# Patient Record
Sex: FEMALE | Race: WHITE | NOT HISPANIC OR LATINO | Employment: OTHER | URBAN - METROPOLITAN AREA
[De-identification: names, ages, dates, MRNs, and addresses within clinical notes are randomized per-mention and may not be internally consistent; named-entity substitution may affect disease eponyms.]

---

## 2017-01-12 ENCOUNTER — OFFICE VISIT - RIVER FALLS (OUTPATIENT)
Dept: FAMILY MEDICINE | Facility: CLINIC | Age: 67
End: 2017-01-12

## 2017-10-13 ENCOUNTER — AMBULATORY - RIVER FALLS (OUTPATIENT)
Dept: FAMILY MEDICINE | Facility: CLINIC | Age: 67
End: 2017-10-13

## 2017-10-14 LAB
CHOLEST SERPL-MCNC: 216 MG/DL
CHOLEST/HDLC SERPL: 3.3 {RATIO}
CREAT SERPL-MCNC: 0.71 MG/DL (ref 0.5–0.99)
GLUCOSE BLD-MCNC: 86 MG/DL (ref 65–99)
HDLC SERPL-MCNC: 66 MG/DL
LDLC SERPL CALC-MCNC: 134 MG/DL
NONHDLC SERPL-MCNC: 150 MG/DL
TRIGL SERPL-MCNC: 68 MG/DL

## 2017-11-09 ENCOUNTER — OFFICE VISIT - RIVER FALLS (OUTPATIENT)
Dept: FAMILY MEDICINE | Facility: CLINIC | Age: 67
End: 2017-11-09

## 2017-11-09 ASSESSMENT — MIFFLIN-ST. JEOR: SCORE: 1191.26

## 2017-12-08 ENCOUNTER — OFFICE VISIT - RIVER FALLS (OUTPATIENT)
Dept: FAMILY MEDICINE | Facility: CLINIC | Age: 67
End: 2017-12-08

## 2017-12-08 ASSESSMENT — MIFFLIN-ST. JEOR: SCORE: 1188.54

## 2017-12-18 ENCOUNTER — OFFICE VISIT - RIVER FALLS (OUTPATIENT)
Dept: FAMILY MEDICINE | Facility: CLINIC | Age: 67
End: 2017-12-18

## 2018-11-28 ENCOUNTER — OFFICE VISIT - RIVER FALLS (OUTPATIENT)
Dept: FAMILY MEDICINE | Facility: CLINIC | Age: 68
End: 2018-11-28

## 2018-12-03 ENCOUNTER — AMBULATORY - RIVER FALLS (OUTPATIENT)
Dept: FAMILY MEDICINE | Facility: CLINIC | Age: 68
End: 2018-12-03

## 2018-12-04 LAB
CHOLEST SERPL-MCNC: 240 MG/DL
CHOLEST/HDLC SERPL: 3.5 {RATIO}
CREAT SERPL-MCNC: 0.74 MG/DL (ref 0.6–0.93)
GLUCOSE BLD-MCNC: 90 MG/DL (ref 65–99)
HDLC SERPL-MCNC: 69 MG/DL
LDLC SERPL CALC-MCNC: 153 MG/DL
NONHDLC SERPL-MCNC: 171 MG/DL
TRIGL SERPL-MCNC: 78 MG/DL

## 2019-01-09 ENCOUNTER — OFFICE VISIT - RIVER FALLS (OUTPATIENT)
Dept: FAMILY MEDICINE | Facility: CLINIC | Age: 69
End: 2019-01-09

## 2019-01-09 ASSESSMENT — MIFFLIN-ST. JEOR: SCORE: 1179.47

## 2019-01-28 ENCOUNTER — OFFICE VISIT - RIVER FALLS (OUTPATIENT)
Dept: FAMILY MEDICINE | Facility: CLINIC | Age: 69
End: 2019-01-28

## 2019-09-27 ENCOUNTER — OFFICE VISIT - RIVER FALLS (OUTPATIENT)
Dept: FAMILY MEDICINE | Facility: CLINIC | Age: 69
End: 2019-09-27

## 2019-09-27 ASSESSMENT — MIFFLIN-ST. JEOR: SCORE: 1145.9

## 2019-10-31 ENCOUNTER — AMBULATORY - RIVER FALLS (OUTPATIENT)
Dept: FAMILY MEDICINE | Facility: CLINIC | Age: 69
End: 2019-10-31

## 2019-11-01 ENCOUNTER — COMMUNICATION - RIVER FALLS (OUTPATIENT)
Dept: FAMILY MEDICINE | Facility: CLINIC | Age: 69
End: 2019-11-01

## 2019-11-01 LAB
A/G RATIO - HISTORICAL: 1.9 (ref 1–2.5)
ALBUMIN SERPL-MCNC: 4.3 GM/DL (ref 3.6–5.1)
ALP SERPL-CCNC: 68 UNIT/L (ref 33–130)
ALT SERPL W P-5'-P-CCNC: 16 UNIT/L (ref 6–29)
AST SERPL W P-5'-P-CCNC: 20 UNIT/L (ref 10–35)
BILIRUB SERPL-MCNC: 0.4 MG/DL (ref 0.2–1.2)
BUN SERPL-MCNC: 5 MG/DL (ref 7–25)
BUN/CREAT RATIO - HISTORICAL: 7 (ref 6–22)
CALCIUM SERPL-MCNC: 9.9 MG/DL (ref 8.6–10.4)
CHLORIDE BLD-SCNC: 106 MMOL/L (ref 98–110)
CHOLEST SERPL-MCNC: 180 MG/DL
CHOLEST/HDLC SERPL: 2.3 {RATIO}
CO2 SERPL-SCNC: 31 MMOL/L (ref 20–32)
CREAT SERPL-MCNC: 0.72 MG/DL (ref 0.6–0.93)
EGFRCR SERPLBLD CKD-EPI 2021: 84 ML/MIN/1.73M2
ERYTHROCYTE [DISTWIDTH] IN BLOOD BY AUTOMATED COUNT: 12.2 % (ref 11–15)
GLOBULIN: 2.3 (ref 1.9–3.7)
GLUCOSE BLD-MCNC: 93 MG/DL (ref 65–99)
HCT VFR BLD AUTO: 40 % (ref 35–45)
HDLC SERPL-MCNC: 80 MG/DL
HGB BLD-MCNC: 13.5 GM/DL (ref 11.7–15.5)
LDLC SERPL CALC-MCNC: 84 MG/DL
MCH RBC QN AUTO: 30.8 PG (ref 27–33)
MCHC RBC AUTO-ENTMCNC: 33.8 GM/DL (ref 32–36)
MCV RBC AUTO: 91.3 FL (ref 80–100)
NONHDLC SERPL-MCNC: 100 MG/DL
PLATELET # BLD AUTO: 323 10*3/UL (ref 140–400)
PMV BLD: 10.4 FL (ref 7.5–12.5)
POTASSIUM BLD-SCNC: 4.7 MMOL/L (ref 3.5–5.3)
PROT SERPL-MCNC: 6.6 GM/DL (ref 6.1–8.1)
RBC # BLD AUTO: 4.38 10*6/UL (ref 3.8–5.1)
SODIUM SERPL-SCNC: 141 MMOL/L (ref 135–146)
TRIGL SERPL-MCNC: 70 MG/DL
WBC # BLD AUTO: 5.4 10*3/UL (ref 3.8–10.8)

## 2020-02-26 ENCOUNTER — OFFICE VISIT - RIVER FALLS (OUTPATIENT)
Dept: FAMILY MEDICINE | Facility: CLINIC | Age: 70
End: 2020-02-26

## 2020-02-26 ASSESSMENT — MIFFLIN-ST. JEOR: SCORE: 1174.93

## 2020-12-11 ENCOUNTER — OFFICE VISIT - RIVER FALLS (OUTPATIENT)
Dept: FAMILY MEDICINE | Facility: CLINIC | Age: 70
End: 2020-12-11

## 2021-03-01 ENCOUNTER — TRANSFERRED RECORDS (OUTPATIENT)
Dept: MULTI SPECIALTY CLINIC | Facility: CLINIC | Age: 71
End: 2021-03-01
Payer: MEDICARE

## 2021-03-01 ENCOUNTER — OFFICE VISIT - RIVER FALLS (OUTPATIENT)
Dept: FAMILY MEDICINE | Facility: CLINIC | Age: 71
End: 2021-03-01
Payer: MEDICARE

## 2021-03-01 ASSESSMENT — MIFFLIN-ST. JEOR: SCORE: 1174.93

## 2021-03-02 ENCOUNTER — COMMUNICATION - RIVER FALLS (OUTPATIENT)
Dept: FAMILY MEDICINE | Facility: CLINIC | Age: 71
End: 2021-03-02
Payer: MEDICARE

## 2021-03-02 LAB
A/G RATIO - HISTORICAL: 1.8 (ref 1–2.5)
ALBUMIN SERPL-MCNC: 4.2 GM/DL (ref 3.6–5.1)
ALP SERPL-CCNC: 64 UNIT/L (ref 37–153)
ALT SERPL W P-5'-P-CCNC: 19 UNIT/L (ref 6–29)
AST SERPL W P-5'-P-CCNC: 20 UNIT/L (ref 10–35)
BASOPHILS # BLD MANUAL: 50 10*3/UL (ref 0–200)
BASOPHILS NFR BLD MANUAL: 1.1 %
BILIRUB SERPL-MCNC: 0.4 MG/DL (ref 0.2–1.2)
BUN SERPL-MCNC: 5 MG/DL (ref 7–25)
BUN/CREAT RATIO - HISTORICAL: 7 (ref 6–22)
CALCIUM SERPL-MCNC: 9.5 MG/DL (ref 8.6–10.4)
CHLORIDE BLD-SCNC: 104 MMOL/L (ref 98–110)
CHOLEST SERPL-MCNC: 147 MG/DL
CHOLEST/HDLC SERPL: 2.2 {RATIO}
CO2 SERPL-SCNC: 29 MMOL/L (ref 20–32)
CREAT SERPL-MCNC: 0.72 MG/DL (ref 0.6–0.93)
EGFRCR SERPLBLD CKD-EPI 2021: 84 ML/MIN/1.73M2
EOSINOPHIL # BLD MANUAL: 248 10*3/UL (ref 15–500)
EOSINOPHIL NFR BLD MANUAL: 5.5 %
ERYTHROCYTE [DISTWIDTH] IN BLOOD BY AUTOMATED COUNT: 12.5 % (ref 11–15)
GLOBULIN: 2.3 (ref 1.9–3.7)
GLUCOSE BLD-MCNC: 86 MG/DL (ref 65–99)
HCT VFR BLD AUTO: 38.2 % (ref 35–45)
HDLC SERPL-MCNC: 68 MG/DL
HGB BLD-MCNC: 12.7 GM/DL (ref 11.7–15.5)
LDLC SERPL CALC-MCNC: 58 MG/DL
LYMPHOCYTES # BLD MANUAL: 1517 10*3/UL (ref 850–3900)
LYMPHOCYTES NFR BLD MANUAL: 33.7 %
MCH RBC QN AUTO: 30.5 PG (ref 27–33)
MCHC RBC AUTO-ENTMCNC: 33.2 GM/DL (ref 32–36)
MCV RBC AUTO: 91.8 FL (ref 80–100)
MONOCYTES # BLD MANUAL: 486 10*3/UL (ref 200–950)
MONOCYTES NFR BLD MANUAL: 10.8 %
NEUTROPHILS # BLD MANUAL: 2201 10*3/UL (ref 1500–7800)
NEUTROPHILS NFR BLD MANUAL: 48.9 %
NONHDLC SERPL-MCNC: 79 MG/DL
PLATELET # BLD AUTO: 323 10*3/UL (ref 140–400)
PMV BLD: 10.6 FL (ref 7.5–12.5)
POTASSIUM BLD-SCNC: 4 MMOL/L (ref 3.5–5.3)
PROT SERPL-MCNC: 6.5 GM/DL (ref 6.1–8.1)
RBC # BLD AUTO: 4.16 10*6/UL (ref 3.8–5.1)
SODIUM SERPL-SCNC: 141 MMOL/L (ref 135–146)
TRIGL SERPL-MCNC: 131 MG/DL
WBC # BLD AUTO: 4.5 10*3/UL (ref 3.8–10.8)

## 2021-10-18 ENCOUNTER — OFFICE VISIT - RIVER FALLS (OUTPATIENT)
Dept: FAMILY MEDICINE | Facility: CLINIC | Age: 71
End: 2021-10-18
Payer: MEDICARE

## 2021-10-18 ENCOUNTER — LAB REQUISITION (OUTPATIENT)
Dept: LAB | Facility: CLINIC | Age: 71
End: 2021-10-18
Payer: COMMERCIAL

## 2021-10-18 DIAGNOSIS — U07.1 COVID-19: ICD-10-CM

## 2021-10-18 PROCEDURE — U0003 INFECTIOUS AGENT DETECTION BY NUCLEIC ACID (DNA OR RNA); SEVERE ACUTE RESPIRATORY SYNDROME CORONAVIRUS 2 (SARS-COV-2) (CORONAVIRUS DISEASE [COVID-19]), AMPLIFIED PROBE TECHNIQUE, MAKING USE OF HIGH THROUGHPUT TECHNOLOGIES AS DESCRIBED BY CMS-2020-01-R: HCPCS | Mod: ORL | Performed by: PHYSICIAN ASSISTANT

## 2021-10-18 ASSESSMENT — MIFFLIN-ST. JEOR: SCORE: 1129.57

## 2021-10-20 LAB — SARS-COV-2 RNA RESP QL NAA+PROBE: NOT DETECTED

## 2021-10-21 LAB — SARS-COV-2 RNA RESP QL NAA+PROBE: NEGATIVE

## 2022-02-12 VITALS
HEIGHT: 62 IN | DIASTOLIC BLOOD PRESSURE: 80 MMHG | HEART RATE: 82 BPM | TEMPERATURE: 97.5 F | SYSTOLIC BLOOD PRESSURE: 130 MMHG | BODY MASS INDEX: 29.74 KG/M2 | WEIGHT: 161.6 LBS

## 2022-02-12 VITALS
SYSTOLIC BLOOD PRESSURE: 118 MMHG | DIASTOLIC BLOOD PRESSURE: 66 MMHG | DIASTOLIC BLOOD PRESSURE: 76 MMHG | SYSTOLIC BLOOD PRESSURE: 128 MMHG | WEIGHT: 161 LBS | HEART RATE: 96 BPM | HEART RATE: 68 BPM | OXYGEN SATURATION: 97 % | TEMPERATURE: 98.1 F | HEIGHT: 62 IN | BODY MASS INDEX: 29.63 KG/M2

## 2022-02-12 VITALS
HEART RATE: 66 BPM | DIASTOLIC BLOOD PRESSURE: 82 MMHG | TEMPERATURE: 97 F | SYSTOLIC BLOOD PRESSURE: 132 MMHG | WEIGHT: 148 LBS | BODY MASS INDEX: 27.23 KG/M2 | HEIGHT: 62 IN

## 2022-02-12 VITALS
DIASTOLIC BLOOD PRESSURE: 60 MMHG | SYSTOLIC BLOOD PRESSURE: 146 MMHG | WEIGHT: 158 LBS | BODY MASS INDEX: 29.08 KG/M2 | HEART RATE: 64 BPM | HEIGHT: 62 IN | OXYGEN SATURATION: 97 %

## 2022-02-12 VITALS
DIASTOLIC BLOOD PRESSURE: 84 MMHG | HEART RATE: 73 BPM | OXYGEN SATURATION: 99 % | WEIGHT: 158 LBS | TEMPERATURE: 97.9 F | BODY MASS INDEX: 28.9 KG/M2 | SYSTOLIC BLOOD PRESSURE: 142 MMHG

## 2022-02-12 VITALS
HEART RATE: 77 BPM | TEMPERATURE: 96.8 F | WEIGHT: 158 LBS | DIASTOLIC BLOOD PRESSURE: 72 MMHG | SYSTOLIC BLOOD PRESSURE: 154 MMHG | OXYGEN SATURATION: 97 % | BODY MASS INDEX: 29.08 KG/M2 | HEIGHT: 62 IN

## 2022-02-12 VITALS
HEIGHT: 62 IN | SYSTOLIC BLOOD PRESSURE: 100 MMHG | WEIGHT: 151.6 LBS | BODY MASS INDEX: 27.9 KG/M2 | DIASTOLIC BLOOD PRESSURE: 60 MMHG | TEMPERATURE: 96.1 F | OXYGEN SATURATION: 96 % | HEART RATE: 68 BPM

## 2022-02-12 VITALS
SYSTOLIC BLOOD PRESSURE: 134 MMHG | DIASTOLIC BLOOD PRESSURE: 82 MMHG | OXYGEN SATURATION: 97 % | HEART RATE: 90 BPM | HEIGHT: 62 IN | TEMPERATURE: 97.9 F | BODY MASS INDEX: 28.13 KG/M2

## 2022-02-12 VITALS
HEIGHT: 62 IN | WEIGHT: 159 LBS | SYSTOLIC BLOOD PRESSURE: 132 MMHG | HEART RATE: 66 BPM | TEMPERATURE: 96.9 F | BODY MASS INDEX: 29.26 KG/M2 | DIASTOLIC BLOOD PRESSURE: 80 MMHG

## 2022-02-16 NOTE — TELEPHONE ENCOUNTER
---------------------  From: Gardenia Gonzáles CMA (Phone Messages Pool ([id number]))   To: [facility] Message Pool ([id number]);     Sent: 1/11/2021 9:52:38 AM CST  Subject: phone note     Phone Message    PCP:    CHT      Time of Call:  9:43 am       Person Calling:  Mone  Phone number:  [phone] O.K. to leave detailed message    Returned call at: 9:49 am    Note:  Patient called asking for medication refill on cream for her hands. States that one hand is almost completely cleared. Also she wanted to let [facility] know she was seen in ER Saturday night and was diagnosed with Cellulitis in her Right hand, was prescribed and antibiotic and the swelling has come down some. Called and notified patient that [facility] is out of clinic today and I will forward the message on for tomorrow. Patient O.K. to wait.     triamcinolone topical; last filled: 1/10/2021 #30gm, 1 refill  Please notify patient that rx was sent on 1/10/2021.     [facility]    Last office visit and reason:  12/11/2020 skin rash    Transferred to: [facility] message pool---------------------  From: Gardenia Bergeron LPN ([facility] Message Pool (32224[facility]))   To: Skip Theodore MD;     Sent: 1/12/2021 8:01:51 AM CST  Subject: FW: phone note---------------------  From: Skip Theodore MD   To: [facility] Message Pool ([address]224_[facility]);     Sent: 1/12/2021 9:29:57 AM CST  Subject: RE: phone note     Called and advised patient to use triamcinolone as needed.

## 2022-02-16 NOTE — NURSING NOTE
Quick Intake Entered On:  3/1/2021 1:11 PM CST    Performed On:  3/1/2021 1:11 PM CST by Alexus Conti MD               Summary   Advance Directive :   No   Menstrual Status :   Hysterectomy   Height Measured :   62 in(Converted to: 5 ft 2 in, 157.48 cm)    Height/Length Estimated :   62 in(Converted to: 5 ft 2 in, 157.48 cm)    Systolic Blood Pressure :   154 mmHg (HI)    Diastolic Blood Pressure :   72 mmHg   Mean Arterial Pressure :   99 mmHg   BP Site :   Right arm   BP Method :   Manual   Alexus Conti MD - 3/1/2021 1:11 PM CST

## 2022-02-16 NOTE — TELEPHONE ENCOUNTER
Entered by Macho Jama PA-C on November 05, 2021 12:31:01 PM CDT  ---------------------  From: Macho Jama PA-C   To: [email or web address] #[id number]    Sent: 11/5/2021 12:31:01 PM CDT  Subject: Medication Management     ** Submitted: **  Complete:calcitonin (calcitonin 200 intl units/inh nasal spray)   Signed by Macho Jama PA-C  11/5/2021 5:31:00 PM [facility]    ** Approved with modifications: **  calcitonin (CALCITONIN 200IU/NASAL SPR 30 DOSES)  INSTILL 1 SPRAY(S) NASAL DAILY  Qty:  11.1 mL        Days Supply:  90        Refills:  0          Substitutions Allowed     Route To Pharmacy - [email or web address] #[id number]   Note from Pharmacy:  **Patient requests 90 days supply**  Signed by Macho Jama PA-C            ------------------------------------------  From: [facility] #[id number]  To: Skip Theodore MD  Sent: November 4, 2021 1:21:00 PM CDT  Subject: Medication Management  Due: October 21, 2021 8:18:07 PM CDT     ** On Hold Pending Signature **     Dispensed Drug: calcitonin (calcitonin 200 intl units/inh nasal spray), INSTILL 1 SPRAY(S) NASAL DAILY  Quantity: 11.1 mL  Days Supply: 90  Refills: 0  Substitutions Allowed  Notes from Pharmacy: **Patient requests 90 days supply**  ------------------------------------------

## 2022-02-16 NOTE — LETTER
(Inserted Image. Unable to display)   [address] [facility] [address]  March 11, 2021        IRENE BURGESS   [place], [place] [id number]      Dear IRENE,      Thank you for selecting [facility] for your [facility] needs.      This letter is to inform you that we have received a copy of your mammogram results.  Your results were normal.  You will also receive notice of your results from the radiologist within 7-10 days.  This letter is to let you know I have also received a copy and it is filed in your clinic chart.      Please plan on having your next mammogram done in 12 months.       Please contact me or my assistant at [phone] if you have any questions or concerns.     Sincerely,      Alexus Conti MD

## 2022-02-16 NOTE — LETTER
(Inserted Image. Unable to display)   October 01, 2019      IRENE BURGESS   [place], [place] [id number]        Dear IRENE,      Thank you for selecting [facility] (previously [facility]) for your healthcare needs.     Our records indicate you are due for the following services:     Fasting Lab Tests ~ Please do not eat or drink anything 10 hours prior to your scheduled appointment time.                (Water and any medications that you may need are allowed unless directed otherwise.)    If you had your labs done at another facility or with [facility] at [facility], please bring in a copy of the results to your next visit, mail a copy, or drop off a copy of your results to your Healthcare Provider.    To schedule an appointment or if you have further questions, please contact your primary clinic:   [facility]          [phone]   [facility]    [phone]             [facility]         [phone]      Powered by [employer]    Sincerely,    Alexus Conti M.D.

## 2022-02-16 NOTE — TELEPHONE ENCOUNTER
Entered by Virginia Simpson CMA on December 14, 2020 7:54:53 AM CST  ---------------------  From: Virginia Simpson CMA   To: [place] [address] [facility]    Sent: 12/14/2020 7:54:53 AM CST  Subject: Medication Management     ** Not Approved: Patient has requested refill too soon, #1, 2 sent 12/11/20 **  calcitonin (CALCITONIN-SALMON 200 UNITS SP)  SPRAY 1 SPRAY INTO EACH NOSTRIL EVERY DAY  Qty:  3.7 spray(s)        Days Supply:  30        Refills:  12          Substitutions Allowed     Route To Pharmacy - [place] [address] [facility]   Signed by Virginia Simpson CMA            ------------------------------------------  From: [place] [address] [facility]  To: Alexus Conti MD  Sent: December 13, 2020 8:30:34 AM CST  Subject: Medication Management  Due: December 8, 2020 10:07:06 AM CST     ** On Hold Pending Signature **     Dispensed Drug: calcitonin (calcitonin 200 intl units/inh nasal spray), SPRAY 1 SPRAY INTO EACH NOSTRIL EVERY DAY  Quantity: 3.7 spray(s)  Days Supply: 30  Refills: 12  Substitutions Allowed  Notes from Pharmacy:  ------------------------------------------

## 2022-02-16 NOTE — PROGRESS NOTES
Patient:   IRENE BURGESS            MRN: [id number]            FIN: [id number]               Age:   71 years     Sex:  Female     :  1950   Associated Diagnoses:   Maxillary sinusitis; Contact with or exposure to viral disease   Author:   Macho Jama PA-C      Visit Information      Date of Service: 10/18/2021 02:27 pm  Performing Location: [facility]  Encounter#: [id number]      Primary Care Provider (PCP):  Skip Theodore MD    NPI# [id number]      Referring Provider:  Macho Jama PA-C    NPI# [id number]   Visit type:  New symptom.    Source of history:  Self.    Referral source:  Self.    History limitation:  None.       Chief Complaint   10/18/2021 2:28 PM CDT   c/o chills, body aches,post nasal drip,sinus pressure since Saturday night        History of Present Illness             The patient presents with sinus problem.  The sinus problem is located in the maxillary sinus.  The sinus problem is characterized by nasal congestion, rhinorrhea and facial pain.  The severity of the sinus problem is moderate.  The sinus problem is episodic, fluctuates in intensity and is worsening.  The sinus problem has lasted for 7 day(s).  No known C-19 exposure but does watch grandchildren every week..  Associated symptoms consist of cough.  CC above noted and confirmed with the patient..        Review of Systems   Constitutional:  Negative except as documented in history of present illness.    Eye:  Negative.    Ear/Nose/Mouth/Throat:  Negative except as documented in history of present illness.    Respiratory:  Negative except as documented in history of present illness.       Health Status   Allergies:    Allergic Reactions (All)  Severity Not Documented  Lipitor (Myalgias)  Lisinopril (Cough)   Medications:  (Selected)   Prescriptions  Prescribed  amoxicillin-clavulanate 875 mg-125 mg oral tablet: = 1 tab(s), Oral, q12 hrs, x 10 day(s), # 20 tab(s), 0 Refill(s), Type: Acute, Pharmacy:  [facility] #[id number], 1 tab(s) Oral q12 hrs,x10 day(s), 62, in, 10/18/21 14:28:00 CDT, Height Measured, 148, lb, 10/18/21 14:28:00 CDT, Weight Measured  calcitonin 200 intl units/inh nasal spray: = 1 spray(s), Nasal, daily, # 3 EA, 3 Refill(s), Type: Maintenance, Pharmacy: [facility] #[id number], Patient will start in 2021, 1 spray(s) Nasal daily, 62, in, 03/01/21 12:17:00 CST, Height Measured, 158, lb, 03/01/21 12:17:00 CST, Weight Measured...  clopidogrel 75 mg oral tablet: = 1 tab(s), Oral, daily, # 30 tab(s), 0 Refill(s), Type: Maintenance, Pharmacy: [facility] #[id number], TAKE 1 TABLET BY MOUTH DAILY, 62, in, 03/01/21 13:11:00 CST, Height Measured, 158, lb, 03/01/21 12:17:00 CST, Weight Measured  rosuvastatin 10 mg oral tablet: = 1 tab(s), Oral, qhs, # 30 tab(s), 0 Refill(s), Type: Maintenance, Pharmacy: [facility] #[id number], TAKE 1 TABLET BY MOUTH AT BEDTIME, 62, in, 03/01/21 13:11:00 CST, Height Measured, 158, lb, 03/01/21 12:17:00 CST, Weight Measured  triamcinolone 0.1% topical ointment: 1 jorge a, Topical, tid, # 60 gm, 2 Refill(s), Type: Maintenance, Pharmacy: [facility] #[id number], 1 jorge a Topical tid, 62, in, 03/01/21 12:17:00 CST, Height Measured, 158, lb, 03/01/21 12:17:00 CST, Weight Measured  Documented Medications  Documented  Calcium 500+D: 1 tab(s), chewed, bid, 0 Refill(s), Type: Maintenance  Prednisol 1% ophthalmic solution: 1 drop(s), left eye, daily, PRN: for arthritis, mL, 0 Refill(s), Type: Maintenance  multivitamin with minerals (w/ Iron): po, bid, 0 Refill(s), Type: Maintenance,    Medications          *denotes recorded medication          amoxicillin-clavulanate 875 mg-125 mg oral tablet: 1 tab(s), Oral, q12 hrs, for 10 day(s), 20 tab(s), 0 Refill(s).          calcitonin 200 intl units/inh nasal spray: 1 spray(s), Nasal, daily, 3 EA, 3 Refill(s).          *Calcium 500+D: 1 tab(s), chewed, bid, 0 Refill(s).          clopidogrel 75 mg oral tablet: 1 tab(s), Oral,  daily, 30 tab(s), 0 Refill(s).          *multivitamin with minerals (w/ Iron): po, bid, 0 Refill(s).          *Prednisol 1% ophthalmic solution: 1 drop(s), left eye, daily, mL, PRN: for arthritis.          rosuvastatin 10 mg oral tablet: 1 tab(s), Oral, qhs, 30 tab(s), 0 Refill(s).          triamcinolone 0.1% topical ointment: 1 jorge a, Topical, tid, 60 gm, 2 Refill(s).       Problem list:    All Problems  Unspecified primary angle-closure glaucoma, stage unspecified / SNOMED CT 1681784648 / Confirmed  Osteopenia / SNOMED CT 120732325 / Confirmed  Familial Hypercholesteremia / SNOMED CT 375572845 / Confirmed  Colon cancer screening / SNOMED CT 403188826 / Confirmed  Allergic rhinitis, unspecified / SNOMED CT 900909103 / Confirmed  Abnormal immunological finding in serum, unspecified / SNOMED CT 1168046362 / Confirmed  Inactive: Age-related osteoporosis without current pathological fracture / SNOMED CT 115608198  Resolved: Tobacco use / SNOMED CT 3042068932  Resolved: Pregnancy / SNOMED CT 942148656  Resolved: Pregnancy / SNOMED CT 283771406      Histories   Past Medical History:    Active  Abnormal immunological finding in serum, unspecified (2373096648): Onset in  at 51 years.  Allergic rhinitis, unspecified (775743349)  Unspecified primary angle-closure glaucoma, stage unspecified (4256883106)  Familial Hypercholesteremia (143197110)  Osteopenia (799436720)  Resolved  Pregnancy (101636513): Onset on 1977 at 27 years.  Resolved in  at 27 years.  Pregnancy (328123174): Onset on 1973 at 23 years.  Resolved in  at 23 years.  Tobacco use (3020528588):  Resolved.   Family History:    Peptic ulcer disease  Father  Grandfather (P)  Grandmother (P)  Diabetes mellitus  Aunt  CA - Lung cancer  Mother ()  Heart disease  Grandfather (P)  Cerebrovascular accident  Father  CA - Cancer  Grandfather (P)  Migraine  Daughter (Henny)  Brother  Antiphospholipid syndrome  Sister  Stroke  Father  Kidney  disease  Mother ()  Grandmother (M)  Grandfather (M)  Tobacco abuse  Mother ()  Depression  Brother  Son (Jet)  Endometriosis  Sister  Obesity  Aunt  Daughter (Henny)     Procedure history:    Colonoscopy (972585060) on 2016 at 66 Years.  Comments:  2016 8:32 AM CDT - [place]  Indication:  Heme positive stool.  Sedation:  Midazolam 3 mg IV, Fentanyl 150 mcg IV.  Impression:  Internal hemorrhoids that prolapse with straining, but require manual replacement into the anal canal (Grade III) found on perianal exam.  Significant looping of the colon.  Normal colon.  Hemorrhoids.  Oophorectomy (486380387) on 1/3/2007 at 56 Years.  Resection (657513195) on 1/3/2007 at 56 Years.  Comments:  2010 1:09 PM [facility] Thibodeaux  of vaginal septum  Hysterectomy (159851121) in the month of 2007 at 56 Years.  Comments:  2010 1:05 PM Carmina Thibodeaux  laparoscopic-assisted vaginal  Hysteroscopy (442597933) in  at 56 Years.  Bunionectomy (71143069) on 2000 at 50 Years.  Osteotomy (622682014) on 2000 at 50 Years.  Tonsillectomy and adenoidectomy (800024012).  Appendectomy (551430396).   section (51813366).   section (98214222).  Dilation and curettage (17139583).   Social History:        Electronic Cigarette/Vaping Assessment            Electronic Cigarette Use: Never.      Alcohol Assessment: Current            Beer (12 oz), Wine (5 oz), 1-2 times per year, 2 drinks/episode average.                     Comments:                      2015 - Kimberly White LPN                     Infrequent alcohol use per pt.      Tobacco Assessment            Former smoker, quit more than 30 days ago      Substance Abuse Assessment: Past            Marijuana, In college      Employment and Education Assessment            Part time, Work/School description: Interior design.  Highest education level: 3 yrs college.      Home and Environment Assessment            Marital  status: .  Spouse/Partner name: Brandon.      Nutrition and Health Assessment            Type of diet: Plant based.  Wants to lose weight: Yes.  Sleeping concerns: Yes.  Feels highly stressed: No.      Exercise and Physical Activity Assessment: Regular exercise            Exercise frequency: Daily.  Exercise type: Walking, Dancing.      Sexual Assessment            Sexually active: Yes.  Sexual orientation: Straight or heterosexual.      Other Assessment            First menses age 12.  Menstrual duration 5 days.  Cycle interval 25-30 days.  No abnormal Pap smear.        Physical Examination   Vital Signs   10/18/2021 2:28 PM CDT Temperature Tympanic 97.0 DegF  LOW    Peripheral Pulse Rate 66 bpm    HR Method Electronic    Systolic Blood Pressure 132 mmHg  HI    Diastolic Blood Pressure 82 mmHg  HI    Mean Arterial Pressure 99 mmHg    BP Site Right arm    BP Method Electronic      Measurements from flowsheet : Measurements   10/18/2021 2:28 PM CDT Height Measured - Standard 62 in    Height/Length Estimated 62 in    Weight Measured - Standard 148 lb    BSA 1.71 m2    Body Mass Index 27.07 kg/m2  HI      General:  Alert and oriented, No acute distress.    Eye:  Pupils are equal, round and reactive to light, Extraocular movements are intact, Normal conjunctiva.    HENT:  Normocephalic, Tympanic membranes are clear, Oral mucosa is moist.         Nose: Both nostrils, Discharge ( Small amount, Green ).         Sinus: Bilateral, Ethmoid sinus, Tenderness.         Throat: Pharynx ( Erythematous ).    Neck:  Supple, Non-tender, No lymphadenopathy.    Respiratory:  Lungs are clear to auscultation, Respirations are non-labored.    Cardiovascular:  Normal rate, Regular rhythm, No murmur.       Impression and Plan   Diagnosis     Maxillary sinusitis (UQB80-HU J32.0).     Contact with or exposure to viral disease (HAD28-IU Z20.828).     Patient Instructions:       Counseled: Patient, Regarding diagnosis, Regarding  medications, Activity, Verbalized understanding.    Orders     Orders (Selected)   Outpatient Orders  Ordered  SARS-CoV-2 RNA (COVID-19), Qualitative NAAT (Request): Contact with or exposure to viral disease  Prescriptions  Prescribed  amoxicillin-clavulanate 875 mg-125 mg oral tablet: = 1 tab(s), Oral, q12 hrs, x 10 day(s), # 20 tab(s), 0 Refill(s), Type: Acute, Pharmacy: [facility] #[id number], 1 tab(s) Oral q12 hrs,x10 day(s), 62, in, 10/18/21 14:28:00 CDT, Height Measured, 148, lb, 10/18/21 14:28:00 CDT, Weight Measured.     Take medicine as prescribed, side effects discussed.  Tylenol/ibuprofen for fever and discomfort.  Push fluids.  RTC if not improving in 36-48 hours, prior if concerns as we have discussed.

## 2022-02-16 NOTE — TELEPHONE ENCOUNTER
Entered by Virginia Simpson CMA on January 11, 2021 9:12:20 AM CST  ---------------------  From: Virginia Simpson CMA   To: [facility] [address] [facility]    Sent: 1/11/2021 9:12:20 AM CST  Subject: Medication Management     ** Not Approved: Patient has requested refill too soon, #30, 1 sent 12/15/20 **  rosuvastatin (ROSUVASTATIN CALCIUM 10 MG TAB)  TAKE 1 TABLET BY MOUTH AT BEDTIME  Qty:  30 tab(s)        Days Supply:  30        Refills:  1          Substitutions Allowed     Route To Pharmacy - [facility] [id number] [facility]   Signed by Virginia Simpson CMA            ** Not Approved: Patient has requested refill too soon, #30, 1 sent 12/15/20 **  clopidogrel (CLOPIDOGREL 75 MG TABLET)  TAKE 1 TABLET BY MOUTH EVERY DAY  Qty:  30 tab(s)        Days Supply:  30        Refills:  1          Substitutions Allowed     Route To Pharmacy - [facility] [address] [facility]   Signed by Virginia Simpson CMA            ------------------------------------------  From: [facility] [address] [facility]  To: Alexus Conti MD  Sent: January 9, 2021 10:34:43 AM CST  Subject: Medication Management  Due: January 6, 2021 9:13:59 PM CST     ** On Hold Pending Signature **     Dispensed Drug: rosuvastatin (rosuvastatin 10 mg oral tablet), TAKE 1 TABLET BY MOUTH AT BEDTIME  Quantity: 30 tab(s)  Days Supply: 30  Refills: 1  Substitutions Allowed  Notes from Pharmacy:     ** On Hold Pending Signature **     Dispensed Drug: clopidogrel (clopidogrel 75 mg oral tablet), TAKE 1 TABLET BY MOUTH EVERY DAY  Quantity: 30 tab(s)  Days Supply: 30  Refills: 1  Substitutions Allowed  Notes from Pharmacy:  ------------------------------------------

## 2022-02-16 NOTE — PROGRESS NOTES
Patient:   IRENE BURGESS            MRN: [id number]            FIN: [id number]               Age:   68 years     Sex:  Female     :  1950   Associated Diagnoses:   Sprain of right shoulder; Strain of right trapezius muscle; Medicare annual wellness visit, subsequent; Familial Hypercholesteremia; Anticardiolipin Antibody Positive   Author:   Alexus Conti MD      Visit Information      Primary Care Provider (PCP):  Skip Theodore MD    NPI# [id number]      Chief Complaint   2019 9:53 AM CST     AWV; review labs   Here for annual wellness physical for Medicare      History of Present Illness     Current medical providers reviewed with patient updated on demographics in chart as listed on the patient health history form.  Patient notes that she is generally in good health.   Does have increased right upper back and right shoulder pain, seems mechanical, worsens at work.  Also discussed cholesterol, elevated levels, had previously been intolerant to lipitor  Depression screening completed and history reviewed with patient, no concerns.  CAGE reviewed with patient, no concerns.  Functional ability/Health Risk assessment reviewed:   Hearing impairment - denies concerns   Activities of daily living - independent without concerns   Fall risks - denies falls in past year, no assistance required with walking or transfer   Home safety issues reviewed, no concerns raised.  Cognitive impairment evaluated, patient oriented to year, date, location, self.  No deficits notes.  Cognitive screening and dementia symptoms reviewed.  Advanced care planning discussed.  Patient will need to complete - given paperwork.  Preventive services reviewed and documented on preventive services checklist.         Review of Systems   ROS reviewed as documented in chart      Health Status   Allergies:    Allergic Reactions (Selected)  Severity Not Documented  Lipitor (Myalgias)  Lisinopril (Cough)   Medications:  (Selected)    Prescriptions  Prescribed  calcitonin 200 intl units/inh nasal spray: = 1 spray(s), Nasal, daily, Instructions: alternate nostrils, # 1 EA, 3 Refill(s), Type: Maintenance, Pharmacy: [facility] [id number] [facility]  clopidogrel 75 mg oral tablet: = 1 tab(s) ( 75 mg ), Oral, daily, # 30 tab(s), 0 Refill(s), Type: Maintenance, Pharmacy: [facility] [id number] [facility], Due for Appointment for further refills., 1 tab(s) Oral daily  Documented Medications  Documented  Calcium 500+D: 1 tab(s), chewed, bid, 0 Refill(s), Type: Maintenance  Prednisol 1% ophthalmic solution: 1 drop(s), left eye, daily, PRN: for arthritis, mL, 0 Refill(s), Type: Maintenance  multivitamin with minerals (w/ Iron): po, bid, 0 Refill(s), Type: Maintenance   Problem list:    All Problems  Colon cancer screening / SNOMED CT 949591221 / Confirmed  Familial Hypercholesteremia / SNOMED CT 770630121 / Confirmed  Osteoporosis / ICD-9-.00 / Confirmed  Glaucoma, 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  Osteoporosis (ICD-9-.00)  Allergic Rhinitis (ICD-9-.9)  Glaucoma, Narrow-Angle (ICD-9-.20)  Resolved  Tobacco abuse (ICD-9-.1):  Resolved.   Family History:    Peptic ulcer disease  Father  Grandfather (P)  Grandmother (P)  CA - Lung cancer  Mother ()  Cerebrovascular accident  Father  Antiphospholipid syndrome  Sister  Stroke  Father  Kidney disease  Mother ()  Grandmother (M)  Grandfather (M)  Tobacco abuse  Mother ()  Depression  Brother  Son  Brother  Endometriosis  Sister     Procedure history:    Colonoscopy (106251169) on 2016 at 66 Years.  Comments:  2016 8:32 AM [facility] [place]  Indication:  Heme positive  stool.  Sedation:  Midazolam 3 mg IV, Fentanyl 150 mcg IV.  Impression:  Internal hemorrhoids that prolapse with straining, but require manual replacement into the anal canal (Grade III) found on perianal exam.  Significant looping of the colon.  Normal colon.  Hemorrhoids.  Oophorectomy (248674152) on 1/3/2007 at 56 Years.  Resection ([id number]) on 1/3/2007 at 56 Years.  Comments:  2010 1:09 PM Carmina Thibodeaux  of vaginal septum  Hysterectomy (048364850) in the month of 2007 at 56 Years.  Comments:  2010 1:05 PM Carmina Thibodeaux  laparoscopic-assisted vaginal  Hysteroscopy (443140041) in  at 56 Years.  Bunionectomy (81023281) on 2000 at 50 Years.  Osteotomy (924600575) on 2000 at 50 Years.  Tonsillectomy and adenoidectomy (771849338).  Appendectomy (481792519).   section ().   section ().  Dilation and curettage (85624793).  Childbirth (6817662224).   Social History:        Alcohol Assessment: Current            0 drinks/episode average.  1 drinks/episode maximum.                     Comments:                      2015 - Kimberly White LPN                     Infrequent alcohol use per pt.      Tobacco Assessment: Past            Past                     Comments:                      2010 - Carmina Spears                     Quit in       Employment and Education Assessment            Employed, Work/School description: .      Exercise and Physical Activity Assessment: Regular exercise            Exercise frequency: 5-6 times/week.  Exercise type: Walking.      Other Assessment            Marital Status,       Physical Examination   Vital Signs   2019 9:53 AM CST Temperature Tympanic 96.9 DegF  LOW    Peripheral Pulse Rate 66 bpm    Pulse Site Radial artery    HR Method Manual    Systolic Blood Pressure 132 mmHg  HI    Diastolic Blood Pressure 80 mmHg    Mean Arterial Pressure 97 mmHg    BP Site Right arm     BP Method Manual      Measurements from flowsheet : Measurements   1/9/2019 9:53 AM CST Height Measured - Standard 62 in    Weight Measured - Standard 159 lb    BSA 1.77 m2    Body Mass Index 29.08 kg/m2  HI      General:  Alert and oriented, No acute distress.    Eye:  Pupils are equal, round and reactive to light, Extraocular movements are intact, Normal conjunctiva.    HENT:  Normocephalic, Tympanic membranes are clear, Oral mucosa is moist, No pharyngeal erythema.    Neck:  Supple, Non-tender, No lymphadenopathy, No thyromegaly.    Respiratory:  Lungs are clear to auscultation.    Cardiovascular:  Normal rate, Regular rhythm.    Breast:  No mass, No tenderness, No discharge.    Gastrointestinal:  Soft, Non-tender, Non-distended, No organomegaly.    Musculoskeletal:  Normal range of motion, Normal strength.    Integumentary:  Warm, Dry, Pink, No rash, no concerning lesions.    Neurologic:  Alert, Oriented, Normal sensory.    Psychiatric:  Cooperative, Appropriate mood & affect.       Review / Management   Results review:  Lab results   12/3/2018 8:54 AM CST Sodium Level 140 mmol/L    Potassium Level 4.8 mmol/L    Chloride Level 103 mmol/L    CO2 Level 29 mmol/L    Glucose Level 90 mg/dL    BUN 9 mg/dL    Creatinine 0.74 mg/dL    BUN/Creat Ratio NOT APPLICABLE    eGFR 82 mL/min/1.73m2    eGFR African American 95 mL/min/1.73m2    Calcium Level 9.4 mg/dL    Bili Total 0.5 mg/dL    Alk Phos 61 unit/L    AST/SGOT 17 unit/L    ALT/SGPT 18 unit/L    Protein Total 6.3 gm/dL    Albumin Level 4.0 gm/dL    Globulin 2.3    A/G Ratio 1.7    Cholesterol 240 mg/dL  HI    Non-  HI    HDL 69 mg/dL    Chol/HDL Ratio 3.5      HI    Triglyceride 78 mg/dL    WBC 6.0    RBC 4.26    Hgb 12.8 gm/dL    Hct 37.4 %    MCV 87.8 fL    MCH 30.0 pg    MCHC 34.2 gm/dL    RDW 12.5 %    Platelet 374    MPV 10.3 fL   .       Impression and Plan   Diagnosis     Medicare annual wellness visit, subsequent (LBX88-FN Z00.00).     Course:   Progressing as expected.    Patient Instructions:       Counseled: Patient, Regarding diagnosis, Regarding treatment, Regarding medications, Diet, Activity, Prognosis/ end-of-life issues, BMI, exercise, healthy eating, home safety, depression.    Summary:  Preventive services recommended as noted on preventive services checklist..    Diagnosis     Sprain of right shoulder (KVJ76-OA S43.401A).     Strain of right trapezius muscle (SHI19-MF S46.811A).     Orders     Orders (Selected)   Outpatient Orders  Ordered  Referral (Request): 01/09/19 10:24:00 CST, Referred to: Physical Therapy, Sprain of right shoulder  Strain of right trapezius muscle  Referral (Request): 01/09/19 10:42:00 CST, Referred to: Physical Therapy, Reason for referral: needs new orthotic inserts, Bilateral foot pain.     Diagnosis     Familial Hypercholesteremia (KDP60-LS E78.01).     Anticardiolipin Antibody Positive (MUX25-GE R76.8).     Orders     Orders   Requests (Return to Office):  Return to Clinic (Request) (Order): RFV: fasting lipid, Return in 6 months.     Orders (Selected)   Prescriptions  Prescribed  Crestor 10 mg oral tablet: = 1 tab(s) ( 10 mg ), Oral, hs, # 30 tab(s), 11 Refill(s), Type: Maintenance, [facility], 1 tab(s) Oral hs  clopidogrel 75 mg oral tablet: = 1 tab(s) ( 75 mg ), Oral, daily, # 30 tab(s), 11 Refill(s), Type: Maintenance, Pharmacy:  [facility], 1 tab(s) Oral daily.

## 2022-02-16 NOTE — PROGRESS NOTES
Chief Complaint    cough and cold; x1 week; cough is getting worse; runny nose  History of Present Illness      patient with progressively worsening cough      started more than a week ago, now worse in cough      nose with thick drainage      no fevers         Physical Exam   Vitals & Measurements    T: 96.1   F (Tympanic)  HR: 68(Peripheral)  BP: 100/60  SpO2: 96%     HT: 62 in  WT: 151.6 lb  BMI: 27.72       patient is alert and oriented, cooperative, in no distress      eyes: PERRL, EOM intact, normal conjunctiva      heent: normocephalic, TMs normal, canals patent, no sinus tenderness, oral mucosa moist, no oral lesions, normal hypopharynx      neck: supple, no lymphadenopathy, no thyromegaly      CV: RRR, no murmur, no edema, normal peripheral perfusion      lungs: clear and equal bilaterally, scattered rhonchi and wheezes   Assessment/Plan       Atypical pneumonia (J18.9)         symptomatic treatment of respiratory infection discussed, follow up if any concerns         Ordered:          azithromycin, = 1 packet(s), Oral, once, Instructions: as directed on package labeling, # 6 tab(s), 0 Refill(s), Type: Soft Stop, Pharmacy: [facility], 1 packet(s) Oral once,Instr:as directed on package labeling, (Ordered)          benzonatate, = 1 cap(s) ( 100 mg ), Oral, tid, x 7 day(s), PRN: as needed for cough, # 20 cap(s), 0 Refill(s), Type: Acute, Pharmacy: [facility], 1 cap(s) Oral tid,x7 day(s),PRN:as needed for cough, (Ordered)                Orders:  Patient Information     Name:IRENE BURGESS      Address:      [address] [address]     Sex:Female     YOB: 1950     [phone]     Emergency Contact:YAMILETH BURGESS     MRN:[id number]     FIN:[id number]     Location:[facility]     Date of Service:09/27/2019      Primary Care Physician:       Skip Theodore MD, [phone]      Attending Physician:       Alexus Conti MD, [phone]  Problem List/Past Medical History    Ongoing     Allergic Rhinitis     Anticardiolipin Antibody Positive     Colon cancer screening     Familial Hypercholesteremia     Glaucoma, Narrow-Angle     Osteopenia     Osteoporosis    Historical     Tobacco abuse  Procedure/Surgical History     Colonoscopy (2016)            Comments: Indication:  Heme positive stool.      Sedation:  Midazolam 3 mg IV, Fentanyl 150 mcg IV.      Impression:  Internal hemorrhoids that prolapse with straining, but require manual replacement into the anal canal (Grade III) found on perianal exam.  Significant looping of the colon.  Normal colon.  Hemorrhoids..     Oophorectomy (2007)           Resection (2007)            Comments: of vaginal septum.     Hysterectomy ()            Comments: laparoscopic-assisted vaginal.     Hysteroscopy ()           Bunionectomy (2000)           Osteotomy (2000)           Appendectomy            section            section           Dilation and curettage           Tonsillectomy and adenoidectomy        Medications    calcitonin 200 intl units/inh nasal spray, 1 spray(s), Nasal, daily, 11 refills    Calcium 500+D, 1 tab(s), Chewed, bid    clopidogrel 75 mg oral tablet, 75 mg= 1 tab(s), Oral, daily, 11 refills    Crestor 10 mg oral tablet, 10 mg= 1 tab(s), Oral, hs, 11 refills    multivitamin with minerals (w/ Iron), Oral, bid    Prednisol 1% ophthalmic solution, 1 drop(s), Eye-Left, daily, PRN  Allergies    Lipitor (Myalgias)    lisinopril (Cough)  Social History    Smoking Status - 2019     Former smoker     Alcohol - Current, 2010      0 drinks/episode average. 1 drinks/episode maximum., 2015     Employment/School      Part time, Work/School description: Interior design., 2019     Exercise - Regular exercise, 2010      Exercise frequency: 1-2 times/week. Exercise type: Walking, Dancing., 2019      Home/Environment      Marital status: . Spouse/Partner name: Brandon., 01/16/2019     Other      First menses age 12. Menstrual duration 5 days. Cycle interval 25-30 days. No abnormal Pap smear., 01/16/2019     Sexual      Sexually active: Yes. Sexual orientation: Straight or heterosexual., 01/16/2019     Substance Abuse - Past, 01/16/2019      Marijuana, In college, 01/16/2019     Tobacco - Past, 11/11/2010      3 packs per day x 10 years., 01/16/2019      Past, 11/11/2010  Family History    Antiphospholipid syndrome: Sister.    CA - Lung cancer: Mother.    Cerebrovascular accident: Father.    Depression: Brother, Brother and Son.    Endometriosis: Sister.    Kidney disease: Mother, Grandfather (M) and Grandmother (M).    Peptic ulcer disease: Father, Grandfather (P) and Grandmother (P).    Stroke: Father.    Tobacco abuse: Mother.  Immunizations      Vaccine Date Status Comments      influenza virus vaccine, inactivated 11/09/2017 Given      influenza virus vaccine, inactivated 11/03/2016 Given [11/3/2016] Pt consented      pneumococcal (PCV13) 12/22/2015 Given      influenza virus vaccine, inactivated 12/22/2015 Given      influenza virus vaccine, inactivated 12/15/2014 Given      ZOS, shingles 04/10/2014 Given      pneumococcal (PPSV23) 11/18/2013 Given      influenza virus vaccine, inactivated 11/18/2013 Given      influenza virus vaccine, inactivated 10/18/2012 Given      influenza 08/30/2011 Given      Td 06/24/2005 Recorded      Td 10/16/1998 Recorded      DTP (obsolete) 01/08/1981 Recorded      MMR (measles/mumps/rubella) 08/14/1980 Recorded      OPV 11/15/1979 Recorded      DTP (obsolete) 11/15/1979 Recorded      OPV 09/11/1979 Recorded      DTP (obsolete) 09/11/1979 Recorded      OPV 07/17/1979 Recorded      DTP (obsolete) 07/17/1979 Recorded

## 2022-02-16 NOTE — TELEPHONE ENCOUNTER
Entered by Radha Kraft CMA on December 15, 2020 10:49:28 PM CST  ---------------------  From: Radha Kraft CMA   [id number]    Sent: 12/15/2020 10:49:28 PM CST  Subject: Medication Management     ** Submitted: **  Order:rosuvastatin (rosuvastatin 10 mg oral tablet)  1 tab(s)  Oral  qhs  Qty:  30 tab(s)        Days Supply:  30        Refills:  1          Substitutions Allowed     [id number]    Signed by Radha Kraft CMA  12/16/2020 4:49:00 AM UT    ** Submitted: **  Complete:rosuvastatin (rosuvastatin 10 mg oral tablet)   Signed by Radha Kraft CMA  12/16/2020 4:49:00 AM UT    ** Not Approved:  **  rosuvastatin (ROSUVASTATIN CALCIUM 10 MG TAB)  TAKE 1 TABLET BY MOUTH EVERYDAY AT BEDTIME  Qty:  30 tab(s)        Days Supply:  30        Refills:  11          Substitutions Allowed     [id number]   Signed by Radha Kraft CMA            ** Submitted: **  Order:clopidogrel (clopidogrel 75 mg oral tablet)  1 tab(s)  Oral  daily  Qty:  30 tab(s)        Days Supply:  30        Refills:  1          Substitutions Allowed     [id number]    Signed by Radha Kraft CMA  12/16/2020 4:48:00 AM UT    ** Submitted: **  Complete:clopidogrel (clopidogrel 75 mg oral tablet)   Signed by Radha Kraft CMA  12/16/2020 4:48:00 AM UT    ** Not Approved:  **  clopidogrel (CLOPIDOGREL 75 MG TABLET)  TAKE 1 TABLET BY MOUTH EVERY DAY  Qty:  30 tab(s)        Days Supply:  30        Refills:  11          Substitutions Allowed     [id number]   Signed by Radha Kraft CMA            ------------------------------------------  From: [id number]  To: Alexus Conti MD  Sent: December 15, 2020 8:33:15 AM CST  Subject: Medication Management  Due: December 8, 2020 10:07:06 AM CST     ** On Hold Pending Signature **     Dispensed Drug: rosuvastatin (rosuvastatin 10 mg oral tablet), TAKE 1 TABLET BY MOUTH EVERYDAY  AT BEDTIME  Quantity: 30 tab(s)  Days Supply: 30  Refills: 11  Substitutions Allowed  Notes from Pharmacy:     ** On Hold Pending Signature **     Dispensed Drug: clopidogrel (clopidogrel 75 mg oral tablet), TAKE 1 TABLET BY MOUTH EVERY DAY  Quantity: 30 tab(s)  Days Supply: 30  Refills: 11  Substitutions Allowed  Notes from Pharmacy:  ------------------------------------------Pt is due for Chronic Disease visit and labs in Feb.  Refilled #30 with 1 refill per protocol.  RTC is placed.  Radha Kraft CMA.

## 2022-02-16 NOTE — TELEPHONE ENCOUNTER
Entered by Meredith Stanton CMA on January 21, 2020 11:59:51 AM CST  ---------------------  From: Meredith Stanton CMA   To: [facility] [id number] [facility]    Sent: 1/21/2020 11:59:51 AM CST  Subject: Medication Management     ** Submitted: **  Order:clopidogrel (clopidogrel 75 mg oral tablet)  1 tab(s)  Oral  daily  Qty:  30 tab(s)        Refills:  0          Substitutions Allowed     Route To Pharmacy - [facility] [address] [facility]    Signed by Meredith Stanton CMA  1/21/2020 11:58:00 AM    ** Submitted: **  Complete:clopidogrel (clopidogrel 75 mg oral tablet)   Signed by Meredith Stanton CMA  1/21/2020 11:59:00 AM    ** Not Approved:  **  clopidogrel (CLOPIDOGREL 75 MG TABLET)  TAKE 1 TABLET BY MOUTH EVERY DAY  Qty:  30 tab(s)        Days Supply:  30        Refills:  9          Substitutions Allowed     Route To Pharmacy - [facility] [address] [facility]   Signed by Meredith Stanton CMA            ** Patient matched by Meredith Stanton CMA on 1/21/2020 11:56:39 AM CST **      ------------------------------------------  From: [facility] [id number] [facility]  To: Alexus Conti MD  Sent: January 21, 2020 1:13:11 AM CST  Subject: Medication Management  Due: January 22, 2020 1:13:11 AM CST    ** On Hold Pending Signature **  Drug: clopidogrel (clopidogrel 75 mg oral tablet)  TAKE 1 TABLET BY MOUTH EVERY DAY  Quantity: 30 tab(s)  Days Supply: 30  Refills: 9  Substitutions Allowed  Notes from Pharmacy:     Dispensed Drug: clopidogrel (clopidogrel 75 mg oral tablet)  TAKE 1 TABLET BY MOUTH EVERY DAY  Quantity: 30 tab(s)  Days Supply: 30  Refills: 9  Substitutions Allowed  Notes from Pharmacy:   ------------------------------------------Date of last office visit and reason:  1/9/19 AWV      Date of last Med Check / Px:   1/9/19  Date of last labs pertaining to med:  11/1/19    RTC order in chart:  yes, due now    For Protocol refill, has patient been contacted: Refilled for 30 days 0 refills, unable to reach patient due to voicemail box being full.

## 2022-02-16 NOTE — NURSING NOTE
Med Refill    Date of last office visit and reason:  01/12/17; sinus      Date of last Med Check / Px:   04/2017  Date of last labs pertaining to med:  04/2017    RTC order in chart:  yes    For Protocol refill, has patient been contacted:  Called & left a detailed message stating that pt needs to schedule a med check & fasting labs for more refills. Did give a 30 day supply.

## 2022-02-16 NOTE — TELEPHONE ENCOUNTER
---------------------  From: Leia/Dena SANCHEZ (Phone Messages Pool ([id number]_[facility]))   [facility];     Sent: 2/28/2020 4:20:12 PM CST  Subject: Calcitonin      Phone Message    PCP: KORIN NG    Time of Call: 1600    Phone Number: [phone]    Note: Patient called stating that she saw HERNANDEZ on 2/26 and got all of her medications refilled. Patient stated that the only one that didn't get refilled was the Calcitonin Nasal Spray. Patient asking that this be refilled. Please Advise.     Patient is ok waiting until Monday to get this filled.     [facility]    Last office visit and reason: 2/26/20 AWV    Transferred to: EMILY sent this in for patient. Had received refill request.

## 2022-02-16 NOTE — PROGRESS NOTES
Patient:   IRENE BURGESS            MRN: [id number]            FIN: [id number]               Age:   67 years     Sex:  Female     :  1950   Associated Diagnoses:   Anticardiolipin Antibody Positive; Osteoporosis; Familial Hypercholesteremia   Author:   Skip Theodore MD      Subjective   Chief complaint 2017 10:21 AM CST   HLD med check, review labs   Noted as above. Discussed with Patient..  Additional information Has history of Anticardiolipin Antibody. Also f/u Osteopenia, needs Bone density next year..        Health Status   Allergies:    Allergic Reactions (Selected)  Severity Not Documented  Lipitor (Myalgias)  Lisinopril (Cough)   Medications:  (Selected)   Prescriptions  Prescribed  calcitonin 200 intl units/inh nasal spray: 1 spray(s), nasal, daily, # 3 EA, 3 Refill(s), Type: Maintenance, Pharmacy: [facility], 1 spray(s) nasal daily  clopidogrel 75 mg oral tablet: 1 tab(s) ( 75 mg ), po, daily, # 30 tab(s), 11 Refill(s), Type: Maintenance, Pharmacy: [id number], Needs to be seen for more refills., 1 tab(s) po daily  Documented Medications  Documented  Calcium 500+D: 1 tab(s), chewed, bid, 0 Refill(s), Type: Maintenance  Prednisol 1% ophthalmic solution: 1 drop(s), left eye, daily, PRN: for arthritis, mL, 0 Refill(s), Type: Maintenance  multivitamin with minerals (w/ Iron): po, bid, 0 Refill(s), Type: Maintenance   Problem list:    All Problems (Selected)  Allergic Rhinitis / ICD-9-.9 / Confirmed  Anticardiolipin Antibody Positive / ICD-9-.79 / Confirmed  Glaucoma, Narrow-Angle / ICD-9-.20 / Confirmed  Osteoporosis / ICD-9-.00 / Confirmed  Familial Hypercholesteremia / SNOMED CT 178060846 / Confirmed  Colon cancer screening / SNOMED CT 581980998 / Confirmed      Objective   Vital Signs   2017 10:21 AM CST Temperature Tympanic 97.5 DegF  LOW    Peripheral Pulse Rate 82 bpm    Pulse Site Radial artery    HR Method Manual    Systolic Blood Pressure  158 mmHg  HI    Diastolic Blood Pressure 90 mmHg    Mean Arterial Pressure 113 mmHg    BP Site Left arm    BP Method Manual      Measurements from flowsheet : Measurements   11/9/2017 10:21 AM CST Height Measured - Standard 62 in    Weight Measured - Standard 161.6 lb    BSA 1.79 m2    Body Mass Index 29.55 kg/m2      Documented vital signs:       Blood Pressure: Systolic  130  mmHg, Diastolic  80  mmHg.    General:  Alert and oriented, No acute distress.    Neck:  Supple, Non-tender.    Respiratory:  Lungs are clear to auscultation, Respirations are non-labored.    Cardiovascular:  Normal rate, Regular rhythm, No murmur.    Neurologic:  Alert, Oriented, Normal sensory, Normal motor function, No focal deficits, Cranial Nerves II-XII are grossly intact, Normal deep tendon reflexes.       Results Review   Results review   Lab results   10/13/2017 9:32 AM CDT Sodium Level 140 mmol/L    Potassium Level 4.5 mmol/L    Chloride Level 106 mmol/L    CO2 Level 27 mmol/L    Glucose Level 86 mg/dL    BUN 6 mg/dL  LOW    Creatinine 0.71 mg/dL    BUN/Creat Ratio 8    eGFR 87 mL/min/1.73m2    eGFR African American 101 mL/min/1.73m2    Calcium Level 9.2 mg/dL    Bili Total 0.4 mg/dL    Alk Phos 64 unit/L    AST/SGOT 16 unit/L    ALT/SGPT 13 unit/L    Protein Total 6.5 gm/dL    Albumin Level 4.2 gm/dL    Globulin 2.3    A/G Ratio 1.8    Cholesterol 216 mg/dL  HI    Non-  HI    HDL 66 mg/dL    Chol/HDL Ratio 3.3      HI    Triglyceride 68 mg/dL    WBC 5.2    RBC 4.17    Hgb 13.0 gm/dL    Hct 37.4 %    MCV 89.7 fL    MCH 31.2 pg    MCHC 34.8 gm/dL    RDW 12.5 %    Platelet 345    MPV 10.0 fL         Impression and Plan   Assessment and Plan:          Diagnosis: Anticardiolipin Antibody Positive (XXT99-UM R76.8).    Orders     Orders (Selected)   Prescriptions  Prescribed  clopidogrel 75 mg oral tablet: 1 tab(s) ( 75 mg ), po, daily, # 30 tab(s), 11 Refill(s), Type: Maintenance, [id number], Needs to be  seen for more refills., 1 tab(s) po daily.     .    Assessment and Plan:          Diagnosis: Osteoporosis (ACC95-OD M81.0).    Orders     Orders (Selected)   Prescriptions  Prescribed  calcitonin 200 intl units/inh nasal spray: 1 spray(s), nasal, daily, # 3 EA, 3 Refill(s), Type: Maintenance, Pharmacy: [facility] [id number] [facility], 1 spray(s) nasal daily.     .    Assessment and Plan:          Diagnosis: Familial Hypercholesteremia (VBQ67-VT E78.0).    Orders     Wants to try diet and exercise instead of statins.  Will monitor..     .    Assessment and Plan:  Orders     More than half of a 25 minute visit spent on counseling the above problems..     .

## 2022-02-16 NOTE — NURSING NOTE
Medicare Visit Entered On:  2020 11:37 AM CST    Performed On:  2020 11:28 AM CST by Meredith Stanton CMA               Summary   Chief Complaint :   AWV; arm pain; PT did not help; refills needed   Menstrual Status :   Hysterectomy   Weight Measured :   158.0 lb(Converted to: 158 lb 0 oz, 71.67 kg)    Height Measured :   62 in(Converted to: 5 ft 2 in, 157.48 cm)    Body Mass Index :   28.9 kg/m2 (HI)    Body Surface Area :   1.77 m2   Systolic Blood Pressure :   146 mmHg (HI)    Diastolic Blood Pressure :   60 mmHg   Mean Arterial Pressure :   89 mmHg   Peripheral Pulse Rate :   64 bpm   BP Method :   Manual   HR Method :   Electronic   Oxygen Saturation :   97 %   Meredith Stanton CMA 2020 11:28 AM CST   Health Status   Allergies Verified? :   Yes   Medication History Verified? :   Yes   Immunizations Current :   Yes   Medical History Verified? :   Yes   Pre-Visit Planning Status :   Completed   Tobacco Use? :   Former smoker   Meredith Stanton CMA 2020 11:28 AM CST   Demographics   Last Name :   Juliann   Address :   [address]   First Name :   Mone Wheatley Initial :   A   Responsible Party Date of Birth () :   1950 CST   City :   [place]   State :   [place]   Zip Code :   [address]   Contact Relationship to Patient (other) :   Patient   Meredith Stanton CMA - 2020 11:28 AM CST   Providers Grid   Provider Name :    [facility]   Dr. Ernst-Daquan Hurd RW     Provider Specialty :    Eye care   Dentist   cardiologist   pharmacy     Comments :    Meredith Daniel CMA - 2020 11:28 AM CST  Meredith Stanton CMA 2020 11:28 AM CST  Meredith Stanton CMA 2020 11:28 AM CST  Meredith Stanton CMA 2020 11:28 AM CST      Ancillary Services Grid   Type of Service :    Pharmacy                Meredith Stanton CMA 2020 11:28 AM CST         Consents   Consent for Immunization Exchange :   Consent Granted   Consent for Immunizations to Providers  :   Consent Granted   Meredith Stanton CMA - 2/26/2020 11:28 AM CST   Meds / Allergies   (As Of: 2/26/2020 11:37:33 AM CST)   Allergies (Active)   Lipitor  Estimated Onset Date:   Unspecified ; Reactions:   Myalgias ; Created By:   Lesley Sin; Reaction Status:   Active ; Substance:   Lipitor ; Updated By:   Lesley Sin; Reviewed Date:   2/26/2020 11:32 AM CST      lisinopril  Estimated Onset Date:   Unspecified ; Reactions:   Cough ; Created By:   Lesley Sin; Reaction Status:   Active ; Category:   Drug ; Substance:   lisinopril ; Type:   Allergy ; Updated By:   Lesley Sin; Reviewed Date:   2/26/2020 11:32 AM CST        Medication List   (As Of: 2/26/2020 11:37:33 AM CST)   Prescription/Discharge Order    azithromycin  :   azithromycin ; Status:   Prescribed ; Ordered As Mnemonic:   Zithromax 250 mg oral tablet ; Simple Display Line:   1 packet(s), Oral, once, as directed on package labeling, 6 tab(s), 0 Refill(s) ; Ordering Provider:   Alexus Conti MD; Catalog Code:   azithromycin ; Order Dt/Tm:   9/27/2019 9:57:14 AM CDT          calcitonin  :   calcitonin ; Status:   Prescribed ; Ordered As Mnemonic:   calcitonin 200 intl units/inh nasal spray ; Simple Display Line:   1 spray(s), Nasal, daily, alternate nostrils, 1 EA, 11 Refill(s) ; Ordering Provider:   Alexus Conti MD; Catalog Code:   calcitonin ; Order Dt/Tm:   1/9/2019 10:35:43 AM CST          clopidogrel  :   clopidogrel ; Status:   Prescribed ; Ordered As Mnemonic:   clopidogrel 75 mg oral tablet ; Simple Display Line:   75 mg, 1 tab(s), Oral, daily, 30 tab(s), 0 Refill(s) ; Ordering Provider:   Alexus Conti MD; Catalog Code:   clopidogrel ; Order Dt/Tm:   1/21/2020 11:58:56 AM CST          rosuvastatin  :   rosuvastatin ; Status:   Prescribed ; Ordered As Mnemonic:   rosuvastatin 10 mg oral tablet ; Simple Display Line:   10 mg, 1 tab(s), Oral, hs, 30 tab(s), 0 Refill(s) ; Ordering Provider:   Gallito FITZGERALD, Alexus; Catalog  Code:   rosuvastatin ; Order Dt/Tm:   1/20/2020 11:57:19 AM CST            Home Meds    calcium-vitamin D  :   calcium-vitamin D ; Status:   Documented ; Ordered As Mnemonic:   Calcium 500+D ; Simple Display Line:   1 tab(s), chewed, bid, 0 Refill(s) ; Catalog Code:   calcium-vitamin D ; Order Dt/Tm:   5/5/2016 10:55:40 AM CDT          multivitamin with minerals  :   multivitamin with minerals ; Status:   Documented ; Ordered As Mnemonic:   multivitamin with minerals (w/ Iron) ; Simple Display Line:   po, bid, 0 Refill(s) ; Catalog Code:   multivitamin with minerals ; Order Dt/Tm:   5/5/2016 10:55:57 AM CDT          prednisoLONE ophthalmic  :   prednisoLONE ophthalmic ; Status:   Documented ; Ordered As Mnemonic:   Prednisol 1% ophthalmic solution ; Simple Display Line:   1 drop(s), left eye, daily, mL, PRN: for arthritis ; Catalog Code:   prednisoLONE ophthalmic ; Order Dt/Tm:   8/30/2011 4:15:01 PM CDT            Geriatric Depression Screening   Geriatric Depression Satisfied Life :   Yes   Geriatric Depression Dropped Activities :   No   Geriatric Depression Life Empty :   No   Geriatric Depression Bored :   No   Geriatric Depression Good Spirits :   Yes   Geriatric Depression Afraid Bad Things :   No   Geriatric Depression Feel Happy :   Yes   Geriatric Depression Feel Helpless :   No   Geriatric Depression Prefer to Stay Home :   No   Geriatric Depression Memory Problems :   No   Geriatric Depression Wonderful Be Alive :   Yes   Geriatric Depression Feel Worthless :   No   Geriatric Depression Situation Hopeless :   No   Geriatric Depression Others Better Off :   No   Geriatric Depression Full of Energy :   Yes   Geriatric Depression Total Score :   0    Meredith Stanton CMA 2/26/2020 11:38 AM CST   Hearing and Vision Screening   Audiogram Result Right Ear :   Fail   Audiogram Result Left Ear :   Pass   Hearing Screen Comments :   Missed the 4000Hz on right side   Vision Screen Comments :   [facility]  appt 2/25/2020   Meredith Stanton CMA 2/26/2020 11:28 AM CST   Home Safety Screen   Emergency Numbers Kept/Updated :   No   Aware of Smoking Dangers :   Yes   Smoke Alarms/Fire Extinguisher Available :   Yes   Household Members Fire Safety Knowledge :   Yes   Firearms Unloaded and Secure :   Yes   Floor Rugs Removed or Fastened :   Yes   Mats in Bathtub/Shower :   Yes   Stairway Rails or Banisters :   Yes   Outdoor Clutter Safety :   Yes   Indoor Clutter Safety :   Yes   Electrical Cord Safety :   Yes   Meredith Stanton CMA 2/26/2020 11:38 AM CST   Advance Directives   Advance Directive :   No   Meredith Stanton CMA 2/26/2020 11:28 AM CST   Grewal Fall Risk   History of Fall in Last 3 Months Grewal :   No   Meredith Stanton CMA 2/26/2020 11:28 AM CST   Functional Assessment   Focused Functional Assessment Grid   Bathing :   Independent (2)   Dressing :   Independent (2)   Toileting :   Independent (2)   Transferring Bed or Chair :   Independent (2)   Feeding :   Independent (2)   Meredith Stanton CMA 2/26/2020 11:38 AM CST   Capable of Shopping :   Yes   Capable of Walking :   Yes   Capable of Housekeeping :   Yes   Capable of Managing Medications :   Yes   Capable of Handling Finances :   Yes   Meredith Stanton CMA 2/26/2020 11:38 AM CST

## 2022-02-16 NOTE — TELEPHONE ENCOUNTER
Entered by Skip Theodore MD on January 10, 2021 6:53:05 PM CST  ---------------------  From: Skip Theodore MD   To: [facility] [id number] [facility]    Sent: 1/10/2021 6:53:05 PM CST  Subject: Medication Management     ** Submitted: **  Complete:triamcinolone topical (triamcinolone 0.1% topical ointment)   Signed by Skip Theodore MD  1/11/2021 12:53:00 AM [facility]    ** Approved **  triamcinolone topical (TRIAMCINOLONE 0.1% OINTMENT)  APPLY TO AFFECTED AREA 3 TIMES A DAY  Qty:  30 gm        Days Supply:  20        Refills:  1          Substitutions Allowed     Route To Pharmacy - [facility] [id number] [facility]   Note from Pharmacy:  DX Code Needed  PATIENT REQUESTING MORE REFILLS ON THIS MEDICATION PLEASE..              ------------------------------------------  From: [facility] [id number] [facility]  To: Skip Theodore MD  Sent: January 8, 2021 2:52:47 PM CST  Subject: Medication Management  Due: January 6, 2021 9:13:59 PM CST     ** On Hold Pending Signature **     Dispensed Drug: triamcinolone topical (triamcinolone 0.1% topical ointment), APPLY TO AFFECTED AREA 3 TIMES A DAY  Quantity: 30 gm  Days Supply: 20  Refills: 1  Substitutions Allowed  Notes from Pharmacy: DX Code Needed PATIENT REQUESTING MORE REFILLS ON THIS MEDICATION PLEASE..  ------------------------------------------

## 2022-02-16 NOTE — TELEPHONE ENCOUNTER
---------------------  From: Rochelle Jeff CMA   To: Phone Messages Pool ([id number] - [place]);     Sent: 10/21/2021 10:50:42 AM CDT  Subject: Covid Results     Tried calling pt with negative covid results. Unable to leave a voicemail and call kept getting dropped. Please try contacting pt or inform her of results if she calls back.Tried phone no answer no voice mail.Pt called back and was informed that voicemail was not set up and her results are negative.

## 2022-02-16 NOTE — NURSING NOTE
Comprehensive Intake Entered On:  2020 2:48 PM CST    Performed On:  2020 2:38 PM CST by Ann-Marie Willis               Summary   Chief Complaint :   Rash on hands since August.   Advance Directive :   No   Menstrual Status :   Hysterectomy   Weight Measured :   158 lb(Converted to: 158 lb 0 oz, 71.668 kg)    Height/Length Estimated :   62 in(Converted to: 5 ft 2 in, 157.48 cm)    Systolic Blood Pressure :   142 mmHg (HI)    Diastolic Blood Pressure :   84 mmHg (HI)    Mean Arterial Pressure :   103 mmHg   Peripheral Pulse Rate :   73 bpm   BP Site :   Right arm   BP Method :   Manual   HR Method :   Electronic   Temperature Tympanic :   97.9 DegF(Converted to: 36.6 DegC)    Oxygen Saturation :   99 %   Ann-Marie Willis - 2020 2:38 PM CST   Health Status   Allergies Verified? :   Yes   Medication History Verified? :   Yes   Immunizations Current :   Yes   Medical History Verified? :   Yes   Pre-Visit Planning Status :   Completed   Tobacco Use? :   Former smoker   Ann-Marie Willis - 2020 2:38 PM CST   Demographics   Last Name :   Juliann   Address :   [address]   First Name :   Mone Wheatley Initial :   A   Responsible Party Date of Birth () :   1950 CST   City :   [place]   State :   [place]   Zip Code :   [address]   Contact Relationship to Patient (other) :   Patient   Lazaro Ann-Marie - 2020 2:38 PM CST   Providers Grid   Provider Name :    Namrata Ernst-Daquan Caceres   [facility]     Provider Specialty :    Eye care   Dentist   cardiologist   pharmacy     Comments :                    Ann-Marie Willis - 2020 2:38 PM CST  Ann-Marie Willis - 2020 2:38 PM CST  Ann-Marie Willis - 2020 2:38 PM CST  Ann-Marie Willis - 2020 2:38 PM CST      Ancillary Services Grid   Type of Service :    Pharmacy                Ann-Marie Willis - 2020 2:38 PM CST         Consents   Consent for Immunization Exchange :   Consent Granted   Consent for  Immunizations to Providers :   Consent Granted   Ann-Marie Willis - 12/11/2020 2:38 PM CST   Meds / Allergies   (As Of: 12/11/2020 2:48:36 PM CST)   Allergies (Active)   Lipitor  Estimated Onset Date:   Unspecified ; Reactions:   Myalgias ; Created By:   Lesley Jacobo; Reaction Status:   Active ; Substance:   Lipitor ; Updated By:   Lesley Jacobo; Reviewed Date:   12/11/2020 2:43 PM CST      lisinopril  Estimated Onset Date:   Unspecified ; Reactions:   Cough ; Created By:   Lesley Jacobo; Reaction Status:   Active ; Category:   Drug ; Substance:   lisinopril ; Type:   Allergy ; Updated By:   Lesley Jacobo; Reviewed Date:   12/11/2020 2:43 PM CST        Medication List   (As Of: 12/11/2020 2:48:36 PM CST)   Prescription/Discharge Order    azithromycin  :   azithromycin ; Status:   Completed ; Ordered As Mnemonic:   Zithromax 250 mg oral tablet ; Simple Display Line:   1 packet(s), Oral, once, as directed on package labeling, 6 tab(s), 0 Refill(s) ; Ordering Provider:   Alexus Conti MD; Catalog Code:   azithromycin ; Order Dt/Tm:   9/27/2019 9:57:14 AM CDT          calcitonin  :   calcitonin ; Status:   Prescribed ; Ordered As Mnemonic:   calcitonin 200 intl units/inh nasal spray ; Simple Display Line:   1 spray(s), Nasal, daily, 1 EA, 11 Refill(s) ; Ordering Provider:   Alexus Conti MD; Catalog Code:   calcitonin ; Order Dt/Tm:   2/28/2020 4:26:18 PM CST          clopidogrel  :   clopidogrel ; Status:   Prescribed ; Ordered As Mnemonic:   clopidogrel 75 mg oral tablet ; Simple Display Line:   75 mg, 1 tab(s), Oral, daily, 30 tab(s), 11 Refill(s) ; Ordering Provider:   Alexus Conti MD; Catalog Code:   clopidogrel ; Order Dt/Tm:   2/26/2020 12:06:11 PM CST          rosuvastatin  :   rosuvastatin ; Status:   Prescribed ; Ordered As Mnemonic:   rosuvastatin 10 mg oral tablet ; Simple Display Line:   10 mg, 1 tab(s), Oral, hs, 30 tab(s), 11 Refill(s) ; Ordering Provider:   Gallito FITZGERALD, Alexus;  Catalog Code:   rosuvastatin ; Order Dt/Tm:   2/26/2020 12:06:12 PM CST            Home Meds    calcium-vitamin D  :   calcium-vitamin D ; Status:   Documented ; Ordered As Mnemonic:   Calcium 500+D ; Simple Display Line:   1 tab(s), chewed, bid, 0 Refill(s) ; Catalog Code:   calcium-vitamin D ; Order Dt/Tm:   5/5/2016 10:55:40 AM CDT          multivitamin with minerals  :   multivitamin with minerals ; Status:   Documented ; Ordered As Mnemonic:   multivitamin with minerals (w/ Iron) ; Simple Display Line:   po, bid, 0 Refill(s) ; Catalog Code:   multivitamin with minerals ; Order Dt/Tm:   5/5/2016 10:55:57 AM CDT          prednisoLONE ophthalmic  :   prednisoLONE ophthalmic ; Status:   Documented ; Ordered As Mnemonic:   Prednisol 1% ophthalmic solution ; Simple Display Line:   1 drop(s), left eye, daily, mL, PRN: for arthritis ; Catalog Code:   prednisoLONE ophthalmic ; Order Dt/Tm:   8/30/2011 4:15:01 PM CDT            ID Risk Screen   Recent Travel History :   No recent travel   Family Member Travel History :   No recent travel   Other Exposure to Infectious Disease :   Unknown   Ann-Marie Willis - 12/11/2020 2:38 PM CST   Social History   Social History   (As Of: 12/11/2020 2:48:36 PM CST)   Alcohol:  Current      Beer (12 oz), Wine (5 oz), 1-2 times per year, 2 drinks/episode average.   Comments:  1/29/2015 9:40 AM - Kimberly White LPN: Infrequent alcohol use per pt.   (Last Updated: 3/10/2020 1:50:46 PM CDT by La Crespo)          Tobacco:  Past      Past   Comments:  11/11/2010 1:13 PM - Carmina pSears: Quit in 1977   (Last Updated: 11/11/2010 1:13:19 PM CST by Carmina Spears)   3 packs per day x 10 years.   (Last Updated: 1/16/2019 2:29:52 PM CST by La Crespo)   Former smoker, quit more than 30 days ago   (Last Updated: 12/11/2020 2:40:13 PM CST by Ann-Marie Willis)          Electronic Cigarette/Vaping:        Electronic Cigarette Use: Never.   (Last Updated: 12/11/2020 2:40:18 PM CST by Lazaro  Armando          Substance Abuse:  Past      Marijuana, In college   (Last Updated: 1/16/2019 2:30:23 PM CST by La Crespo)          Employment/School:        Part time, Work/School description: Interior design.  Highest education level: 3 yrs college.   (Last Updated: 3/10/2020 1:49:51 PM CDT by La Crespo)          Home/Environment:        Marital status: .  Spouse/Partner name: Brandon.   (Last Updated: 1/16/2019 2:28:11 PM CST by La Crespo)          Nutrition/Health:        Type of diet: Plant based.  Wants to lose weight: Yes.  Sleeping concerns: Yes.  Feels highly stressed: No.   (Last Updated: 3/10/2020 1:51:15 PM CDT by La Crespo)          Exercise:  Regular exercise      Exercise frequency: Daily.  Exercise type: Walking, Dancing.   (Last Updated: 3/10/2020 1:51:30 PM CDT by La Crespo)          Sexual:        Sexually active: Yes.  Sexual orientation: Straight or heterosexual.   (Last Updated: 1/16/2019 2:30:58 PM CST by La Crespo)          Other:        First menses age 12.  Menstrual duration 5 days.  Cycle interval 25-30 days.  No abnormal Pap smear.   (Last Updated: 1/16/2019 2:28:45 PM CST by La Crespo)

## 2022-02-16 NOTE — LETTER
(Inserted Image. Unable to display)   March 11, 2020      IRENE ADITYA   [place], [place] [id number]        Dear IRENE,      Thank you for selecting [facility] ([facility]viously [facility]) for your healthcare needs.     Our records indicate you are due for the following services:     Clinical Support Staff (CSS)-Only Blood Pressure Check ~ Please stop in anytime to have your blood pressure rechecked. This is a free service and no appointment necessary.    So we can best determine if your medications are effective in lowering your blood pressure, please take your medications 1-2 hours before coming in to have your blood pressure checked.  We encourage you to avoid caffeine or other stimulants prior to having your blood pressure checked and come at a time when you are not feeling rushed.     If you check your blood pressure at home, please bring in your blood pressure monitor and home blood pressure readings.  We will check your machine for accuracy and also share your home readings with your Healthcare Provider.     To schedule an appointment or if you have further questions, please contact your primary clinic:   [facility]          [phone]   [facility]    [phone]             [facility]         [phone]      Powered by [employer]    Sincerely,    Alexus Conti M.D.

## 2022-02-16 NOTE — LETTER
(Inserted Image. Unable to display)     [address] [place] [address] [phone] (phone)  [phone] (fax)  IRENE BURGESS     [place], [place] [id number]        Dear IRENE,    Thank you for selecting our practice as your care provider. Below you will find the results and recent diagnostic testings outcomes we have reviewed.        These are acceptable, please keep scheduled follow up appointments.      Result Name Current Result Previous Result Reference Range   Sodium Level (mmol/L)  141 10/31/2019  140 12/3/2018 135 - 146   Potassium Level (mmol/L)  4.7 10/31/2019  4.8 12/3/2018 3.5 - 5.3   Chloride Level (mmol/L)  106 10/31/2019  103 12/3/2018 98 - 110   CO2 Level (mmol/L)  31 10/31/2019  29 12/3/2018 20 - 32   Glucose Level (mg/dL)  93 10/31/2019  90 12/3/2018 65 - 99   BUN (mg/dL) ((L)) 5 10/31/2019  9 12/3/2018 7 - 25   Creatinine Level (mg/dL)  0.72 10/31/2019  0.74 12/3/2018 0.60 - 0.93   BUN/Creat Ratio  7 10/31/2019  NOT APPLICABLE 12/3/2018 6 - 22   eGFR (mL/min/1.73m2)  84 10/31/2019  82 12/3/2018 > OR = 60 -    Calcium Level (mg/dL)  9.9 10/31/2019  9.4 12/3/2018 8.6 - 10.4   Bilirubin Total (mg/dL)  0.4 10/31/2019  0.5 12/3/2018 0.2 - 1.2   Alkaline Phosphatase (unit/L)  68 10/31/2019  61 12/3/2018 33 - 130   AST/SGOT (unit/L)  20 10/31/2019  17 12/3/2018 10 - 35   ALT/SGPT (unit/L)  16 10/31/2019  18 12/3/2018 6 - 29   Protein Total (gm/dL)  6.6 10/31/2019  6.3 12/3/2018 6.1 - 8.1   Albumin Level (gm/dL)  4.3 10/31/2019  4.0 12/3/2018 3.6 - 5.1   Globulin  2.3 10/31/2019  2.3 12/3/2018 1.9 - 3.7   A/G Ratio  1.9 10/31/2019  1.7 12/3/2018 1.0 - 2.5   Cholesterol (mg/dL)  180 10/31/2019 ((H)) 240 12/3/2018  - <200   Non-HDL Cholesterol  100 10/31/2019 ((H)) 171 12/3/2018  - <130   HDL (mg/dL)  80 10/31/2019  69 12/3/2018 >50 -    Cholesterol/HDL Ratio  2.3 10/31/2019  3.5 12/3/2018  - <5.0   LDL  84 10/31/2019 ((H)) 153 12/3/2018    Triglyceride (mg/dL)  70 10/31/2019  78  12/3/2018  - <150   WBC  5.4 10/31/2019  6.0 12/3/2018 3.8 - 10.8   RBC  4.38 10/31/2019  4.26 12/3/2018 3.80 - 5.10   Hgb (gm/dL)  13.5 10/31/2019  12.8 12/3/2018 11.7 - 15.5   Hct (%)  40.0 10/31/2019  37.4 12/3/2018 35.0 - 45.0   MCV (fL)  91.3 10/31/2019  87.8 12/3/2018 80.0 - 100.0   MCH (pg)  30.8 10/31/2019  30.0 12/3/2018 27.0 - 33.0   MCHC (gm/dL)  33.8 10/31/2019  34.2 12/3/2018 32.0 - 36.0   RDW (%)  12.2 10/31/2019  12.5 12/3/2018 11.0 - 15.0   Platelet  323 10/31/2019  374 12/3/2018 140 - 400   MPV (fL)  10.4 10/31/2019  10.3 12/3/2018 7.5 - 12.5       Please contact my practice at the number listed above if you have any questions or concerns.     Sincerely,        Skip Theodore MD, FAAFP

## 2022-02-16 NOTE — TELEPHONE ENCOUNTER
Entered by Mary Valverde MA on February 28, 2020 4:26:37 PM CST  ---------------------  From: Mary Valverde MA   To: [facility] [id number] [facility]    Sent: 2/28/2020 4:26:37 PM CST  Subject: Medication Management     ** Submitted: **  Order:calcitonin (calcitonin 200 intl units/inh nasal spray)  1 spray(s)  Nasal  daily  Qty:  1 EA        Refills:  11          Substitutions Allowed     Route To Pharmacy - [place] [address] [facility]    Signed by Mary Valverde MA  2/28/2020 4:26:00 PM    ** Submitted: **  Complete:calcitonin (calcitonin 200 intl units/inh nasal spray)   Signed by Mary Valverde MA  2/28/2020 4:26:00 PM    ** Not Approved:  **  calcitonin (CALCITONIN-SALMON 200 UNITS SP)  PLACE 1 SPRAY IN EACH NOSTRIL ALTERNATING NOSTRILS EVERY DAY  Qty:  3.7 spray(s)        Days Supply:  30        Refills:  12          Substitutions Allowed     Route To Pharmacy - [place] [address] [facility]   Signed by Mary Valverde MA            ------------------------------------------  From: [place] [address] [facility]  To: Alexus Conti MD  Sent: February 28, 2020 2:28:07 PM CST  Subject: Medication Management  Due: February 29, 2020 2:28:07 PM CST    ** On Hold Pending Signature **  Drug: calcitonin (calcitonin 200 intl units/inh nasal spray)  PLACE 1 SPRAY IN EACH NOSTRIL ALTERNATING NOSTRILS EVERY DAY  Quantity: 3.7 spray(s)  Days Supply: 30  Refills: 12  Substitutions Allowed  Notes from Pharmacy:     Dispensed Drug: calcitonin (calcitonin 200 intl units/inh nasal spray)  PLACE 1 SPRAY IN EACH NOSTRIL ALTERNATING NOSTRILS EVERY DAY  Quantity: 3.7 spray(s)  Days Supply: 30  Refills: 12  Substitutions Allowed  Notes from Pharmacy:   ------------------------------------------Date of last office visit and reason:  2/26/20      Date of last Med Check / Px:   2/26/20  Date of last labs pertaining to med:  n/a    RTC order in chart:  yes    For Protocol refill, has patient been contacted:  n/a

## 2022-02-16 NOTE — NURSING NOTE
CAGE Assessment Entered On:  3/1/2021 1:17 PM CST    Performed On:  3/1/2021 1:17 PM CST by Erica Levine CMA               Assessment   Have you ever felt you should cut down on your drinking :   No   Have people annoyed you by criticizing your drinking :   No   Have you ever felt bad or guilty about your drinking :   No   Have you ever taken a drink first thing in the morning to steady your nerves or get rid of a hangover (Eye-opener) :   No   CAGE Score :   0    Erica Levine CMA - 3/1/2021 1:17 PM CST

## 2022-02-16 NOTE — LETTER
(Inserted Image. Unable to display)   January 08, 2020      IRENE BURGESS   [place], [place] [id number]        Dear IRENE,      Thank you for selecting [facility] (previously [facility]) for your healthcare needs.     Our records indicate you are due for the following services:     Annual Physical    To schedule an appointment or if you have further questions, please contact your primary clinic:   [facility]          [phone]   [facility]    [phone]             [facility]         [phone]      Powered by [employer]    Sincerely,    Alexus Cotni M.D.

## 2022-02-16 NOTE — LETTER
(Inserted Image. Unable to display)   [address] [place] [address]   March 02, 2021        IRENE BURGESS       [place], [place] [id number]      Dear IRENE,    Thank you for selecting [facility] for your healthcare needs. Below you will find the results of the recent tests done at our clinic.     Your lab results are all stable. We should recheck again next year.    Result Name Current Result Previous Result Reference Range   Sodium Level (mmol/L)  141 3/1/2021  141 10/31/2019 135 - 146   Potassium Level (mmol/L)  4.0 3/1/2021  4.7 10/31/2019 3.5 - 5.3   Chloride Level (mmol/L)  104 3/1/2021  106 10/31/2019 98 - 110   CO2 Level (mmol/L)  29 3/1/2021  31 10/31/2019 20 - 32   Glucose Level (mg/dL)  86 3/1/2021  93 10/31/2019 65 - 99   BUN (mg/dL) ((L)) 5 3/1/2021 ((L)) 5 10/31/2019 7 - 25   Creatinine Level (mg/dL)  0.72 3/1/2021  0.72 10/31/2019 0.60 - 0.93   BUN/Creat Ratio  7 3/1/2021  7 10/31/2019 6 - 22   eGFR (mL/min/1.73m2)  84 3/1/2021  84 10/31/2019 > OR = 60 -    eGFR  (mL/min/1.73m2)  97 3/1/2021  98 10/31/2019 > OR = 60 -    Calcium Level (mg/dL)  9.5 3/1/2021  9.9 10/31/2019 8.6 - 10.4   Bilirubin Total (mg/dL)  0.4 3/1/2021  0.4 10/31/2019 0.2 - 1.2   Alkaline Phosphatase (unit/L)  64 3/1/2021  68 10/31/2019 37 - 153   AST/SGOT (unit/L)  20 3/1/2021  20 10/31/2019 10 - 35   ALT/SGPT (unit/L)  19 3/1/2021  16 10/31/2019 6 - 29   Protein Total (gm/dL)  6.5 3/1/2021  6.6 10/31/2019 6.1 - 8.1   Albumin Level (gm/dL)  4.2 3/1/2021  4.3 10/31/2019 3.6 - 5.1   Globulin  2.3 3/1/2021  2.3 10/31/2019 1.9 - 3.7   A/G Ratio  1.8 3/1/2021  1.9 10/31/2019 1.0 - 2.5   Cholesterol (mg/dL)  147 3/1/2021  180 10/31/2019  - <200   Non-HDL Cholesterol  79 3/1/2021  100 10/31/2019  - <130   HDL (mg/dL)  68 3/1/2021  80 10/31/2019 > OR = 50 -    Cholesterol/HDL Ratio  2.2 3/1/2021  2.3 10/31/2019  - <5.0   LDL  58 3/1/2021  84 10/31/2019    Triglyceride  (mg/dL)  131 3/1/2021  70 10/31/2019  - <150   WBC  4.5 3/1/2021  5.4 10/31/2019 3.8 - 10.8   RBC  4.16 3/1/2021  4.38 10/31/2019 3.80 - 5.10   Hgb (gm/dL)  12.7 3/1/2021  13.5 10/31/2019 11.7 - 15.5   Hct (%)  38.2 3/1/2021  40.0 10/31/2019 35.0 - 45.0   MCV (fL)  91.8 3/1/2021  91.3 10/31/2019 80.0 - 100.0   MCH (pg)  30.5 3/1/2021  30.8 10/31/2019 27.0 - 33.0   MCHC (gm/dL)  33.2 3/1/2021  33.8 10/31/2019 32.0 - 36.0   RDW (%)  12.5 3/1/2021  12.2 10/31/2019 11.0 - 15.0   Platelet  323 3/1/2021  323 10/31/2019 140 - 400   MPV (fL)  10.6 3/1/2021  10.4 10/31/2019 7.5 - 12.5   Lymphocytes (%)  33.7 3/1/2021     Abs Lymphocytes  1,517 3/1/2021  850 - 3,900   Neutrophils (%)  48.9 3/1/2021     Abs Neutrophils  2,201 3/1/2021  1,500 - 7,800   Monocytes (%)  10.8 3/1/2021     Abs Monocytes  486 3/1/2021  200 - 950   Eosinophils (%)  5.5 3/1/2021     Abs Eosinophils  248 3/1/2021  15 - 500   Basophils (%)  1.1 3/1/2021     Abs Basophils  50 3/1/2021  0 - 200       Please contact me or my assistant at [phone] if you have any questions or concerns.     Sincerely,        Alexus Conti M.D.

## 2022-02-16 NOTE — TELEPHONE ENCOUNTER
Entered by Meredith Stanton CMA on February 19, 2020 2:51:58 PM CST  Return Call    Time: 2:50pm    Note: Called patient, mailbox is full unable to leave a message.         Entered by Gardenia Gonzáles CMA on February 18, 2020 11:07:11 AM CST  Time: 11 06am  Note: Called patient: Mailbox full- unable to leave voicemail      Entered by Meredith Stanton CMA on February 14, 2020 8:20:42 AM CST  Patient is over due for her AWV. Tried to call patient. Unable to leave a message due to the mailbox being full.       ------------------------------------------  From: [facility] [id number] [facility]  To: Alexus Conti MD  Sent: February 14, 2020 2:00:40 AM CST  Subject: Medication Management  Due: February 15, 2020 2:00:40 AM CST    ** On Hold Pending Signature **  Drug: clopidogrel (clopidogrel 75 mg oral tablet)  TAKE 1 TABLET BY MOUTH EVERY DAY  Quantity: 30 tab(s)  Days Supply: 30  Refills: 0  Substitutions Allowed  Notes from Pharmacy:     Dispensed Drug: clopidogrel (clopidogrel 75 mg oral tablet)  TAKE 1 TABLET BY MOUTH EVERY DAY  Quantity: 30 tab(s)  Days Supply: 30  Refills: 0  Substitutions Allowed  Notes from Pharmacy:   ---------------------------------------------------------------  From: Meredith Stanton CMA   To: [facility] [address] [facility]    Sent: 2/24/2020 12:25:34 PM CST  Subject: Medication Management     ** Not Approved: Patient needs appointment, Called patient has enough until appt with [facility] **  clopidogrel (CLOPIDOGREL 75 MG TABLET)  TAKE 1 TABLET BY MOUTH EVERY DAY  Qty:  30 tab(s)        Days Supply:  30        Refills:  0          Substitutions Allowed     Route To Pharmacy - [facility] [id number] [facility]   Signed by Meredith Stanton CMA

## 2022-02-16 NOTE — PROGRESS NOTES
Patient:   IRENE BURGESS            MRN: [id number]            FIN: [id number]               Age:   67 years     Sex:  Female     :  1950   Associated Diagnoses:   Infected insect bite   Author:   Alexus Conti MD      Visit Information      Date of Service: 2017 08:35 am  Performing Location: [facility]  Encounter#: [id number]      Chief Complaint   2017 8:39 AM CST   Suture Removal on left upper thigh       Patient here for suture removal. Redness and induration is gone. Reviewed biopsy results.      Physical Examination   Vital Signs   2017 8:39 AM CST Temperature Tympanic 98.1 DegF    Peripheral Pulse Rate 68 bpm    Pulse Site Radial artery    HR Method Manual    Systolic Blood Pressure 118 mmHg    Diastolic Blood Pressure 66 mmHg    Mean Arterial Pressure 83 mmHg    BP Site Left arm    BP Method Manual      Measurements from flowsheet : Measurements   2017 8:39 AM CST   Ht/Wt Measurement Refused by Patient?     Yes        Procedure   Staple/ Suture removal procedure   Performed by: attending physician.     Wound assessment:: characteristics not bleeding, course progressing as expected, no discharge, no inflammatory changes.     Dressing applied: none.     Procedure tolerated: well.     Complications: none.        Impression and Plan   Diagnosis     Infected insect bite ([id number]-QL W57.XXXA).     Orders     Appears to be healing well.  Follow up as needed..

## 2022-02-16 NOTE — LETTER
(Inserted Image. Unable to display)   July 09, 2019      IRENE BURGESS   [place], [place] [id number]        Dear IRENE,      Thank you for selecting [facility] ([facility]viously [facility]) for your healthcare needs.     Our records indicate you are due for the following services:     Fasting Lab Tests ~ Please do not eat or drink anything 10 hours prior to your scheduled appointment time.                (Water and any medications that you may need are allowed unless directed otherwise.)    If you had your labs done at another facility or with [facility] at [facility], please bring in a copy of the results to your next visit, mail a copy, or drop off a copy of your results to your Healthcare Provider.    To schedule an appointment or if you have further questions, please contact your primary clinic:   [facility]          [phone]   [facility]    [phone]             [facility]         [phone]      Powered by [employer]    Sincerely,    Alexus Conti M.D.

## 2022-02-16 NOTE — TELEPHONE ENCOUNTER
Entered by Meredith Stanton CMA on January 20, 2020 11:58:02 AM CST  ---------------------  From: Meredith Stanton CMA   To: [facility] [id number] [facility]    Sent: 1/20/2020 11:58:01 AM CST  Subject: Medication Management     ** Submitted: **  Order:rosuvastatin (rosuvastatin 10 mg oral tablet)  1 tab(s)  Oral  hs  Qty:  30 tab(s)        Refills:  0          Substitutions Allowed     Route To Pharmacy - [place] [address] [facility]    Signed by Meredith Stanton CMA  1/20/2020 11:57:00 AM    ** Submitted: **  Complete:rosuvastatin (Crestor 10 mg oral tablet)   Signed by Meredith Stanton CMA  1/20/2020 11:57:00 AM    ** Not Approved:  **  rosuvastatin (ROSUVASTATIN CALCIUM 10 MG TAB)  TAKE 1 TABLET BY MOUTH EVERYDAY AT BEDTIME  Qty:  30 tab(s)        Days Supply:  30        Refills:  8          Substitutions Allowed     Route To Pharmacy - [place] [address] [facility]   Signed by Meredith Stanton CMA            ** Patient matched by Meredith Stanton CMA on 1/20/2020 11:54:27 AM CST **      ------------------------------------------  From: [place] [address] [facility]  To: Alexus Conti MD  Sent: January 20, 2020 1:03:58 AM CST  Subject: Medication Management  Due: January 21, 2020 1:03:58 AM CST    ** On Hold Pending Signature **  Drug: rosuvastatin (rosuvastatin 10 mg oral tablet)  TAKE 1 TABLET BY MOUTH EVERYDAY AT BEDTIME  Quantity: 30 tab(s)  Days Supply: 30  Refills: 8  Substitutions Allowed  Notes from Pharmacy:     Dispensed Drug: rosuvastatin (rosuvastatin 10 mg oral tablet)  TAKE 1 TABLET BY MOUTH EVERYDAY AT BEDTIME  Quantity: 30 tab(s)  Days Supply: 30  Refills: 8  Substitutions Allowed  Notes from Pharmacy:   ------------------------------------------Date of last office visit and reason:  1/9/19AWV       Date of last Med Check / Px:   1/9//19  Date of last labs pertaining to med:  11/1/19    RTC order in chart:  yes due now    For Protocol refill, has patient been contacted:  yes, tried to call patient, unable to leave a message due to Mailbox being full.  Patient is due for AWV only refilled for 30 days 0 refills

## 2022-02-16 NOTE — NURSING NOTE
Comprehensive Intake Entered On:  9/27/2019 9:49 AM CDT    Performed On:  9/27/2019 9:45 AM CDT by Meredith Stanton CMA               Summary   Chief Complaint :   cough and cold; x1 week; cough is getting worse; runny nose   Weight Measured :   151.6 lb(Converted to: 151 lb 10 oz, 68.76 kg)    Height Measured :   62 in(Converted to: 5 ft 2 in, 157.48 cm)    Body Mass Index :   27.72 kg/m2 (HI)    Body Surface Area :   1.73 m2   Systolic Blood Pressure :   100 mmHg   Diastolic Blood Pressure :   60 mmHg   Mean Arterial Pressure :   73 mmHg   Peripheral Pulse Rate :   68 bpm   Temperature Tympanic :   96.1 DegF(Converted to: 35.6 DegC)  (LOW)    Oxygen Saturation :   96 %   Meredith Stanton CMA - 9/27/2019 9:45 AM CDT   Health Status   Allergies Verified? :   Yes   Medication History Verified? :   Yes   Immunizations Current :   Yes   Medical History Verified? :   Yes   Pre-Visit Planning Status :   Completed   Meredith Stanton CMA - 9/27/2019 9:45 AM CDT   Consents   Consent for Immunization Exchange :   Consent Granted   Consent for Immunizations to Providers :   Consent Granted   Meredith Stanton CMA - 9/27/2019 9:45 AM CDT   Meds / Allergies   (As Of: 9/27/2019 9:49:31 AM CDT)   Allergies (Active)   Lipitor  Estimated Onset Date:   Unspecified ; Reactions:   Myalgias ; Created By:   Lesley Sin; Reaction Status:   Active ; Substance:   Lipitor ; Updated By:   Lesley Sin; Reviewed Date:   9/27/2019 9:47 AM CDT      lisinopril  Estimated Onset Date:   Unspecified ; Reactions:   Cough ; Created By:   Lesley Sin; Reaction Status:   Active ; Category:   Drug ; Substance:   lisinopril ; Type:   Allergy ; Updated By:   Lesley Sin; Reviewed Date:   9/27/2019 9:47 AM CDT        Medication List   (As Of: 9/27/2019 9:49:31 AM CDT)   Prescription/Discharge Order    calcitonin  :   calcitonin ; Status:   Prescribed ; Ordered As Mnemonic:   calcitonin 200 intl units/inh nasal spray ; Simple Display Line:   1  spray(s), Nasal, daily, alternate nostrils, 1 EA, 11 Refill(s) ; Ordering Provider:   Alexus Conti MD; Catalog Code:   calcitonin ; Order Dt/Tm:   1/9/2019 10:35:43 AM          clopidogrel  :   clopidogrel ; Status:   Prescribed ; Ordered As Mnemonic:   clopidogrel 75 mg oral tablet ; Simple Display Line:   75 mg, 1 tab(s), Oral, daily, 30 tab(s), 11 Refill(s) ; Ordering Provider:   Alexus Conti MD; Catalog Code:   clopidogrel ; Order Dt/Tm:   1/9/2019 10:35:43 AM          rosuvastatin  :   rosuvastatin ; Status:   Prescribed ; Ordered As Mnemonic:   Crestor 10 mg oral tablet ; Simple Display Line:   10 mg, 1 tab(s), Oral, hs, 30 tab(s), 11 Refill(s) ; Ordering Provider:   Alexus Conti MD; Catalog Code:   rosuvastatin ; Order Dt/Tm:   1/9/2019 10:35:04 AM            Home Meds    calcium-vitamin D  :   calcium-vitamin D ; Status:   Documented ; Ordered As Mnemonic:   Calcium 500+D ; Simple Display Line:   1 tab(s), chewed, bid, 0 Refill(s) ; Catalog Code:   calcium-vitamin D ; Order Dt/Tm:   5/5/2016 10:55:40 AM          multivitamin with minerals  :   multivitamin with minerals ; Status:   Documented ; Ordered As Mnemonic:   multivitamin with minerals (w/ Iron) ; Simple Display Line:   po, bid, 0 Refill(s) ; Catalog Code:   multivitamin with minerals ; Order Dt/Tm:   5/5/2016 10:55:57 AM          prednisoLONE ophthalmic  :   prednisoLONE ophthalmic ; Status:   Documented ; Ordered As Mnemonic:   Prednisol 1% ophthalmic solution ; Simple Display Line:   1 drop(s), left eye, daily, mL, PRN: for arthritis ; Catalog Code:   prednisoLONE ophthalmic ; Order Dt/Tm:   8/30/2011 4:15:01 PM

## 2022-02-16 NOTE — TELEPHONE ENCOUNTER
Entered by Meredith Stanton CMA on February 24, 2020 12:24:29 PM CST  ---------------------  From: Meredith Stanton CMA   To: [facility] [id number] [facility]    Sent: 2/24/2020 12:24:29 PM CST  Subject: Medication Management     ** Not Approved: Patient needs appointment, Called patient she is due for an appt, has enough until appt. **  rosuvastatin (ROSUVASTATIN CALCIUM 10 MG TAB)  TAKE 1 TABLET BY MOUTH EVERYDAY AT BEDTIME  Qty:  30 tab(s)        Days Supply:  30        Refills:  0          Substitutions Allowed     Route To Pharmacy - [place] [address] [facility]   Signed by Meredith Stanton CMA                 ------------------------------------------  From: [facility] [id number] [facility]  To: Alexus Conti MD  Sent: February 24, 2020 9:30:55 AM CST  Subject: Medication Management  Due: February 25, 2020 9:30:55 AM CST    ** On Hold Pending Signature **  Drug: rosuvastatin (rosuvastatin 10 mg oral tablet)  TAKE 1 TABLET BY MOUTH EVERYDAY AT BEDTIME  Quantity: 30 tab(s)  Days Supply: 30  Refills: 0  Substitutions Allowed  Notes from Pharmacy:     Dispensed Drug: rosuvastatin (rosuvastatin 10 mg oral tablet)  TAKE 1 TABLET BY MOUTH EVERYDAY AT BEDTIME  Quantity: 30 tab(s)  Days Supply: 30  Refills: 0  Substitutions Allowed  Notes from Pharmacy:   ------------------------------------------Called patient she has enough until appt with [facility], transferred to make an appt.

## 2022-02-16 NOTE — PROGRESS NOTES
Patient:   IRENE BURGESS            MRN: [id number]            FIN: [id number]               Age:   69 years     Sex:  Female     :  1950   Associated Diagnoses:   Anticardiolipin Antibody Positive; Familial Hypercholesteremia; Medicare annual wellness visit, subsequent   Author:   Gallito FITZGERALD, Alexus      Visit Information      Primary Care Provider (PCP):  Arben FITZGERALD, Skip LAWTONI# [id number]      Chief Complaint   2020 11:28 AM CST   AWV; arm pain; PT did not help; refills needed   Here for annual wellness physical for Medicare      History of Present Illness     Current medical providers reviewed with patient updated on demographics in chart as listed on the patient health history form.  Depression screening completed and history reviewed with patient, no concerns.  CAGE reviewed with patient, no concerns.  Functional ability/Health Risk assessment reviewed:   Hearing impairment - denies concerns   Activities of daily living - independent without concerns   Fall risks - denies falls in past year, no assistance required with walking or transfer   Home safety issues reviewed, no concerns raised.  Cognitive impairment evaluated, patient oriented to year, date, location, self.  No deficits notes.  Cognitive screening and dementia symptoms reviewed.  Advanced care planning discussed.  Patient is welcome to provide paperwork to us so that we can copy into electronic medical record.  Preventive services reviewed and documented on preventive services checklist.    Also history of elevated cholesterol, tolerating rosuvastatin, lipids checked in the fall. No changes at this time. Plan repeat fasting labs in one year  Also hx of anticardiolipin antibody, on plavix, no issues with medication. No symptoms. Tolerates well.  Discussed elevated BP today, has not been an issue previously.  Notes that she has been having pain in right upper arm, did PT but didn't resolve. Also has knee pain. Plan is for knee  replacement this spring or summer           Review of Systems   ROS reviewed as documented in chart      Health Status   Allergies:    Allergic Reactions (Selected)  Severity Not Documented  Lipitor (Myalgias)  Lisinopril (Cough)   Medications:  (Selected)   Prescriptions  Prescribed  Zithromax 250 mg oral tablet: = 1 packet(s), Oral, once, Instructions: as directed on package labeling, # 6 tab(s), 0 Refill(s), Type: Soft Stop, Pharmacy: [place] [id number] [place] [facility], 1 packet(s) Oral once,Instr:as directed on package labeling  calcitonin 200 intl units/inh nasal spray: = 1 spray(s), Nasal, daily, Instructions: alternate nostrils, # 1 EA, 11 Refill(s), Type: Maintenance, Pharmacy: [place] [id number] [place] [facility], 1 spray(s) Nasal daily,Instr:alternate nostrils  clopidogrel 75 mg oral tablet: = 1 tab(s) ( 75 mg ), Oral, daily, # 30 tab(s), 0 Refill(s), Type: Maintenance, Pharmacy: [place] [id number] [place] [facility], due for annual Px prior to next refill  rosuvastatin 10 mg oral tablet: = 1 tab(s) ( 10 mg ), Oral, hs, # 30 tab(s), 0 Refill(s), Type: Maintenance, Pharmacy: [place] [address] [place] [facility], Due for annual Px prior to next refill  Documented Medications  Documented  Calcium 500+D: 1 tab(s), chewed, bid, 0 Refill(s), Type: Maintenance  Prednisol 1% ophthalmic solution: 1 drop(s), left eye, daily, PRN: for arthritis, mL, 0 Refill(s), Type: Maintenance  multivitamin with minerals (w/ Iron): po, bid, 0 Refill(s), Type: Maintenance   Problem list:    All Problems  Osteopenia / SNOMED CT 454982721 / Confirmed  Glaucoma, Narrow-Angle / ICD-9-.20 / Confirmed  Familial Hypercholesteremia / SNOMED CT 724965337 / Confirmed  Colon cancer screening / SNOMED CT 541564382 / Confirmed  Anticardiolipin Antibody Positive / ICD-9-.79 / Confirmed  Allergic Rhinitis / ICD-9-.9 / Confirmed  Inactive: Osteoporosis / ICD-9-.00  Resolved: Tobacco abuse / ICD-9-.1      Histories   Past Medical History:    Active  Anticardiolipin Antibody Positive  (ICD-9-.79): Onset in  at 51 years.  Allergic Rhinitis (ICD-9-.9)  Glaucoma, Narrow-Angle (ICD-9-.20)  Familial Hypercholesteremia (SNOMED CT 872058509)  Osteopenia (SNOMED CT 742933428)  Resolved  Tobacco abuse (ICD-9-.1):  Resolved.   Family History:    Peptic ulcer disease  Father  Grandfather (P)  Grandmother (P)  CA - Lung cancer  Mother ()  Cerebrovascular accident  Father  Antiphospholipid syndrome  Sister  Stroke  Father  Kidney disease  Mother ()  Grandmother (M)  Grandfather (M)  Tobacco abuse  Mother ()  Depression  Brother  Son (Jet)  Brother  Endometriosis  Sister     Procedure history:    Colonoscopy (652174384) on 2016 at 66 Years.  Comments:  2016 8:32 AM CDT - [place]  Indication:  Heme positive stool.  Sedation:  Midazolam 3 mg IV, Fentanyl 150 mcg IV.  Impression:  Internal hemorrhoids that prolapse with straining, but require manual replacement into the anal canal (Grade III) found on perianal exam.  Significant looping of the colon.  Normal colon.  Hemorrhoids.  Oophorectomy (207278424) on 1/3/2007 at 56 Years.  Resection (206720419) on 1/3/2007 at 56 Years.  Comments:  2010 1:09 PM Carmina Thibodeaux  of vaginal septum  Hysterectomy (594405359) in the month of 2007 at 56 Years.  Comments:  2010 1:05 PM Carmina Thibodeaux  laparoscopic-assisted vaginal  Hysteroscopy (527645395) in  at 56 Years.  Bunionectomy (12913841) on 2000 at 50 Years.  Osteotomy (821276332) on 2000 at 50 Years.  Tonsillectomy and adenoidectomy (482859423).  Appendectomy (041958972).   section (44649767).   section (52707342).  Dilation and curettage (11070147).   Social History:        Alcohol Assessment: Current            0 drinks/episode average.  1 drinks/episode maximum.                     Comments:                      2015 - Kimberly White LPN                     Infrequent alcohol use per pt.       Tobacco Assessment: Past            3 packs per day x 10 years.            Past                     Comments:                      11/11/2010 - Carmina Spears                     Quit in 1977      Substance Abuse Assessment: Past            Marijuana, In college      Employment and Education Assessment            Part time, Work/School description: Interior design.      Home and Environment Assessment            Marital status: .  Spouse/Partner name: Brandon.      Exercise and Physical Activity Assessment: Regular exercise            Exercise frequency: 1-2 times/week.  Exercise type: Walking, Dancing.      Sexual Assessment            Sexually active: Yes.  Sexual orientation: Straight or heterosexual.      Other Assessment            First menses age 12.  Menstrual duration 5 days.  Cycle interval 25-30 days.  No abnormal Pap smear.        Physical Examination   Vital Signs   2/26/2020 11:28 AM CST Peripheral Pulse Rate 64 bpm    HR Method Electronic    Systolic Blood Pressure 146 mmHg  HI    Diastolic Blood Pressure 60 mmHg    Mean Arterial Pressure 89 mmHg    BP Method Manual    Oxygen Saturation 97 %      Measurements from flowsheet : Measurements   2/26/2020 11:28 AM CST Height Measured - Standard 62 in    Weight Measured - Standard 158.0 lb    BSA 1.77 m2    Body Mass Index 28.9 kg/m2  HI      General:  Alert and oriented, No acute distress.    Eye:  Pupils are equal, round and reactive to light, Extraocular movements are intact, Normal conjunctiva.    HENT:  Normocephalic, Tympanic membranes are clear, Oral mucosa is moist, No pharyngeal erythema.    Neck:  Supple, Non-tender, No lymphadenopathy, No thyromegaly.    Respiratory:  Lungs are clear to auscultation.    Cardiovascular:  Normal rate, Regular rhythm.    Breast:  No mass, No tenderness, No discharge.    Gastrointestinal:  Soft, Non-tender, Non-distended, No organomegaly.    Musculoskeletal:  Normal range of motion, Normal strength.     Integumentary:  Warm, Dry, Pink, No rash, no concerning lesions.    Neurologic:  Alert, Oriented, Normal sensory.    Psychiatric:  Cooperative, Appropriate mood & affect.       Impression and Plan   Diagnosis     Medicare annual wellness visit, subsequent (BDD78-EU Z00.00).     Course:  Progressing as expected.    Patient Instructions:       Counseled: Patient, Regarding diagnosis, Regarding treatment, Regarding medications, Diet, Activity, Prognosis/ end-of-life issues, BMI, exercise, healthy eating, home safety, depression.    Summary:  Preventive services recommended as noted on preventive services checklist..    Diagnosis     Anticardiolipin Antibody Positive (LWD40-YE R76.8).     Familial Hypercholesteremia (ISQ66-JC E78.01).     Course:  cholesterol levels checked in the fall, continue to monitor, tolerating rosuvastatin, no changes  continues on plavix which has been well tolerated, Anticipating surgery for knee replacement, will need preop to discuss anticoagulation prior to surgery.    Orders     Orders   Requests (Return to Office):  Return to Clinic (Request) (Order): RFV: nurse visit for BP recheck, Return in 2 weeks.

## 2022-02-16 NOTE — NURSING NOTE
Medicare Visit Entered On:  3/1/2021 12:25 PM CST    Performed On:  3/1/2021 12:17 PM CST by Mildred Bullard MA               Summary   Chief Complaint :   AWV   Advance Directive :   No   Menstrual Status :   Hysterectomy   Weight Measured :   158 lb(Converted to: 158 lb 0 oz, 71.668 kg)    Height Measured :   62 in(Converted to: 5 ft 2 in, 157.48 cm)    Body Mass Index :   28.9 kg/m2 (HI)    Body Surface Area :   1.77 m2   Height/Length Estimated :   62 in(Converted to: 5 ft 2 in, 157.48 cm)    Systolic Blood Pressure :   160 mmHg (HI)    Diastolic Blood Pressure :   90 mmHg (HI)    Mean Arterial Pressure :   113 mmHg   Peripheral Pulse Rate :   77 bpm   BP Site :   Right arm   Pulse Site :   Radial artery   BP Method :   Manual   HR Method :   Manual   Temperature Tympanic :   96.8 DegF(Converted to: 36.0 DegC)  (LOW)    Oxygen Saturation :   97 %   Mildred Bullard MA - 3/1/2021 12:17 PM CST   Health Status   Allergies Verified? :   Yes   Medication History Verified? :   Yes   Immunizations Current :   Yes   Medical History Verified? :   Yes   Pre-Visit Planning Status :   Completed   Tobacco Use? :   Former smoker   Mildred Bullard MA - 3/1/2021 12:17 PM CST   Demographics   Last Name :   Juliann   Address :   [address]   First Name :   Mone Wheatley Initial :   A   Responsible Party Date of Birth () :   1950 CST   City :   [place]   State :   [place]   Zip Code :   [address]   Contact Relationship to Patient (other) :   Patient   Mildred Bullard MA - 3/1/2021 12:17 PM CST   Providers Grid   Provider Name :    Namrata Ernst-Daquan Hurd RW     Provider Specialty :    Eye care   Dentist   cardiologist   pharmacy     Comments :                    Mildred Bullard MA 3/1/2021 12:17 PM CST  Mildred Bullard MA 3/1/2021 12:17 PM CST  Mildred Bullard MA - 3/1/2021 12:17 PM CST  Mildred Bullard MA 3/1/2021 12:17 PM CST      Ancillary Services Grid   Type of Service :    Pharmacy                 Mildred Bullard MA - 3/1/2021 12:17 PM CST         Consents   Consent for Immunization Exchange :   Consent Granted   Consent for Immunizations to Providers :   Consent Granted   Mildred Bullard MA - 3/1/2021 12:17 PM CST   Meds / Allergies   (As Of: 3/1/2021 12:25:45 PM CST)   Allergies (Active)   Lipitor  Estimated Onset Date:   Unspecified ; Reactions:   Myalgias ; Created By:   Lesley Jacobo; Reaction Status:   Active ; Substance:   Lipitor ; Updated By:   Lesley Jacobo; Reviewed Date:   12/11/2020 2:53 PM CST      lisinopril  Estimated Onset Date:   Unspecified ; Reactions:   Cough ; Created By:   Lesley Jacobo; Reaction Status:   Active ; Category:   Drug ; Substance:   lisinopril ; Type:   Allergy ; Updated By:   Lesley Jacobo; Reviewed Date:   12/11/2020 2:53 PM CST        Medication List   (As Of: 3/1/2021 12:25:45 PM CST)   Prescription/Discharge Order    calcitonin  :   calcitonin ; Status:   Prescribed ; Ordered As Mnemonic:   calcitonin 200 intl units/inh nasal spray ; Simple Display Line:   1 spray(s), Nasal, daily, 1 EA, 2 Refill(s) ; Ordering Provider:   Arben FITZGERALD [place]; Catalog Code:   calcitonin ; Order Dt/Tm:   12/11/2020 2:58:34 PM CST          clopidogrel  :   clopidogrel ; Status:   Prescribed ; Ordered As Mnemonic:   clopidogrel 75 mg oral tablet ; Simple Display Line:   1 tab(s), Oral, daily, 30 tab(s), 1 Refill(s) ; Ordering Provider:   Alexus Conti MD; Catalog Code:   clopidogrel ; Order Dt/Tm:   12/15/2020 10:48:27 PM CST          rosuvastatin  :   rosuvastatin ; Status:   Prescribed ; Ordered As Mnemonic:   rosuvastatin 10 mg oral tablet ; Simple Display Line:   1 tab(s), Oral, qhs, 30 tab(s), 1 Refill(s) ; Ordering Provider:   Alexus Conti MD; Catalog Code:   rosuvastatin ; Order Dt/Tm:   12/15/2020 10:48:59 PM CST          triamcinolone topical  :   triamcinolone topical ; Status:   Prescribed ; Ordered As Mnemonic:   triamcinolone 0.1% topical  ointment ; Simple Display Line:   See Instructions, APPLY TO AFFECTED AREA 3 TIMES A DAY, 30 gm, 1 Refill(s) ; Ordering Provider:   Skip Theodore MD; Catalog Code:   triamcinolone topical ; Order Dt/Tm:   1/10/2021 6:53:01 PM CST            Home Meds    calcium-vitamin D  :   calcium-vitamin D ; Status:   Documented ; Ordered As Mnemonic:   Calcium 500+D ; Simple Display Line:   1 tab(s), chewed, bid, 0 Refill(s) ; Catalog Code:   calcium-vitamin D ; Order Dt/Tm:   5/5/2016 10:55:40 AM CDT          multivitamin with minerals  :   multivitamin with minerals ; Status:   Documented ; Ordered As Mnemonic:   multivitamin with minerals (w/ Iron) ; Simple Display Line:   po, bid, 0 Refill(s) ; Catalog Code:   multivitamin with minerals ; Order Dt/Tm:   5/5/2016 10:55:57 AM CDT          prednisoLONE ophthalmic  :   prednisoLONE ophthalmic ; Status:   Documented ; Ordered As Mnemonic:   Prednisol 1% ophthalmic solution ; Simple Display Line:   1 drop(s), left eye, daily, mL, PRN: for arthritis ; Catalog Code:   prednisoLONE ophthalmic ; Order Dt/Tm:   8/30/2011 4:15:01 PM CDT            Social History   Social History   (As Of: 3/1/2021 12:25:45 PM CST)   Alcohol:  Current      Beer (12 oz), Wine (5 oz), 1-2 times per year, 2 drinks/episode average.   Comments:  1/29/2015 9:40 AM - Kimberly White LPN: Infrequent alcohol use per pt.   (Last Updated: 3/10/2020 1:50:46 PM CDT by [place])          Tobacco:        Former smoker, quit more than 30 days ago   (Last Updated: 12/11/2020 2:40:13 PM CST by Ann-Marie Willis)          Electronic Cigarette/Vaping:        Electronic Cigarette Use: Never.   (Last Updated: 12/11/2020 2:40:18 PM CST by Ann-Marie Willis)          Substance Abuse:  Past      Marijuana, In college   (Last Updated: 1/16/2019 2:30:23 PM CST by [place])          Employment/School:        Part time, Work/School description: Interior design.  Highest education level: 3 yrs college.   (Last  Updated: 3/10/2020 1:49:51 PM CDT by [place] C[place]o)          Home/Environment:        Marital status: .  Spouse/Partner name: Brandon.   (Last Updated: 1/16/2019 2:28:11 PM CST by [place] C[place]o)          Nutrition/Health:        Type of diet: Plant based.  Wants to lose weight: Yes.  Sleeping concerns: Yes.  Feels highly stressed: No.   (Last Updated: 3/10/2020 1:51:15 PM CDT by [place]o)          Exercise:  Regular exercise      Exercise frequency: Daily.  Exercise type: Walking, Dancing.   (Last Updated: 3/10/2020 1:51:30 PM CDT by [place] C[place]o)          Sexual:        Sexually active: Yes.  Sexual orientation: Straight or heterosexual.   (Last Updated: 1/16/2019 2:30:58 PM CST by [place] C[place]o)          Other:        First menses age 12.  Menstrual duration 5 days.  Cycle interval 25-30 days.  No abnormal Pap smear.   (Last Updated: 1/16/2019 2:28:45 PM CST by [place] C[place]o)            Geriatric Depression Screening   Geriatric Depression Satisfied Life :   Yes   Geriatric Depression Dropped Activities :   Yes   Geriatric Depression Bored :   Yes   Geriatric Depression Good Spirits :   Yes   Geriatric Depression Afraid Bad Things :   No   Geriatric Depression Feel Happy :   Yes   Geriatric Depression Feel Helpless :   No   Geriatric Depression Prefer to Stay Home :   No   Geriatric Depression Memory Problems :   No   Geriatric Depression Wonderful Be Alive :   Yes   Geriatric Depression Feel Worthless :   Yes   Geriatric Depression Situation Hopeless :   No   Geriatric Depression Others Better Off :   No   Geriatric Depression Full of Energy :   Yes   Erica Levine CMA - 3/1/2021 1:16 PM CST   Hearing and Vision Screening   Audiogram Result Right Ear :   Pass   Audiogram Result Left Ear :   Pass   Mildred Bullard MA - 3/1/2021 12:17 PM CST   Home Safety Screen   Firearms Unloaded and Secure :   Yes   Stairway Rails or Banisters :   Yes   Erica Levine CMA - 3/1/2021 1:16 PM CST   Emergency Numbers  Kept/Updated :   No   Aware of Smoking Dangers :   Yes   Smoke Alarms/Fire Extinguisher Available :   Yes   Household Members Fire Safety Knowledge :   Yes   Floor Rugs Removed or Fastened :   Yes   Mats in Bathtub/Shower :   Yes   Outdoor Clutter Safety :   Yes   Indoor Clutter Safety :   Yes   Electrical Cord Safety :   Yes   Mildred Bullard MA 3/1/2021 12:17 PM CST   Advance Directives   Advance Directive :   No   Mildred Bullard MA 3/1/2021 12:17 PM CST   Grewal Fall Risk   History of Fall in Last 3 Months Grewal :   No   Mildred Bullard MA 3/1/2021 12:17 PM CST   Functional Assessment   ADL Index Score :   12    Erica Levine CMA 3/1/2021 1:16 PM CST   Focused Functional Assessment Grid   Continence :   Independent (2)   Erica Levine CMA 3/1/2021 1:16 PM CST   Bathing :   Independent (2)   Dressing :   Independent (2)   Toileting :   Independent (2)   Transferring Bed or Chair :   Independent (2)   Feeding :   Independent (2)   Capable of Shopping :   Yes   Capable of Walking :   Yes   Capable of Housekeeping :   Yes   Capable of Managing Medications :   Yes   Capable of Handling Finances :   Yes   Mildred Bullard MA 3/1/2021 12:17 PM CST

## 2022-02-16 NOTE — NURSING NOTE
CAGE Assessment Entered On:  3/1/2021 12:17 PM CST    Performed On:  3/1/2021 12:17 PM CST by Mildred Bullard MA               Assessment   Have you ever felt you should cut down on your drinking :   No   Have people annoyed you by criticizing your drinking :   No   Have you ever felt bad or guilty about your drinking :   No   Have you ever taken a drink first thing in the morning to steady your nerves or get rid of a hangover (Eye-opener) :   No   CAGE Score :   0    Mildred Bullard MA - 3/1/2021 12:17 PM CST

## 2022-02-16 NOTE — TELEPHONE ENCOUNTER
Entered by Macho Jama PA-C on April 27, 2021 7:15:23 AM CDT  ---------------------  From: Macho Jama PA-C   To: [facility] #[id number]    Sent: 4/27/2021 7:15:23 AM CDT  Subject: Medication Management     ** Submitted: **  Complete:rosuvastatin (rosuvastatin 10 mg oral tablet)   Signed by Macho Jama PA-C  4/27/2021 12:15:00 PM [facility]    ** Submitted: **  Complete:clopidogrel (clopidogrel 75 mg oral tablet)   Signed by Macho Jama PA-C  4/27/2021 12:15:00 PM [facility]    ** Approved **  clopidogrel (CLOPIDOGREL 75MG TABLETS)  TAKE 1 TABLET BY MOUTH DAILY  Qty:  30 tab(s)        Days Supply:  30        Refills:  0          Substitutions Allowed     Route To [id number]   Signed by Macho Jama PA-C    ** Approved **  rosuvastatin (ROSUVASTATIN 10MG TABLETS)  TAKE 1 TABLET BY MOUTH AT BEDTIME  Qty:  30 tab(s)        Days Supply:  30        Refills:  0          Substitutions Allowed     Route To [id number]   Signed by Macho Jama PA-C            ------------------------------------------  From: [id number]  To: kSip Theodore MD  Sent: April 27, 2021 3:36:15 AM CDT  Subject: Medication Management  Due: April 27, 2021 12:41:43 AM CDT     ** On Hold Pending Signature **     Dispensed Drug: clopidogrel (clopidogrel 75 mg oral tablet), TAKE 1 TABLET BY MOUTH DAILY  Quantity: 30 tab(s)  Days Supply: 30  Refills: 0  Substitutions Allowed  Notes from Pharmacy:     ** On Hold Pending Signature **     Dispensed Drug: rosuvastatin (rosuvastatin 10 mg oral tablet), TAKE 1 TABLET BY MOUTH AT BEDTIME  Quantity: 30 tab(s)  Days Supply: 30  Refills: 0  Substitutions Allowed  Notes from Pharmacy:  ------------------------------------------

## 2022-02-16 NOTE — PROGRESS NOTES
Patient:   IRENE BURGESS            MRN: [id number]            FIN: [id number]               Age:   67 years     Sex:  Female     :  1950   Associated Diagnoses:   Embedded foreign body   Author:   Alexus Conti MD      Chief Complaint   2017 1:36 PM CST    Patient here for bug's head in left upper thigh.      History of Present Illness             The patient presents with skin lesion(s).        Interval History   Patient here with concerns about bite to left upper thigh. Patient had noticed irritation for the past two days, then today, pulled what appeared to be part of a winged insect. Did not get all of it out.       Health Status   Allergies:    Allergic Reactions (All)  Severity Not Documented  Lipitor (Myalgias)  Lisinopril (Cough)   Medications:  (Selected)   Prescriptions  Prescribed  calcitonin 200 intl units/inh nasal spray: 1 spray(s), nasal, daily, # 3 EA, 3 Refill(s), Type: Maintenance, Pharmacy: [facility], 1 spray(s) nasal daily  clopidogrel 75 mg oral tablet: 1 tab(s) ( 75 mg ), po, daily, # 30 tab(s), 11 Refill(s), Type: Maintenance, Pharmacy: [id number], Needs to be seen for more refills., 1 tab(s) po daily  Documented Medications  Documented  Calcium 500+D: 1 tab(s), chewed, bid, 0 Refill(s), Type: Maintenance  Prednisol 1% ophthalmic solution: 1 drop(s), left eye, daily, PRN: for arthritis, mL, 0 Refill(s), Type: Maintenance  multivitamin with minerals (w/ Iron): po, bid, 0 Refill(s), Type: Maintenance   Problem list:    All Problems (Selected)  Allergic Rhinitis / ICD-9-.9 / Confirmed  Anticardiolipin Antibody Positive / ICD-9-.79 / Confirmed  Colon cancer screening / SNOMED CT 780981512 / Confirmed  Familial Hypercholesteremia / SNOMED CT 981088832 / Confirmed  Glaucoma, Narrow-Angle / ICD-9-.20 / Confirmed  Osteoporosis / ICD-9-.00 / Confirmed      Histories   Past Medical History:    Active  Anticardiolipin Antibody Positive (ICD-9-CM  795.79): Onset in  at 51 years.  Osteoporosis (ICD-9-.00)  Allergic Rhinitis (ICD-9-.9)  Glaucoma, Narrow-Angle (ICD-9-.20)  Resolved  Tobacco abuse (ICD-9-.1):  Resolved.   Family History:    Peptic ulcer disease  Father  Grandfather (P)  Grandmother (P)  CA - Lung cancer  Mother ()  Cerebrovascular accident  Father  Antiphospholipid syndrome  Sister  Stroke  Father  Kidney disease  Mother ()  Grandmother (M)  Grandfather (M)  Tobacco abuse  Mother ()  Depression  Brother  Son  Brother  Endometriosis  Sister     Social History:        Alcohol Assessment: Current            0 drinks/episode average.  1 drinks/episode maximum.                     Comments:                      2015 - Kimberly White LPN                     Infrequent alcohol use per pt.      Tobacco Assessment: Past            Past                     Comments:                      2010 - Carmina Spears                     Quit in       Employment and Education Assessment            Employed, Work/School description: .      Exercise and Physical Activity Assessment: Regular exercise            Exercise frequency: 5-6 times/week.  Exercise type: Walking.      Other Assessment            Marital Status,         Physical Examination   Vital Signs   2017 1:36 PM CST Peripheral Pulse Rate 96 bpm    Systolic Blood Pressure 128 mmHg    Diastolic Blood Pressure 76 mmHg    Mean Arterial Pressure 93 mmHg    Oxygen Saturation 97 %      Measurements from flowsheet : Measurements   2017 1:36 PM CST Height Measured - Standard 62 in    Weight Measured - Standard 161 lb    BSA 1.79 m2    Body Mass Index 29.44 kg/m2      General:  Alert and oriented, No acute distress.    Musculoskeletal:  left upper thigh with bruised area with erythematous indurated 1cm wide area with central area of necrosis.       Procedure   Biopsy procedure   Performed by: self.     Informed  consent: signed by patient.     Indication: abnormal physical findings.     Preparation and technique: skin prepped in usual sterile fashion, sterile preparation of site in usual fashion, local anesthesia 1% lidocaine with epinephrine, technique (punch biopsy, 5mm), hemostasis achieved using sutures.     Completion: estimated blood loss minimal.     Procedure tolerated: well.     No Complications.        Impression and Plan   Diagnosis     Embedded foreign body (CPX04-RI Z18.9).     Course:  Not improving.    Plan:  will cover with doxycycline given possible insect exposure.    Orders     Orders (Selected)   Outpatient Orders  Ordered (In Transit)  Tissue pathology* ([facility]): Specimen Type: Miscellaneous Specimen, Collection Date: 12/08/17 14:23:00 CST  Prescriptions  Prescribed  doxycycline monohydrate 100 mg oral tablet: 1 tab(s) ( 100 mg ), PO, bid, # 20 tab(s), 0 Refill(s), Type: Maintenance, Pharmacy: [facility] [id number] [facility], 1 tab(s) po bid,x10 day(s).

## 2022-02-16 NOTE — NURSING NOTE
CAGE Assessment Entered On:  1/9/2019 11:18 AM CST    Performed On:  1/9/2019 11:18 AM CST by Meredith Stanton CMA               Assessment   Have you ever felt you should cut down on your drinking :   No   Have people annoyed you by criticizing your drinking :   No   Have you ever felt bad or guilty about your drinking :   No   Have you ever taken a drink first thing in the morning to steady your nerves or get rid of a hangover (Eye-opener) :   No   CAGE Score :   0    Meredith Stanton CMA - 1/9/2019 11:18 AM CST

## 2022-02-16 NOTE — NURSING NOTE
Comprehensive Intake Entered On:  10/18/2021 2:33 PM CDT    Performed On:  10/18/2021 2:28 PM CDT by Gardenia Gonzáles CMA               Summary   Chief Complaint :   c/o chills, body aches,post nasal drip,sinus pressure since Saturday night   Advance Directive :   No   Menstrual Status :   Hysterectomy   Weight Measured :   148 lb(Converted to: 148 lb 0 oz, 67.132 kg)    Height Measured :   62 in(Converted to: 5 ft 2 in, 157.48 cm)    Body Mass Index :   27.07 kg/m2 (HI)    Body Surface Area :   1.71 m2   Height/Length Estimated :   62 in(Converted to: 5 ft 2 in, 157.48 cm)    Systolic Blood Pressure :   132 mmHg (HI)    Diastolic Blood Pressure :   82 mmHg (HI)    Mean Arterial Pressure :   99 mmHg   Peripheral Pulse Rate :   66 bpm   BP Site :   Right arm   BP Method :   Electronic   HR Method :   Electronic   Temperature Tympanic :   97.0 DegF(Converted to: 36.1 DegC)  (LOW)    Gardenia Gonzáles CMA - 10/18/2021 2:28 PM CDT   Health Status   Allergies Verified? :   Yes   Medication History Verified? :   Yes   Immunizations Current :   Yes   Medical History Verified? :   Yes   Tobacco Use? :   Former smoker   Gardenia Gonzáles CMA - 10/18/2021 2:28 PM CDT   Consents   Consent for Immunization Exchange :   Consent Granted   Consent for Immunizations to Providers :   Consent Granted   Gardenia Gonzáles CMA - 10/18/2021 2:28 PM CDT   Meds / Allergies   (As Of: 10/18/2021 2:33:45 PM CDT)   Allergies (Active)   Lipitor  Estimated Onset Date:   Unspecified ; Reactions:   Myalgias ; Created By:   Lesley Jacobo; Reaction Status:   Active ; Substance:   Lipitor ; Updated By:   Lesley Jacobo; Reviewed Date:   10/18/2021 2:31 PM CDT      lisinopril  Estimated Onset Date:   Unspecified ; Reactions:   Cough ; Created By:   Lesley Jacobo; Reaction Status:   Active ; Category:   Drug ; Substance:   lisinopril ; Type:   Allergy ; Updated By:   Lesley Jacobo; Reviewed Date:   10/18/2021 2:31 PM CDT        Medication List   (As Of:  10/18/2021 2:33:45 PM CDT)   Prescription/Discharge Order    triamcinolone topical  :   triamcinolone topical ; Status:   Prescribed ; Ordered As Mnemonic:   triamcinolone 0.1% topical ointment ; Simple Display Line:   1 jorge a, Topical, tid, 60 gm, 2 Refill(s) ; Ordering Provider:   Alexus Conti MD; Catalog Code:   triamcinolone topical ; Order Dt/Tm:   3/1/2021 1:02:14 PM CST          rosuvastatin  :   rosuvastatin ; Status:   Prescribed ; Ordered As Mnemonic:   rosuvastatin 10 mg oral tablet ; Simple Display Line:   1 tab(s), Oral, qhs, 30 tab(s), 0 Refill(s) ; Ordering Provider:   Skip Theodore MD; Catalog Code:   rosuvastatin ; Order Dt/Tm:   4/27/2021 7:15:12 AM CDT          clopidogrel  :   clopidogrel ; Status:   Prescribed ; Ordered As Mnemonic:   clopidogrel 75 mg oral tablet ; Simple Display Line:   1 tab(s), Oral, daily, 30 tab(s), 0 Refill(s) ; Ordering Provider:   Skip Theodore MD; Catalog Code:   clopidogrel ; Order Dt/Tm:   4/27/2021 7:15:12 AM CDT          calcitonin  :   calcitonin ; Status:   Prescribed ; Ordered As Mnemonic:   calcitonin 200 intl units/inh nasal spray ; Simple Display Line:   1 spray(s), Nasal, daily, 3 EA, 3 Refill(s) ; Ordering Provider:   Alexus Conti MD; Catalog Code:   calcitonin ; Order Dt/Tm:   3/1/2021 1:03:06 PM CST            Home Meds    multivitamin with minerals  :   multivitamin with minerals ; Status:   Documented ; Ordered As Mnemonic:   multivitamin with minerals (w/ Iron) ; Simple Display Line:   po, bid, 0 Refill(s) ; Catalog Code:   multivitamin with minerals ; Order Dt/Tm:   5/5/2016 10:55:57 AM CDT          calcium-vitamin D  :   calcium-vitamin D ; Status:   Documented ; Ordered As Mnemonic:   Calcium 500+D ; Simple Display Line:   1 tab(s), chewed, bid, 0 Refill(s) ; Catalog Code:   calcium-vitamin D ; Order Dt/Tm:   5/5/2016 10:55:40 AM CDT          prednisoLONE ophthalmic  :   prednisoLONE ophthalmic ; Status:   Documented ; Ordered  As Mnemonic:   Prednisol 1% ophthalmic solution ; Simple Display Line:   1 drop(s), left eye, daily, mL, PRN: for arthritis ; Catalog Code:   prednisoLONE ophthalmic ; Order Dt/Tm:   8/30/2011 4:15:01 PM CDT            Social History   Social History   (As Of: 10/18/2021 2:33:45 PM CDT)   Alcohol:  Current      Beer (12 oz), Wine (5 oz), 1-2 times per year, 2 drinks/episode average.   Comments:  1/29/2015 9:40 AM - Kimberly White LPN: Infrequent alcohol use per pt.   (Last Updated: 3/10/2020 1:50:46 PM CDT by La Crespo)          Tobacco:        Former smoker, quit more than 30 days ago   (Last Updated: 12/11/2020 2:40:13 PM CST by Ann-Marie Willis)          Electronic Cigarette/Vaping:        Electronic Cigarette Use: Never.   (Last Updated: 12/11/2020 2:40:18 PM CST by Ann-Marie Willis)          Substance Abuse:  Past      Marijuana, In college   (Last Updated: 1/16/2019 2:30:23 PM CST by La Crespo)          Employment/School:        Part time, Work/School description: Interior design.  Highest education level: 3 yrs college.   (Last Updated: 3/10/2020 1:49:51 PM CDT by La Crespo)          Home/Environment:        Marital status: .  Spouse/Partner name: Brandon.   (Last Updated: 1/16/2019 2:28:11 PM CST by La Crespo)          Nutrition/Health:        Type of diet: Plant based.  Wants to lose weight: Yes.  Sleeping concerns: Yes.  Feels highly stressed: No.   (Last Updated: 3/10/2020 1:51:15 PM CDT by La Crespo)          Exercise:  Regular exercise      Exercise frequency: Daily.  Exercise type: Walking, Dancing.   (Last Updated: 3/10/2020 1:51:30 PM CDT by La Crespo)          Sexual:        Sexually active: Yes.  Sexual orientation: Straight or heterosexual.   (Last Updated: 1/16/2019 2:30:58 PM CST by La Crespo)          Other:        First menses age 12.  Menstrual duration 5 days.  Cycle interval 25-30 days.  No abnormal Pap smear.   (Last Updated: 1/16/2019 2:28:45 PM CST by  Cherie , La)

## 2022-02-16 NOTE — TELEPHONE ENCOUNTER
Entered by Mary Valverde MA on February 28, 2020 4:25:48 PM CST  ---------------------  From: Mary Valverde MA   To: [facility] [id number] [facility]    Sent: 2/28/2020 4:25:48 PM CST  Subject: Medication Management     ** Not Approved: Refill not appropriate **  calcitonin (CALCITONIN-SALMON 200 UNITS SP)  PLACE 1 SPRAY IN EACH NOSTRIL ALTERNATING NOSTRILS EVERY DAY  Qty:  3.7 spray(s)        Days Supply:  30        Refills:  12          Substitutions Allowed     Route To Pharmacy - [place] [facility] [id number] [facility]   Signed by Mary Valverde MA            ------------------------------------------  From: [facility] [id number] [facility]  To: Alexus Conti MD  Sent: February 27, 2020 12:28:35 PM CST  Subject: Medication Management  Due: February 28, 2020 12:28:35 PM CST    ** On Hold Pending Signature **  Drug: calcitonin (calcitonin 200 intl units/inh nasal spray)  PLACE 1 SPRAY IN EACH NOSTRIL ALTERNATING NOSTRILS EVERY DAY  Quantity: 3.7 spray(s)  Days Supply: 30  Refills: 12  Substitutions Allowed  Notes from Pharmacy:     Dispensed Drug: calcitonin (calcitonin 200 intl units/inh nasal spray)  PLACE 1 SPRAY IN EACH NOSTRIL ALTERNATING NOSTRILS EVERY DAY  Quantity: 3.7 spray(s)  Days Supply: 30  Refills: 12  Substitutions Allowed  Notes from Pharmacy:   ------------------------------------------

## 2022-02-16 NOTE — PROGRESS NOTES
Patient:   IRENE BURGESS            MRN: [id number]            FIN: [id number]               Age:   70 years     Sex:  Female     :  1950   Associated Diagnoses:   Medicare annual wellness visit, subsequent; Familial Hypercholesteremia; Anticardiolipin syndrome; Fatigue; Contact dermatitis   Author:   Alexus Conti MD      Visit Information      Primary Care Provider (PCP):  Skip Theodore MD    NPI# [id number]      Chief Complaint   3/1/2021 12:17 PM CST    AWV   Here for annual wellness physical for Medicare      History of Present Illness     Current medical providers reviewed with patient updated on demographics in chart as listed on the patient health history form.  Depression screening completed and history reviewed with patient, no concerns.   CAGE reviewed with patient, no concerns.  Functional ability/Health Risk assessment reviewed:   Hearing impairment - denies concerns   Activities of daily living - independent without concerns   Fall risks - denies falls in past year, no assistance required with walking or transfer   Home safety issues reviewed, no concerns raised.  Cognitive impairment evaluated, patient oriented to year, date, location, self.  No deficits notes.  Cognitive screening and dementia symptoms reviewed.  Advanced care planning discussed.  Patient is welcome to provide paperwork to us so that we can copy into electronic medical record.  Preventive services reviewed and documented on preventive services checklist.  Patient with history of anticardiolipin antibody syndrome, has been on plavix since diagnosis  Also discussed blood pressure, has had intermittent elevated BP at home, not checking regularly  Tolerating statin, due for lipid panel   patient has ongoing rash on hands, triamcinolone is somewhat helpful but can get very itchy      Review of Systems   ROS reviewed as documented in chart      Health Status   Allergies:    Allergic Reactions (Selected)  Severity Not  Documented  Lipitor (Myalgias)  Lisinopril (Cough)   Medications:  (Selected)   Prescriptions  Prescribed  calcitonin 200 intl units/inh nasal spray: = 1 spray(s), Nasal, daily, # 1 EA, 2 Refill(s), Type: Maintenance, Pharmacy: [id number], Patient will start in 2021, 1 spray(s) Nasal daily, 62, in, 02/26/20 11:28:00 CST, Height Measured, 158, lb, 12/11/20 14:38:00 CST, Weight Measured...  clopidogrel 75 mg oral tablet: = 1 tab(s), Oral, daily, # 30 tab(s), 1 Refill(s), Type: Maintenance, Pharmacy: [facility], 1 tab(s) Oral daily, 62, in, 02/26/20 11:28:00 CST, Height Measured, 158, lb, 12/11/20 14:38:00 CST, Weight Measured  rosuvastatin 10 mg oral tablet: = 1 tab(s), Oral, qhs, # 30 tab(s), 1 Refill(s), Type: Maintenance, Pharmacy: [facility], 1 tab(s) Oral qhs, 62, in, 02/26/20 11:28:00 CST, Height Measured, 158, lb, 12/11/20 14:38:00 CST, Weight Measured  triamcinolone 0.1% topical ointment: See Instructions, Instructions: APPLY TO AFFECTED AREA 3 TIMES A DAY, # 30 gm, 1 Refill(s), Type: Acute, Pharm[place] [facility] [id number] [facility], APPLY TO AFFECTED AREA 3 TIMES A DAY, 62, in, 02/26/20 11:28:00 CST, Height Measured, 158, lb, 12/11/20 14:38...  Documented Medications  Documented  Calcium 500+D: 1 tab(s), chewed, bid, 0 Refill(s), Type: Maintenance  Prednisol 1% ophthalmic solution: 1 drop(s), left eye, daily, PRN: for arthritis, mL, 0 Refill(s), Type: Maintenance  multivitamin with minerals (w/ Iron): po, bid, 0 Refill(s), Type: Maintenance   Problem list:    All Problems  Unspecified primary angle-closure glaucoma, stage unspecified / SNOMED CT 4373938524 / Confirmed  Osteopenia / SNOMED CT 600808460 / Confirmed  Familial Hypercholesteremia / SNOMED CT 803342943 / Confirmed  Colon cancer screening / SNOMED CT 659752753 / Confirmed  Allergic rhinitis, unspecified / SNOMED CT 292121378 / Confirmed  Abnormal immunological finding in serum, unspecified / SNOMED CT 1666908457 /  Confirmed  Inactive: Age-related osteoporosis without current pathological fracture / SNOMED CT 017416974  Resolved: Tobacco abuse / ICD-9-.1  Resolved: Pregnancy / SNOMED CT 520473876  Resolved: Pregnancy / SNOMED CT 763201081      Histories   Past Medical History:    Active  Abnormal immunological finding in serum, unspecified (SNOMED CT 7545280814): Onset in  at 51 years.  Allergic rhinitis, unspecified (SNOMED CT 706992851)  Unspecified primary angle-closure glaucoma, stage unspecified (SNOMED CT 7762504236)  Familial Hypercholesteremia (SNOMED CT 463821271)  Osteopenia (SNOMED CT 604777407)  Resolved  Pregnancy (SNOMED CT 372106492): Onset on 1977 at 27 years.  Resolved in  at 27 years.  Pregnancy (SNOMED CT 166964121): Onset on 1973 at 23 years.  Resolved in  at 23 years.  Tobacco abuse (ICD-9-.1):  Resolved.   Family History:    Peptic ulcer disease  Father  Grandfather (P)  Grandmother (P)  CA - Lung cancer  Mother ()  Cerebrovascular accident  Father  Antiphospholipid syndrome  Sister  Stroke  Father  Kidney disease  Mother ()  Grandmother (M)  Grandfather (M)  Tobacco abuse  Mother ()  Depression  Brother  Son (Jet)  Brother  Endometriosis  Sister     Procedure history:    Colonoscopy (347271292) on 2016 at 66 Years.  Comments:  2016 8:32 AM ELIZABETHT - La Crespo  Indication:  Heme positive stool.  Sedation:  Midazolam 3 mg IV, Fentanyl 150 mcg IV.  Impression:  Internal hemorrhoids that prolapse with straining, but require manual replacement into the anal canal (Grade III) found on perianal exam.  Significant looping of the colon.  Normal colon.  Hemorrhoids.  Oophorectomy (324485030) on 1/3/2007 at 56 Years.  Resection (586605656) on 1/3/2007 at 56 Years.  Comments:  2010 1:09 PM Carmina Thibodeaux  of vaginal septum  Hysterectomy (326426502) in the month of 2007 at 56 Years.  Comments:  2010 1:05 PM BONITA Spears  Carmina  laparoscopic-assisted vaginal  Hysteroscopy (822933667) in 2006 at 56 Years.  Bunionectomy (19736082) on 2000 at 50 Years.  Osteotomy (566708135) on 2000 at 50 Years.  Tonsillectomy and adenoidectomy (878818225).  Appendectomy (088297652).   section (91253705).   section (83423599).  Dilation and curettage (47438205).   Social History:        Electronic Cigarette/Vaping Assessment            Electronic Cigarette Use: Never.      Alcohol Assessment: Current            Beer (12 oz), Wine (5 oz), 1-2 times per year, 2 drinks/episode average.                     Comments:                      2015 - Kimberly White LPN                     Infrequent alcohol use per pt.      Tobacco Assessment            Former smoker, quit more than 30 days ago      Substance Abuse Assessment: Past            Marijuana, In college      Employment and Education Assessment            Part time, Work/School description: Interior design.  Highest education level: 3 yrs college.      Home and Environment Assessment            Marital status: .  Spouse/Partner name: Brandon.      Nutrition and Health Assessment            Type of diet: Plant based.  Wants to lose weight: Yes.  Sleeping concerns: Yes.  Feels highly stressed: No.      Exercise and Physical Activity Assessment: Regular exercise            Exercise frequency: Daily.  Exercise type: Walking, Dancing.      Sexual Assessment            Sexually active: Yes.  Sexual orientation: Straight or heterosexual.      Other Assessment            First menses age 12.  Menstrual duration 5 days.  Cycle interval 25-30 days.  No abnormal Pap smear.        Physical Examination   Vital Signs   3/1/2021 1:11 PM CST Systolic Blood Pressure 154 mmHg  HI    Diastolic Blood Pressure 72 mmHg    Mean Arterial Pressure 99 mmHg    BP Site Right arm    BP Method Manual   3/1/2021 12:17 PM CST Temperature Tympanic 96.8 DegF  LOW    Peripheral Pulse Rate 77 bpm    Pulse  Site Radial artery    HR Method Manual    Systolic Blood Pressure 160 mmHg  HI    Diastolic Blood Pressure 90 mmHg  HI    Mean Arterial Pressure 113 mmHg    BP Site Right arm    BP Method Manual    Oxygen Saturation 97 %      Measurements from flowsheet : Measurements   3/1/2021 12:17 PM CST Height Measured - Standard 62 in    Height/Length Estimated 62 in    Weight Measured - Standard 158 lb    BSA 1.77 m2    Body Mass Index 28.9 kg/m2  HI      General:  Alert and oriented, No acute distress.    Eye:  Pupils are equal, round and reactive to light, Extraocular movements are intact, Normal conjunctiva.    HENT:  Normocephalic, Tympanic membranes are clear, Oral mucosa is moist, No pharyngeal erythema.    Neck:  Supple, Non-tender, No lymphadenopathy, No thyromegaly.    Respiratory:  Lungs are clear to auscultation.    Cardiovascular:  Normal rate, Regular rhythm.    Breast:  No mass, No tenderness, No discharge.    Gastrointestinal:  Soft, Non-tender, Non-distended, No organomegaly.    Musculoskeletal:  Normal range of motion, Normal strength.    Integumentary:  Warm, Dry, Pink, No rash, no concerning lesions.    Neurologic:  Alert, Oriented, Normal sensory.    Psychiatric:  Cooperative, Appropriate mood & affect.       Impression and Plan   Diagnosis     Medicare annual wellness visit, subsequent (FUR95-KI Z00.00).     Course:  Progressing as expected.    Patient Instructions:       Counseled: Patient, Regarding diagnosis, Regarding treatment, Regarding medications, Diet, Activity, Prognosis/ end-of-life issues, BMI, exercise, healthy eating, home safety, depression.    Summary:  Preventive services recommended as noted on preventive services checklist..    Diagnosis     Familial Hypercholesteremia (EBQ19-PT E78.01).     Anticardiolipin syndrome (THR64-XA D68.61).     Orders     Orders (Selected)   Outpatient Orders  Ordered (In Transit)  Comprehensive Metabolic Panel* ([facility]): Specimen Type: Serum, Collection Date:  03/01/21 12:58:00 CST  Lipid panel with reflex to direct ldl* ([facility]): Specimen Type: Serum, Collection Date: 03/01/21 12:58:00 CST  Prescriptions  Prescribed  clopidogrel 75 mg oral tablet: = 1 tab(s), Oral, daily, # 90 tab(s), 3 Refill(s), Type: Maintenance, Pharmacy: [email or web address] #[id number], 1 tab(s) Oral daily, 62, in, 03/01/21 12:17:00 CST, Height Measured, 158, lb, 03/01/21 12:17:00 CST, Weight Measured  rosuvastatin 10 mg oral tablet: = 1 tab(s), Oral, qhs, # 90 tab(s), 3 Refill(s), Type: Maintenance, Pharmacy: [facility] #[id number], 1 tab(s) Oral qhs, 62, in, 03/01/21 12:17:00 CST, Height Measured, 158, lb, 03/01/21 12:17:00 CST, Weight Measured.     Diagnosis     Fatigue (BVT94-BG R53.83).     Orders     will check CBC today with labs.     Diagnosis     Contact dermatitis (WJE05-UA L25.9).     Course:  rash on hands is likely allergic/reactive. OK to continue triamcinolone cream. Trial OTC loratadine..

## 2022-02-16 NOTE — PROGRESS NOTES
Patient:   IRENE BURGESS            MRN: [id number]            FIN: [id number]               Age:   70 years     Sex:  Female     :  1950   Associated Diagnoses:   Contact dermatitis   Author:   Skip Theodore MD      Visit Information      Date of Service: 2020 02:30 pm  Performing Location: [facility]  Encounter#: [id number]      Primary Care Provider (PCP):  Skip Theodore MD    NPI# [id number]      Referring Provider:  Skip Theodore MD    NPI# [id number]      Chief Complaint   2020 2:38 PM CST   Rash on hands since August.     Chief complaint and symptoms noted above confirmed with patient.      History of Present Illness             The patient presents with rash.  The location of the rash is bilaterally on the, hand.  The rash is described as red, dry and scaly.  Exacerbating factors consist of none.  Relieving factors consist of none.        Review of Systems   Constitutional:  Negative.    Integumentary:  Negative except as documented in history of present illness.       Health Status   Allergies:    Allergic Reactions (Selected)  Severity Not Documented  Lipitor (Myalgias)  Lisinopril (Cough)   Medications:  (Selected)   Prescriptions  Prescribed  calcitonin 200 intl units/inh nasal spray: = 1 spray(s), Nasal, daily, # 1 EA, 11 Refill(s), Type: Maintenance, Pharmacy: [facility]  clopidogrel 75 mg oral tablet: = 1 tab(s) ( 75 mg ), Oral, daily, # 30 tab(s), 11 Refill(s), Type: Maintenance, Pharmacy: [facility], 1 tab(s) Oral daily  rosuvastatin 10 mg oral tablet: = 1 tab(s) ( 10 mg ), Oral, hs, # 30 tab(s), 11 Refill(s), Type: Maintenance, Pharmacy: [facility], 1 tab(s) Oral hs  Documented Medications  Documented  Calcium 500+D: 1 tab(s), chewed, bid, 0 Refill(s), Type: Maintenance  Prednisol 1% ophthalmic solution: 1 drop(s), left eye, daily, PRN: for arthritis, mL, 0 Refill(s), Type: Maintenance  multivitamin with minerals (w/  Iron): po, bid, 0 Refill(s), Type: Maintenance   Problem list:    All Problems (Selected)  Allergic rhinitis, unspecified / SNOMED CT 868551063 / Confirmed  Unspecified primary angle-closure glaucoma, stage unspecified / SNOMED CT 7165661154 / Confirmed  Abnormal immunological finding in serum, unspecified / SNOMED CT 2116997595 / Confirmed  Colon cancer screening / SNOMED CT 049476461 / Confirmed  Familial Hypercholesteremia / SNOMED CT 518106051 / Confirmed  Osteopenia / SNOMED CT 786924221 / Confirmed      Histories   Past Medical History:    Active  Abnormal immunological finding in serum, unspecified (SNOMED CT 3970261543): Onset in  at 51 years.  Allergic rhinitis, unspecified (SNOMED CT 636993310)  Unspecified primary angle-closure glaucoma, stage unspecified (SNOMED CT 0130361740)  Familial Hypercholesteremia (SNOMED CT 631471071)  Osteopenia (SNOMED CT 286884253)  Resolved  Pregnancy (SNOMED CT 487088807): Onset on 1977 at 27 years.  Resolved in  at 27 years.  Pregnancy (SNOMED CT 031094380): Onset on 1973 at 23 years.  Resolved in  at 23 years.  Tobacco abuse (ICD-9-.1):  Resolved.   Family History:    Peptic ulcer disease  Father  Grandfather (P)  Grandmother (P)  CA - Lung cancer  Mother ()  Cerebrovascular accident  Father  Antiphospholipid syndrome  Sister  Stroke  Father  Kidney disease  Mother ()  Grandmother (M)  Grandfather (M)  Tobacco abuse  Mother ()  Depression  Brother  Son (Jet)  Brother  Endometriosis  Sister     Procedure history:    Colonoscopy (SNOMED CT 840577741) performed by Ke Wilson MD on 2016 at 66 Years.  Comments:  2016 8:32 AM EL[facility]ABETHT - [place]  Indication:  Heme positive stool.  Sedation:  Midazolam 3 mg IV, Fentanyl 150 mcg IV.  Impression:  Internal hemorrhoids that prolapse with straining, but require manual replacement into the anal canal (Grade III) found on perianal exam.  Significant looping of the  colon.  Normal colon.  Hemorrhoids.  Oophorectomy (SNOMED CT 362136082) on 1/3/2007 at 56 Years.  Resection (SNOMED CT 016037602) on 1/3/2007 at 56 Years.  Comments:  2010 1:09 PM Nicole Thibodeauxe  of vaginal septum  Hysterectomy (SNOMED CT 798007737) in the month of 2007 at 56 Years.  Comments:  2010 1:05 PM BONITA Katelin Regan Nicolee  laparoscopic-assisted vaginal  Hysteroscopy (SNOMED CT 521901235) in  at 56 Years.  Bunionectomy (SNOMED CT 60451699) on 2000 at 50 Years.  Osteotomy (SNOMED CT 859276620) on 2000 at 50 Years.  Tonsillectomy and adenoidectomy (SNOMED CT 849812759).  Appendectomy (SNOMED CT 227484565).   section (SNOMED CT 15648569).   section (SNOMED CT 50916276).  Dilation and curettage (SNOMED CT 68568727).   Social History:        Electronic Cigarette/Vaping Assessment            Electronic Cigarette Use: Never.      Alcohol Assessment: Current            Beer (12 oz), Wine (5 oz), 1-2 times per year, 2 drinks/episode average.                     Comments:                      2015 - Kimberly White LPN                     Infrequent alcohol use per pt.      Tobacco Assessment: Past            3 packs per day x 10 years.            Past            Former smoker, quit more than 30 days ago      Substance Abuse Assessment: Past            Marijuana, In college      Employment and Education Assessment            Part time, Work/School description: Interior design.  Highest education level: 3 yrs college.      Home and Environment Assessment            Marital status: .  Spouse/Partner name: Brandon.      Nutrition and Health Assessment            Type of diet: Plant based.  Wants to lose weight: Yes.  Sleeping concerns: Yes.  Feels highly stressed: No.      Exercise and Physical Activity Assessment: Regular exercise            Exercise frequency: Daily.  Exercise type: Walking, Dancing.      Sexual Assessment            Sexually active: Yes.  Sexual  orientation: Straight or heterosexual.      Other Assessment            First menses age 12.  Menstrual duration 5 days.  Cycle interval 25-30 days.  No abnormal Pap smear.        Physical Examination   Vital Signs   12/11/2020 2:38 PM CST Temperature Tympanic 97.9 DegF    Peripheral Pulse Rate 73 bpm    HR Method Electronic    Systolic Blood Pressure 142 mmHg  HI    Diastolic Blood Pressure 84 mmHg  HI    Mean Arterial Pressure 103 mmHg    BP Site Right arm    BP Method Manual    Oxygen Saturation 99 %      Measurements from flowsheet : Measurements   12/11/2020 2:38 PM CST Height/Length Estimated 62 in    Weight Measured - Standard 158 lb      General:  Alert and oriented, No acute distress.    Integumentary:  Rash c/w contact dermatitis.       Impression and Plan   Diagnosis     Contact dermatitis (LXO50-EY L25.9).     Course:  Progressing as expected.    Orders     Orders   Pharmacy:  triamcinolone 0.1% topical ointment (Prescribe): 1 jorge a, Topical, tid, # 30 gm, 1 Refill(s), Type: Acute, Pharmacy: [facility] [id number] [facility], 1 jorge a Topical tid, 62, in, 2/26/2020 11:28 AM CST, Height Measured, 158, lb, 12/11/2020 2:38 PM CST, Weight Measured.

## 2022-02-16 NOTE — PROGRESS NOTES
Patient:   IRENE BURGESS            MRN: [id number]            FIN: [id number]               Age:   66 years     Sex:  Female     :  1950   Associated Diagnoses:   Acute recurrent frontal sinusitis   Author:   Alexus Conti MD      Chief Complaint   2017 2:18 PM CST    c/o headache at night, congestion, cough x 1 week        History of Present Illness             The patient presents with sinus problem.  The sinus problem is located in the frontal sinus.  The sinus problem is characterized by nasal congestion, headache and facial pain.  The severity of the sinus problem is moderate.  The sinus problem is constant and is worsening.  The sinus problem has lasted for worsening over the past week.  Associated symptoms consist of cough (dry) and denies fever.        Health Status   Allergies:    Allergic Reactions (All)  Severity Not Documented  Lipitor (Myalgias)  Lisinopril (Cough)   Medications:  (Selected)   Prescriptions  Prescribed  calcitonin 200 intl units/inh nasal spray: 1 spray(s), nasal, daily, # 28 mL, 12 Refill(s), Type: Physician Stop, Pharmacy: [facility] [id number] [facility], 1 spray(s) nasal daily  clopidogrel 75 mg oral tablet: 1 tab(s) ( 75 mg ), po, daily, # 90 tab(s), 3 Refill(s), Type: Maintenance, Pharmacy: [id number], 1 tab(s) po daily  fluticasone 50 mcg/inh nasal spray: 1 spray(s), nasal, daily, # 3 EA, 3 Refill(s), Type: Maintenance, Pharmacy: [id number], Needs to be seen for more refills., 1 spray(s) nasal daily  Documented Medications  Documented  Calcium 500+D: 1 tab(s), chewed, bid, 0 Refill(s), Type: Maintenance  Prednisol 1% ophthalmic solution: 1 drop(s), left eye, daily, PRN: for arthritis, mL, 0 Refill(s), Type: Maintenance  multivitamin with minerals (w/ Iron): po, bid, 0 Refill(s), Type: Maintenance   Problem list:    All Problems (Selected)  Allergic Rhinitis / ICD-9-.9 / Confirmed  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  Osteoporosis (ICD-9-.00)  Allergic Rhinitis (ICD-9-.9)  Glaucoma, Narrow-Angle (ICD-9-.20)  Resolved  Tobacco abuse (ICD-9-.1):  Resolved.   Family History:    Peptic ulcer disease  Father  Grandfather (P)  Grandmother (P)  CA - Lung cancer  Mother ()  Cerebrovascular accident  Father  Antiphospholipid syndrome  Sister  Stroke  Father  Kidney disease  Mother ()  Grandmother (M)  Grandfather (M)  Tobacco abuse  Mother ()  Depression  Brother  Son  Brother  Endometriosis  Sister     Procedure history:    Colonoscopy (710114729) on 2016 at 66 Years.  Comments:  2016 8:32 AM - [place]  Indication:  Heme positive stool.  Sedation:  Midazolam 3 mg IV, Fentanyl 150 mcg IV.  Impression:  Internal hemorrhoids that prolapse with straining, but require manual replacement into the anal canal (Grade III) found on perianal exam.  Significant looping of the colon.  Normal colon.  Hemorrhoids.  Oophorectomy (160935801) on 1/3/2007 at 56 Years.  Resection (582667417) on 1/3/2007 at 56 Years.  Comments:  2010 1:09 PM - Carmina Spears  of vaginal septum  Hysterectomy (691787628) in the month of 2007 at 56 Years.  Comments:  2010 1:05 PM - Carmina Spears  laparoscopic-assisted vaginal  Hysteroscopy (746243590) in  at 56 Years.  Bunionectomy (78946155) on 2000 at 50 Years.  Osteotomy (700063248) on 2000 at 50 Years.  Tonsillectomy and adenoidectomy (752708284).  Appendectomy (529594067).   section (36137347).   section (63963305).  Dilation and curettage (88073493).  Childbirth  (8957992939).   Social History:        Alcohol Assessment: Current            0 drinks/episode average.  1 drinks/episode maximum.                     Comments:                      01/29/2015 - Kimberly White LPN                     Infrequent alcohol use per pt.      Tobacco Assessment: Past            Past                     Comments:                      11/11/2010 - Carmina Spears                     Quit in 1977      Employment and Education Assessment            Employed, Work/School description: .      Exercise and Physical Activity Assessment: Regular exercise            Exercise frequency: 5-6 times/week.  Exercise type: Walking.      Other Assessment            Marital Status,         Physical Examination   Vital Signs   1/12/2017 2:18 PM CST Temperature Tympanic 97.9 DegF    Peripheral Pulse Rate 90 bpm    Pulse Site Radial artery    HR Method Electronic    Systolic Blood Pressure 134 mmHg    Diastolic Blood Pressure 82 mmHg    Mean Arterial Pressure 99 mmHg    BP Site Left arm    BP Method Manual    Oxygen Saturation 97 %      Measurements from flowsheet : Measurements   1/12/2017 2:18 PM CST Height Measured - Standard 62 in    Ht/Wt Measurement Refused by Patient? Yes      General:  Alert and oriented, No acute distress.    Eye:  Pupils are equal, round and reactive to light, Normal conjunctiva.    HENT:  Normocephalic, Tympanic membranes are clear, Oral mucosa is moist, No pharyngeal erythema.         Sinus: Bilateral, Frontal sinus, Tenderness.    Neck:  Supple, Non-tender.    Respiratory:  Lungs are clear to auscultation.    Cardiovascular:  Normal rate, Regular rhythm.       Impression and Plan   Diagnosis     Acute recurrent frontal sinusitis (IQO88-UO J01.11).     Orders     Orders   Pharmacy:  amoxicillin 875 mg oral tablet (Prescribe): 1 tab(s) ( 875 mg ), PO, BID, x 10 day(s), # 20 tab(s), 0 Refill(s), Type: Maintenance, Pharmacy: [facility] [id number] [facility], 1 tab(s) po  bid,x10 day(s).

## 2022-02-16 NOTE — NURSING NOTE
Medicare Visit Entered On:  1/9/2019 10:12 AM CST    Performed On:  1/9/2019 9:53 AM CST by Meredith Stanton CMA               Summary   Chief Complaint :   AWV; review labs   Weight Measured :   159 lb(Converted to: 159 lb 0 oz, 72.12 kg)    Height Measured :   62 in(Converted to: 5 ft 2 in, 157.48 cm)    Body Mass Index :   29.08 kg/m2 (HI)    Body Surface Area :   1.77 m2   Systolic Blood Pressure :   132 mmHg (HI)    Diastolic Blood Pressure :   80 mmHg   Mean Arterial Pressure :   97 mmHg   Peripheral Pulse Rate :   66 bpm   BP Site :   Right arm   Pulse Site :   Radial artery   BP Method :   Manual   HR Method :   Manual   Temperature Tympanic :   96.9 DegF(Converted to: 36.1 DegC)  (LOW)    Meredith Stanton CMA - 1/9/2019 9:53 AM CST   Health Status   Allergies Verified? :   Yes   Medication History Verified? :   Yes   Immunizations Current :   Yes   Medical History Verified? :   Yes   Pre-Visit Planning Status :   Completed   Tobacco Use? :   Former smoker   Meredith Stanton CMA - 1/9/2019 9:53 AM CST   Consents   Consent for Immunization Exchange :   Consent Granted   Consent for Immunizations to Providers :   Consent Granted   Meredith Stanton CMA - 1/9/2019 9:53 AM CST   Meds / Allergies   (As Of: 1/9/2019 10:12:34 AM CST)   Allergies (Active)   Lipitor  Estimated Onset Date:   Unspecified ; Reactions:   Myalgias ; Created By:   Lesley Sin; Reaction Status:   Active ; Substance:   Lipitor ; Updated By:   Lesley Sin; Reviewed Date:   1/9/2019 10:09 AM CST      lisinopril  Estimated Onset Date:   Unspecified ; Reactions:   Cough ; Created By:   Lesley Sin; Reaction Status:   Active ; Category:   Drug ; Substance:   lisinopril ; Type:   Allergy ; Updated By:   Lesley Sin; Reviewed Date:   1/9/2019 10:09 AM CST        Medication List   (As Of: 1/9/2019 10:12:34 AM CST)   Prescription/Discharge Order    calcitonin  :   calcitonin ; Status:   Prescribed ; Ordered As Mnemonic:   calcitonin 200 intl  units/inh nasal spray ; Simple Display Line:   1 spray(s), Nasal, daily, alternate nostrils, 1 EA, 3 Refill(s) ; Ordering Provider:   Skip Theodore MD; Catalog Code:   calcitonin ; Order Dt/Tm:   9/24/2018 8:22:12 AM          clopidogrel  :   clopidogrel ; Status:   Prescribed ; Ordered As Mnemonic:   clopidogrel 75 mg oral tablet ; Simple Display Line:   75 mg, 1 tab(s), Oral, daily, 30 tab(s), 0 Refill(s) ; Ordering Provider:   Skip Theodore MD; Catalog Code:   clopidogrel ; Order Dt/Tm:   1/7/2019 9:28:24 AM          doxycycline  :   doxycycline ; Status:   Completed ; Ordered As Mnemonic:   doxycycline monohydrate 100 mg oral tablet ; Simple Display Line:   100 mg, 1 tab(s), PO, bid, for 10 day(s), 20 tab(s), 0 Refill(s) ; Ordering Provider:   Gallito FITZGERALD [facility]; Catalog Code:   doxycycline ; Order Dt/Tm:   12/8/2017 2:14:27 PM            Home Meds    calcium-vitamin D  :   calcium-vitamin D ; Status:   Documented ; Ordered As Mnemonic:   Calcium 500+D ; Simple Display Line:   1 tab(s), chewed, bid, 0 Refill(s) ; Catalog Code:   calcium-vitamin D ; Order Dt/Tm:   5/5/2016 10:55:40 AM          multivitamin with minerals  :   multivitamin with minerals ; Status:   Documented ; Ordered As Mnemonic:   multivitamin with minerals (w/ Iron) ; Simple Display Line:   po, bid, 0 Refill(s) ; Catalog Code:   multivitamin with minerals ; Order Dt/Tm:   5/5/2016 10:55:57 AM          prednisoLONE ophthalmic  :   prednisoLONE ophthalmic ; Status:   Documented ; Ordered As Mnemonic:   Prednisol 1% ophthalmic solution ; Simple Display Line:   1 drop(s), left eye, daily, mL, PRN: for arthritis ; Catalog Code:   prednisoLONE ophthalmic ; Order Dt/Tm:   8/30/2011 4:15:01 PM            Family History   Family History   (As Of: 1/9/2019 10:12:34 AM CST)   Grandmother (M):   Relation:   Grandmother (M) ;           Nomenclature:   Kidney disease ; Value:   Positive          Grandmother (P):   Relation:   Grandmother (P)  ;           Nomenclature:   Peptic ulcer disease ; Value:   Positive          Grandfather (M):   Relation:   Grandfather (M) ;           Nomenclature:   Kidney disease ; Value:   Positive          Grandfather (P):   Relation:   Grandfather (P) ;           Nomenclature:   Peptic ulcer disease ; Value:   Positive          Brother:   Relation:   Brother ; Gender:   Male ;           Nomenclature:   Depression ; Value:   Positive          Son:   Relation:   Son ; Gender:   Male ;           Nomenclature:   Depression ; Value:   Positive          Mother:   Relation:   Mother ; Gender:   Female ;  ; Age at Death:    48 Years            Nomenclature:   CA - Lung cancer ; Value:   Positive            Nomenclature:   Tobacco abuse ; Value:   Positive            Nomenclature:   Kidney disease ; Value:   Positive          Sister:   Relation:   Sister ; Gender:   Female ;           Nomenclature:   Endometriosis ; Value:   Positive            Nomenclature:   Antiphospholipid syndrome ; Value:   Positive          Brother:   Relation:   Brother ; Gender:   Male ;           Nomenclature:   Depression ; Value:   Positive          Father:   Relation:   Father ; Gender:   Male ;           Nomenclature:   Cerebrovascular accident ; Value:   Positive            Nomenclature:   Stroke ; Value:   Positive            Nomenclature:   Peptic ulcer disease ; Value:   Positive            Social History   Social History   (As Of: 2019 10:12:34 AM CST)   Alcohol:  Current      0 drinks/episode average.  1 drinks/episode maximum.   Comments:  2015 9:40 AM - Kimberly White LPN: Infrequent alcohol use per pt.   (Last Updated: 2015 9:40:33 AM CST by Kimberly White LPN)          Tobacco:  Past      Past   Comments:  2010 1:13 PM - Carmina Spears: Quit in    (Last Updated: 2010 1:13:19 PM CST by Carmina Spears)          Employment and Education:        Employed, Work/School description: .   (Last  Updated: 11/11/2010 1:13:44 PM CST by Carmina Spears)          Exercise and Physical Activity:  Regular exercise      Exercise frequency: 5-6 times/week.  Exercise type: Walking.   (Last Updated: 7/5/2010 2:09:58 PM CDT by Lesley Jacobo)          Other:        Marital Status,    (Last Updated: 7/5/2010 2:10:21 PM CDT by Lesley Jacobo)            Geriatric Depression Screening   Geriatric Depression Satisfied Life :   Yes   Geriatric Depression Dropped Activities :   No   Geriatric Depression Life Empty :   No   Geriatric Depression Bored :   No   Geriatric Depression Good Spirits :   Yes   Geriatric Depression Afraid Bad Things :   No   Geriatric Depression Feel Happy :   Yes   Geriatric Depression Feel Helpless :   No   Geriatric Depression Prefer to Stay Home :   Yes   Geriatric Depression Memory Problems :   No   Geriatric Depression Wonderful Be Alive :   Yes   Geriatric Depression Feel Worthless :   No   Geriatric Depression Situation Hopeless :   No   Geriatric Depression Others Better Off :   No   Geriatric Depression Full of Energy :   Yes   Geriatric Depression Total Score :   1    Stanton FLACOSharifa - 1/9/2019 9:53 AM CST   Hearing and Vision Screening   Visual Acuity Evaluated Left Eye :   20/20   Visual Acuity Evaluated Right Eye :   20/20   Vision Test Type :   Snellen   Vision Screen Comments :   20/13 both eyes; glasses worn    Maximino SAMSharifa - 1/9/2019 11:18 AM CST   Audiogram Result Right Ear :   Pass   Audiogram Result Left Ear :   Pass   Hearing Screen Comments :   Repeat in one year   Maximino SAM Meredith - 1/9/2019 9:53 AM CST   Home Safety Screen   Emergency Numbers Kept/Updated :   Yes   Aware of Smoking Dangers :   Yes   Smoke Alarms/Fire Extinguisher Available :   Yes   Household Members Fire Safety Knowledge :   Yes   Firearms Unloaded and Secure :   Yes   Floor Rugs Removed or Fastened :   No   Mats in Bathtub/Shower :   Yes   Stairway Rails or Banisters :   Yes   Outdoor  Clutter Safety :   Yes   Indoor Clutter Safety :   Yes   Electrical Cord Safety :   Yes   Meredith Stanton CMA 1/9/2019 9:53 AM CST   Grewal Fall Risk   History of Fall in Last 3 Months Grewal :   No   Meredith Stanton CMA - 1/9/2019 9:53 AM CST   Functional Assessment   Focused Functional Assessment Grid   Bathing :   Independent (2)   Dressing :   Independent (2)   Toileting :   Independent (2)   Transferring Bed or Chair :   Independent (2)   Feeding :   Independent (2)   Meredith Stanton CMA 1/9/2019 9:53 AM CST   Capable of Shopping :   Yes   Capable of Walking :   Yes   Capable of Housekeeping :   Yes   Capable of Managing Medications :   Yes   Capable of Handling Finances :   Yes   Meredith Stanton CMA 1/9/2019 9:53 AM CST

## 2022-03-02 NOTE — TELEPHONE ENCOUNTER
Entered by Alexus Conti MD on February 02, 2022 8:55:18 AM CST  ---------------------  From: Alexus Conti MD   To: [facility] #[id number]    Sent: 2/2/2022 8:55:18 AM CST  Subject: Medication Management     ** Submitted: **  Complete:triamcinolone topical (triamcinolone 0.1% topical ointment)   Signed by Alexus Conti MD  2/2/2022 2:55:00 PM [facility]    ** Approved with modifications: **  triamcinolone topical (TRIAMCINOLONE 0.1% OINTMENT 30GM)  APPLY EXTERNALLY TO THE AFFECTED AREA THREE TIMES DAILY  Qty:  60 gm        Days Supply:  30        Refills:  0          Substitutions Allowed     Route To Pharmacy - [facility] #[id number]               ------------------------------------------  From: [facility] #[id number]  To: Alexus Conti MD  Sent: January 31, 2022 11:46:52 AM CST  Subject: Medication Management  Due: January 21, 2022 11:04:18 AM CST     ** On Hold Pending Signature **     Dispensed Drug: triamcinolone topical (triamcinolone 0.1% topical ointment), APPLY EXTERNALLY TO THE AFFECTED AREA THREE TIMES DAILY  Quantity: 60 gm  Days Supply: 30  Refills: 0  Substitutions Allowed  Notes from Pharmacy:  ------------------------------------------

## 2022-03-02 NOTE — TELEPHONE ENCOUNTER
---------------------  From: Alexus Conti MD   To: [facility] [employer] [facility] ([id number]);     Sent: 2/15/2022 3:23:57 PM CST  Subject: DEXA order     Please fax DEXA order to [facility].Order faxed.

## 2022-04-03 ENCOUNTER — HEALTH MAINTENANCE LETTER (OUTPATIENT)
Age: 72
End: 2022-04-03

## 2022-04-20 ENCOUNTER — DOCUMENTATION ONLY (OUTPATIENT)
Dept: LAB | Facility: CLINIC | Age: 72
End: 2022-04-20
Payer: COMMERCIAL

## 2022-04-20 ENCOUNTER — TELEPHONE (OUTPATIENT)
Dept: FAMILY MEDICINE | Facility: CLINIC | Age: 72
End: 2022-04-20
Payer: COMMERCIAL

## 2022-04-20 DIAGNOSIS — E78.01 FAMILIAL HYPERCHOLESTEROLEMIA: Primary | ICD-10-CM

## 2022-04-20 DIAGNOSIS — M81.0 AGE RELATED OSTEOPOROSIS, UNSPECIFIED PATHOLOGICAL FRACTURE PRESENCE: Primary | ICD-10-CM

## 2022-04-20 DIAGNOSIS — M81.0 AGE RELATED OSTEOPOROSIS, UNSPECIFIED PATHOLOGICAL FRACTURE PRESENCE: ICD-10-CM

## 2022-04-20 RX ORDER — CALCITONIN SALMON 200 [IU]/.09ML
1 SPRAY, METERED NASAL DAILY
Qty: 3.7 ML | Refills: 0 | Status: SHIPPED | OUTPATIENT
Start: 2022-04-20 | End: 2022-05-04

## 2022-04-20 RX ORDER — CALCITONIN SALMON 200 [IU]/.09ML
SPRAY, METERED NASAL
Qty: 11.1 ML | OUTPATIENT
Start: 2022-04-20

## 2022-04-20 RX ORDER — CALCITONIN SALMON 200 [IU]/.09ML
1 SPRAY, METERED NASAL DAILY
COMMUNITY
Start: 2021-11-05 | End: 2022-04-20

## 2022-04-20 NOTE — TELEPHONE ENCOUNTER
Last Written Prescription Date:  2/16/22  Last Fill Quantity: 1,  # refills: 0   Last office visit with prescribing provider:  3/1/21  Future Office Visit:   Next 5 appointments (look out 90 days)    May 04, 2022  9:20 AM  (Arrive by 9:00 AM)  Annual Wellness Visit with Alexus Conti MD  [facility] ([facility] ) [address] [place] [id number] [phone]                 Please advise    Shaneka Griffith RN

## 2022-04-20 NOTE — TELEPHONE ENCOUNTER
Reason for Call:  Medication or medication refill:    Do you use a [facility]?  Name of the pharmacy and phone number for the current request:  [facility]     Name of the medication requested: Calcitonin 200     Other request: Patient would like this rx called in she has her wellness visit scheduled on 05/04/22     Can we leave a detailed message on this number? YES    Phone number patient can be reached at: Home number on file [phone] (home)    Best Time: any    Call taken on 4/20/2022 at 1:45 PM by Lakshmi Bee

## 2022-04-21 PROBLEM — J30.9 ALLERGIC RHINITIS: Status: ACTIVE | Noted: 2022-04-21

## 2022-04-21 PROBLEM — M85.80 OSTEOPENIA: Status: ACTIVE | Noted: 2022-04-21

## 2022-04-21 PROBLEM — E78.01 FAMILIAL HYPERCHOLESTEROLEMIA: Status: ACTIVE | Noted: 2022-04-21

## 2022-04-21 PROBLEM — H40.20X0 PRIMARY ANGLE-CLOSURE GLAUCOMA: Status: ACTIVE | Noted: 2022-04-21

## 2022-04-21 RX ORDER — CLOPIDOGREL BISULFATE 75 MG/1
75 TABLET ORAL DAILY
COMMUNITY
Start: 2021-04-27 | End: 2022-04-26

## 2022-04-21 RX ORDER — ROSUVASTATIN CALCIUM 10 MG/1
10 TABLET, COATED ORAL AT BEDTIME
COMMUNITY
Start: 2021-04-27 | End: 2022-04-26

## 2022-04-26 ENCOUNTER — LAB (OUTPATIENT)
Dept: LAB | Facility: CLINIC | Age: 72
End: 2022-04-26
Payer: COMMERCIAL

## 2022-04-26 DIAGNOSIS — R76.0 ANTICARDIOLIPIN ANTIBODY POSITIVE: Primary | ICD-10-CM

## 2022-04-26 DIAGNOSIS — E78.01 FAMILIAL HYPERCHOLESTEROLEMIA: Primary | ICD-10-CM

## 2022-04-26 DIAGNOSIS — E78.01 FAMILIAL HYPERCHOLESTEROLEMIA: ICD-10-CM

## 2022-04-26 LAB
ANION GAP SERPL CALCULATED.3IONS-SCNC: 4 MMOL/L (ref 3–14)
BUN SERPL-MCNC: 8 MG/DL (ref 7–30)
CALCIUM SERPL-MCNC: 9.6 MG/DL (ref 8.5–10.1)
CHLORIDE BLD-SCNC: 107 MMOL/L (ref 94–109)
CHOLEST SERPL-MCNC: 158 MG/DL
CO2 SERPL-SCNC: 29 MMOL/L (ref 20–32)
CREAT SERPL-MCNC: 0.6 MG/DL (ref 0.52–1.04)
FASTING STATUS PATIENT QL REPORTED: YES
GFR SERPL CREATININE-BSD FRML MDRD: >90 ML/MIN/1.73M2
GLUCOSE BLD-MCNC: 95 MG/DL (ref 70–99)
HDLC SERPL-MCNC: 78 MG/DL
LDLC SERPL CALC-MCNC: 72 MG/DL
NONHDLC SERPL-MCNC: 80 MG/DL
POTASSIUM BLD-SCNC: 4.4 MMOL/L (ref 3.4–5.3)
SODIUM SERPL-SCNC: 140 MMOL/L (ref 133–144)
TRIGL SERPL-MCNC: 42 MG/DL

## 2022-04-26 PROCEDURE — 80048 BASIC METABOLIC PNL TOTAL CA: CPT

## 2022-04-26 PROCEDURE — 36415 COLL VENOUS BLD VENIPUNCTURE: CPT

## 2022-04-26 PROCEDURE — 80061 LIPID PANEL: CPT

## 2022-04-26 RX ORDER — ROSUVASTATIN CALCIUM 10 MG/1
10 TABLET, COATED ORAL AT BEDTIME
Qty: 90 TABLET | Refills: 0 | Status: SHIPPED | OUTPATIENT
Start: 2022-04-26 | End: 2022-05-04

## 2022-04-27 ASSESSMENT — ACTIVITIES OF DAILY LIVING (ADL): CURRENT_FUNCTION: NO ASSISTANCE NEEDED

## 2022-04-28 RX ORDER — CLOPIDOGREL BISULFATE 75 MG/1
75 TABLET ORAL DAILY
Qty: 30 TABLET | Refills: 1 | Status: SHIPPED | OUTPATIENT
Start: 2022-04-28 | End: 2022-05-04

## 2022-04-28 NOTE — TELEPHONE ENCOUNTER
Refill to cover to next appointment.    Last appointment: 3/1/21 (wellness).  Next appointment: 5/4/22

## 2022-05-01 DIAGNOSIS — R76.0 ANTICARDIOLIPIN ANTIBODY POSITIVE: ICD-10-CM

## 2022-05-04 ENCOUNTER — OFFICE VISIT (OUTPATIENT)
Dept: FAMILY MEDICINE | Facility: CLINIC | Age: 72
End: 2022-05-04
Payer: COMMERCIAL

## 2022-05-04 VITALS
BODY MASS INDEX: 26.26 KG/M2 | SYSTOLIC BLOOD PRESSURE: 130 MMHG | DIASTOLIC BLOOD PRESSURE: 70 MMHG | HEART RATE: 66 BPM | OXYGEN SATURATION: 98 % | WEIGHT: 143.6 LBS

## 2022-05-04 DIAGNOSIS — M85.859 OSTEOPENIA OF HIP, UNSPECIFIED LATERALITY: ICD-10-CM

## 2022-05-04 DIAGNOSIS — E78.01 FAMILIAL HYPERCHOLESTEROLEMIA: ICD-10-CM

## 2022-05-04 DIAGNOSIS — M81.0 AGE RELATED OSTEOPOROSIS, UNSPECIFIED PATHOLOGICAL FRACTURE PRESENCE: ICD-10-CM

## 2022-05-04 DIAGNOSIS — R76.0 ANTICARDIOLIPIN ANTIBODY POSITIVE: ICD-10-CM

## 2022-05-04 DIAGNOSIS — L30.1 DYSHYDROSIS: ICD-10-CM

## 2022-05-04 DIAGNOSIS — Z00.00 ENCOUNTER FOR MEDICARE ANNUAL WELLNESS EXAM: Primary | ICD-10-CM

## 2022-05-04 PROCEDURE — 99397 PER PM REEVAL EST PAT 65+ YR: CPT | Performed by: FAMILY MEDICINE

## 2022-05-04 PROCEDURE — 99214 OFFICE O/P EST MOD 30 MIN: CPT | Mod: 25 | Performed by: FAMILY MEDICINE

## 2022-05-04 RX ORDER — ROSUVASTATIN CALCIUM 10 MG/1
10 TABLET, COATED ORAL AT BEDTIME
Qty: 90 TABLET | Refills: 3 | Status: SHIPPED | OUTPATIENT
Start: 2022-05-04 | End: 2022-08-01

## 2022-05-04 RX ORDER — FLUTICASONE PROPIONATE 50 MCG
1 SPRAY, SUSPENSION (ML) NASAL DAILY
COMMUNITY
End: 2022-05-04

## 2022-05-04 RX ORDER — CLOPIDOGREL BISULFATE 75 MG/1
75 TABLET ORAL DAILY
Qty: 90 TABLET | Refills: 3 | Status: SHIPPED | OUTPATIENT
Start: 2022-05-04 | End: 2023-04-28

## 2022-05-04 RX ORDER — TRIAMCINOLONE ACETONIDE 1 MG/G
OINTMENT TOPICAL 3 TIMES DAILY PRN
Qty: 60 G | Refills: 3 | Status: SHIPPED | OUTPATIENT
Start: 2022-05-04 | End: 2023-09-07

## 2022-05-04 RX ORDER — CALCITONIN SALMON 200 [IU]/.09ML
1 SPRAY, METERED NASAL DAILY
Qty: 3.7 ML | Refills: 11 | Status: SHIPPED | OUTPATIENT
Start: 2022-05-04 | End: 2022-11-01

## 2022-05-04 RX ORDER — TRIAMCINOLONE ACETONIDE 1 MG/G
1 OINTMENT TOPICAL 3 TIMES DAILY PRN
COMMUNITY
Start: 2022-02-02 | End: 2022-05-04

## 2022-05-04 RX ORDER — PREDNISOLONE ACETATE 10 MG/ML
1 SUSPENSION/ DROPS OPHTHALMIC 3 TIMES DAILY PRN
COMMUNITY
End: 2022-12-02

## 2022-05-04 RX ORDER — CLOPIDOGREL BISULFATE 75 MG/1
TABLET ORAL
Qty: 90 TABLET | OUTPATIENT
Start: 2022-05-04

## 2022-05-04 ASSESSMENT — ACTIVITIES OF DAILY LIVING (ADL): CURRENT_FUNCTION: NO ASSISTANCE NEEDED

## 2022-05-04 NOTE — PROGRESS NOTES
"SUBJECTIVE:   Mone Humphreys is a 72 year old female who presents for Preventive Visit.    Patient is here for her annual wellness visit.  We will also review chronic conditions and refill medications.    Patient has been advised of split billing requirements and indicates understanding: Yes  Are you in the first 12 months of your Medicare coverage?  No    Patient with a history of anticardiolipin antibody.  She continues on Plavix.  It was recommended that she continue on this for her lifetime.  She has concerns about having to stay on it because it does cause some bruising.  She does have a history of some mild strokes prior to going on this medication.  She does not otherwise have any side effects of the medicine.    We reviewed patient's bone density that was done in March for [facility].  It showed worsening osteopenia.  Patient has been on calcitonin.  This has been well-tolerated.  She does not want to try bisphosphonate because of concerns of dental issues.  Reviewed options for neck steps.  She is willing to pursue a consultation with endocrinology to discuss options.  Has inherited familial hypercholesterolemia.  Patient is overall stable currently.    Healthy Habits:     In general, how would you rate your overall health?  Good    Frequency of exercise:  4-5 days/week    Duration of exercise:  30-45 minutes    Do you usually eat at least 4 servings of fruit and vegetables a day, include whole grains    & fiber and avoid regularly eating high fat or \"junk\" foods?  Yes    Taking medications regularly:  Yes    Barriers to taking medications:  None    Medication side effects:  None    Ability to successfully perform activities of daily living:  No assistance needed    Home Safety:  No safety concerns identified    Hearing Impairment:  Difficulty following a conversation in a noisy restaurant or crowded room and difficulty understanding soft or whispered speech    In the past 6 months, have you been " bothered by leaking of urine?  No    In general, how would you rate your overall mental or emotional health?  Good      PHQ-2 Total Score: 1    Additional concerns today:  Yes    Do you feel safe in your environment? Yes    Have you ever done Advance Care Planning? (For example, a Health Directive, POLST, or a discussion with a medical provider or your loved ones about your wishes): No, advance care planning information given to patient to review.  Patient declined advance care planning discussion at this time.       Fall risk  Fallen 2 or more times in the past year?: No  Any fall with injury in the past year?: No    Cognitive Screening   1) Repeat 3 items (Leader, Season, Table)    2) Clock draw: NORMAL  3) 3 item recall: Recalls 2 objects   Results: NORMAL clock, 1-2 items recalled: COGNITIVE IMPAIRMENT LESS LIKELY    Mini-CogTM Copyright S Irving. Licensed by the author for use in [facility]; reprinted with permission (mirian@[facility]). All rights reserved.      Do you have sleep apnea, excessive snoring or daytime drowsiness?: no    Reviewed and updated as needed this visit by clinical staff   Tobacco  Allergies  Meds  Problems  Med Hx  Surg Hx  Fam Hx            Reviewed and updated as needed this visit by Provider   Tobacco  Allergies  Meds  Problems  Med Hx  Surg Hx  Fam Hx           Social History     Tobacco Use     Smoking status: Former Smoker     Packs/day: 2.00     Years: 10.00     Pack years: 20.00     Types: Cigarettes     Start date: 1966     Quit date: 1976     Years since quittin.2     Smokeless tobacco: Never Used   Substance Use Topics     Alcohol use: Yes     Comment: A few drinks a year         Alcohol Use 2022   Prescreen: >3 drinks/day or >7 drinks/week? No       Current providers sharing in care for this patient include:   Patient Care Team:  Skip Theodore MD as PCP - General (Family Medicine)  Skip Theodore MD as Assigned  "PCP  Dena Joyner MD as MD (Endocrinology, Diabetes, and Metabolism)    The following health maintenance items are reviewed in Epic and correct as of today:  Health Maintenance Due   Topic Date Due     ZOSTER IMMUNIZATION (2 of 3) 06/05/2014     DTAP/TDAP/TD IMMUNIZATION (7 - Tdap) 06/24/2015     COVID-19 Vaccine (2 - Moderna series) 03/17/2021               Review of Systems      OBJECTIVE:   /70   Pulse 66   Wt 65.1 kg (143 lb 9.6 oz)   SpO2 98%   BMI 26.26 kg/m   Estimated body mass index is 26.26 kg/m  as calculated from the following:    Height as of 10/18/21: 1.575 m (5' 2\").    Weight as of this encounter: 65.1 kg (143 lb 9.6 oz).  Physical Exam  GENERAL APPEARANCE: healthy, alert and no distress  EYES: Eyes grossly normal to inspection, PERRL and conjunctivae and sclerae normal  HENT: ear canals and TM's normal, nose and mouth without ulcers or lesions, oropharynx clear and oral mucous membranes moist  NECK: no adenopathy, no asymmetry, masses, or scars and thyroid normal to palpation  RESP: lungs clear to auscultation - no rales, rhonchi or wheezes  CV: regular rate and rhythm, normal S1 S2, no S3 or S4, no murmur, click or rub, no peripheral edema and peripheral pulses strong  ABDOMEN: soft, nontender, no hepatosplenomegaly, no masses and bowel sounds normal  MS: no musculoskeletal defects are noted and gait is age appropriate without ataxia  SKIN: no suspicious lesions or rashes  NEURO: Normal strength and tone, sensory exam grossly normal, mentation intact and speech normal  PSYCH: mentation appears normal and affect normal/bright    Diagnostic Test Results:  Labs reviewed in Epic    ASSESSMENT / PLAN:   (Z00.00) Encounter for Medicare annual wellness exam  (primary encounter diagnosis)  Comment: Stable  Plan: Overall preventive services are up-to-date.  Reviewed immunizations.  No changes at this time.  Continue annual fasting labs.  No further colon cancer screening is needed given age.  " "Continue mammogram every 1 to 2 years.    (M85.859) Osteopenia of hip, unspecified laterality  Comment: Bone density reviewed and shows persistent osteopenia that has worsened since last DEXA  Plan: Adult Endocrinology  Referral        Order placed for referral to endocrinology to discuss alternate options for osteopenia treatment given concerns about dental health    (E78.01) Familial hypercholesterolemia  Comment: Well-controlled on statin  Plan: rosuvastatin (CRESTOR) 10 MG tablet        Medication refilled    (R76.0) Anticardiolipin antibody positive  Comment: Discussed persistent increased risk of strokes that would worsen without treatment  Plan: clopidogrel (PLAVIX) 75 MG tablet        At this time benefit of Plavix treatment outweighs any increased risk.  Patient agrees to continue treatment    (M81.0) Age related osteoporosis, unspecified pathological fracture presence  Comment: Patient with worsening osteopenia with increased fracture risk  Plan: calcitonin, salmon, (MIACALCIN) 200 UNIT/ACT         nasal spray        We will continue calcitonin which did initially improve bone density but refer on for discussion of alternate treatment given worsening    (L30.1) Dyshydrosis  Comment: Stable  Plan: triamcinolone (KENALOG) 0.1 % external ointment              Patient has been advised of split billing requirements and indicates understanding: Yes    COUNSELING:  Reviewed preventive health counseling, as reflected in patient instructions       Regular exercise       Healthy diet/nutrition       Fall risk prevention       Osteoporosis prevention/bone health       Colon cancer screening    Estimated body mass index is 26.26 kg/m  as calculated from the following:    Height as of 10/18/21: 1.575 m (5' 2\").    Weight as of this encounter: 65.1 kg (143 lb 9.6 oz).        She reports that she quit smoking about 46 years ago. Her smoking use included cigarettes. She started smoking about 55 years ago. She has " a 20.00 pack-year smoking history. She has never used smokeless tobacco.      Appropriate preventive services were discussed with this patient, including applicable screening as appropriate for cardiovascular disease, diabetes, osteopenia/osteoporosis, and glaucoma.  As appropriate for age/gender, discussed screening for colorectal cancer, prostate cancer, breast cancer, and cervical cancer. Checklist reviewing preventive services available has been given to the patient.    Reviewed patients plan of care and provided an AVS. The Basic Care Plan (routine screening as documented in Health Maintenance) for Mone meets the Care Plan requirement. This Care Plan has been established and reviewed with the Patient.    Counseling Resources:  ATP IV Guidelines  Pooled Cohorts Equation Calculator  Breast Cancer Risk Calculator  Breast Cancer: Medication to Reduce Risk  FRAX Risk Assessment  ICSI Preventive Guidelines  Dietary Guidelines for Americans, 2010  USDA's MyPlate  ASA Prophylaxis  Lung CA Screening    Alexus Conti MD  [facility]    Identified Health Risks:    The patient was provided with written information regarding signs of hearing loss.

## 2022-05-09 RX ORDER — CALCITONIN SALMON 200 [IU]/.09ML
SPRAY, METERED NASAL
Qty: 11.1 ML | OUTPATIENT
Start: 2022-05-09

## 2022-06-16 NOTE — PROCEDURES
Accession Number:       [id number]  PATHOLOGIST:     Nadiya Polanco M.D., Board Certified in Anatomic Pathology, Clinical Pathology and Cytopathology 1  (electronic signature)  REPORT NOTES:     Key portions of this case have been reviewed by a Board Certified Dermatopathologist.  A SOURCE:     Skin, left thigh  A GROSS DESCRIPTION:     See comment       Specimen is received in formalin, labeled with       multiple patient identifier(s) and consists of       one piece of skin measuring 0.4 x 0.4 x 0.3 cm,       irregular in shape and tan-gray in color. The       margins are inked green. The specimen is bisected       and entirely submitted in one cassette(s).         Gross exam(s) performed at: [address] [address]         : EUGENE NOLASCO MD  A DIAGNOSIS:     Superficial/deep perivascular and interstitial dermatitis with numerous neutrophils and eosinophils and ulcer. See comment.  A COMMENT:     The lesion is extending to subcutaneous tissue. Differential diagnosis includes infectious process, arthropod(including spider) bite reaction and cellulitis. Clinical correlation/follow-up is recommended.

## 2022-07-29 DIAGNOSIS — E78.01 FAMILIAL HYPERCHOLESTEROLEMIA: ICD-10-CM

## 2022-08-01 RX ORDER — ROSUVASTATIN CALCIUM 10 MG/1
TABLET, COATED ORAL
Qty: 90 TABLET | Refills: 2 | Status: SHIPPED | OUTPATIENT
Start: 2022-08-01 | End: 2023-07-25

## 2022-08-01 NOTE — TELEPHONE ENCOUNTER
Prescription approved per [facility] Refill Protocol.    Last Written Prescription Date: 5/4/22  Last Fill Quantity: 90,  # refills: 3   Last office visit: 5/4/2022 with prescribing provider

## 2022-09-07 ASSESSMENT — ENCOUNTER SYMPTOMS
ARTHRALGIAS: 1
ABDOMINAL PAIN: 0
JAUNDICE: 0
VOMITING: 0
DIARRHEA: 0
DECREASED LIBIDO: 1
STIFFNESS: 1
BRUISES/BLEEDS EASILY: 1
HOT FLASHES: 0
NAUSEA: 0
BLOOD IN STOOL: 0
NECK PAIN: 1
SWOLLEN GLANDS: 0
MYALGIAS: 1
RECTAL PAIN: 0
JOINT SWELLING: 0
BOWEL INCONTINENCE: 0
MUSCLE WEAKNESS: 0
HEARTBURN: 0
CONSTIPATION: 0
MUSCLE CRAMPS: 0
BACK PAIN: 0
BLOATING: 0

## 2022-09-14 ENCOUNTER — OFFICE VISIT (OUTPATIENT)
Dept: ENDOCRINOLOGY | Facility: CLINIC | Age: 72
End: 2022-09-14
Payer: COMMERCIAL

## 2022-09-14 VITALS
BODY MASS INDEX: 25.95 KG/M2 | WEIGHT: 141.9 LBS | SYSTOLIC BLOOD PRESSURE: 165 MMHG | HEART RATE: 69 BPM | DIASTOLIC BLOOD PRESSURE: 89 MMHG

## 2022-09-14 DIAGNOSIS — E83.59 OTHER DISORDERS OF CALCIUM METABOLISM: ICD-10-CM

## 2022-09-14 DIAGNOSIS — M85.859 OSTEOPENIA OF HIP, UNSPECIFIED LATERALITY: ICD-10-CM

## 2022-09-14 PROCEDURE — 99204 OFFICE O/P NEW MOD 45 MIN: CPT | Performed by: INTERNAL MEDICINE

## 2022-09-14 NOTE — LETTER
"    9/14/2022         RE: Mone Humphreys   [facility] [address]        Dear Colleague,    Thank you for referring your patient, Mone Humphreys, to the [facility] [place]. Please see a copy of my visit note below.    Mone Humphreys is a 72 year old yo female who presents today for evaluation of osteopenia. She was referred by Dr Theodore, her primary care provider. Of note, she also has past medical history of Hypercholesterolemia, Glaucoma, antiphospholipid syndrome.    Chief Complaint   Patient presents with     Osteoporosis     Has history of gum disease, follows with periodontist, requires interval tissue grafts. Describes the disease as \"pockets\" that are under control. However, was told not to take bisphosphonates for this reason.      1) Osteoporosis  Diagnosis: 50s, routine dexa  Previous Fractures: never  Bone-specific therapy: Fosamax, taken off, Calcitonin nasal spray  Family History of Hip Fx: Father had stroke in 50s,  hip replacement, unclear why, R side paralyzed, doesn't remember a fall or anything  Hormone History (Menopause, Testosterone): 50s , had heavy menses (double uterus and APS)  Steroids: eye drops, joint injections every 3 months >10 years  PPIs: none  Antiepileptics: none  Anticoagulation: none- plavix  Autoimmune disease: Antiphospholipid Syndrome  CKD: none  Nephrolithiasis: none  Other risk factors: none   Ca/D: Becker Calcium Gummies 500mg BID, Vitamin D  25mg (1000u) BID--- Total 1000mg Ca, 2000u Vit D, Protein shake daily (? Ca supplement), eats cottage cheese or yogurt daily  Exercise: contemporary line dancing 2-3x per week, walk (outside or tredmill) daily 45min, many grandchildren and babysit every Friday, yardwork   Smoking: quit 1997  Alcohol: rare  Height loss- Tallest 5'4.5\", Current 5'2\"       Relevant Family History:    Active diagnoses this visit:  Osteopenia of hip, unspecified laterality     ROS: 10 point ROS neg other than the " symptoms noted above in the HPI.      Objective:  BP (!) 165/89   Pulse 69   Wt 64.4 kg (141 lb 14.4 oz)   BMI 25.95 kg/m      Physical Exam   CONSTITUTIONAL: healthy, alert and NAD, responding appropriately  ENT: normocephalic, no visual evidence of trauma, normal nose and oral mucosa  EYES: conjunctivae and sclerae normal, no exophthalmos or proptosis  THYROID:  no visualized nodules or goiter  LUNGS: no audible wheeze, cough or visible cyanosis, no visible retractions or increased work of breathing  EXTREMITIES: no hand tremors  NEUROLOGY: cranial nerves grossly intact with no obvious deficit.  SKIN:  no visualized skin lesions or rash, no edema visualized  PSYCH: mentation appears normal, normal judgement        No results found for: TSH, T4, XJ2277, AG4764, [id number], VITD25, ESTROGEN, TESTOSTERONE, PROLACTIN, LH, FSH, PTHI, 3506  Last Comprehensive Metabolic Panel:  Sodium   Date Value Ref Range Status   04/26/2022 140 133 - 144 mmol/L Final     Potassium   Date Value Ref Range Status   04/26/2022 4.4 3.4 - 5.3 mmol/L Final     Chloride   Date Value Ref Range Status   04/26/2022 107 94 - 109 mmol/L Final     Carbon Dioxide (CO2)   Date Value Ref Range Status   04/26/2022 29 20 - 32 mmol/L Final     Anion Gap   Date Value Ref Range Status   04/26/2022 4 3 - 14 mmol/L Final     Glucose   Date Value Ref Range Status   04/26/2022 95 70 - 99 mg/dL Final     Urea Nitrogen   Date Value Ref Range Status   04/26/2022 8 7 - 30 mg/dL Final     Creatinine   Date Value Ref Range Status   04/26/2022 0.60 0.52 - 1.04 mg/dL Final     GFR Estimate   Date Value Ref Range Status   04/26/2022 >90 >60 mL/min/1.73m2 Final     Comment:     Effective December 21, 2021 eGFRcr in adults is calculated using the 2021 CKD-EPI creatinine equation which includes age and gender (Jak et al., NEJ, DOI: 10.1056/OQPWoc5061661)     Calcium   Date Value Ref Range Status   04/26/2022 9.6 8.5 - 10.1 mg/dL Final         Alkaline Phosphatase    Date Value Ref Range Status   03/01/2021 64 37 - 153 unit/L Final     Comment:     Lab test performed by:  Lab Mnemonic:[facility]  [facility]  [address] [place] [address]  Aramis Hung M.D.  Unit of Measure:   U/L  QUEST Specimen received date and time: 01-MAR-2021 13:26:00.00         DEXA 2019-     DEXA May 2022:            ASSESSMENT / PLAN:  (M85.859) Osteopenia of hip, unspecified laterality      1) Osteopenia with high frax score  - Of note has had significant decline (at least 4%) over all sites over past 3 years  - Has been on calcitonin for 15+ years, with zachley waining effect  - FRAX score now into range with recommended therapy  - Would recommend 2-4 years bisphosphonate therapy ideally to improve BMD into better range and then have drug holiday. Will need to clarify underlying gum/dental disease with periodontist   - If unable to use bisphosphonate therapy, could consider prolia (would require long term therapy) vs PTH-analog. Either of these would require transition with bisphosphonate one time dose  - For now, will check XR of spine (some wedging of L3, want to clarify if compression fracture as this would change recommendation)  - In addition, check for secondary causes of osteoporosis and adequate vitamin D level          10-year probability of a hip fracture ? 3% or a 10-year probability of a major osteoporosis-related fracture ? 20% warrants treatment.     No orders of the defined types were placed in this encounter.      Return to clinic: 1 year    A total of 53 minutes were spent today 09/14/22 on this visit including chart review, history and counseling, documentation and other activities as detailed above.   Answers for HPI/ROS submitted by the patient on 9/7/2022  General Symptoms: No  Skin Symptoms: No  HENT Symptoms: No  EYE SYMPTOMS: No  HEART SYMPTOMS: No  LUNG SYMPTOMS: No  INTESTINAL SYMPTOMS: Yes  URINARY SYMPTOMS: No  GYNECOLOGIC SYMPTOMS: Yes  BREAST  SYMPTOMS: No  SKELETAL SYMPTOMS: Yes  BLOOD SYMPTOMS: Yes  NERVOUS SYSTEM SYMPTOMS: No  MENTAL HEALTH SYMPTOMS: No  Heart burn or indigestion: No  Nausea: No  Vomiting: No  Abdominal pain: No  Bloating: No  Constipation: No  Diarrhea: No  Blood in stool: No  Black stools: No  Rectal or Anal pain: No  Fecal incontinence: No  Yellowing of skin or eyes: No  Vomit with blood: No  Change in stools: No  Bleeding or spotting between periods: No  Heavy or painful periods: No  Irregular periods: No  Vaginal discharge: Yes  Hot flashes: No  Vaginal dryness: Yes  Genital ulcers: No  Reduced libido: Yes  Painful intercourse: No  Difficulty with sexual arousal: Yes  Post-menopausal bleeding: Yes  Back pain: No  Muscle aches: Yes  Neck pain: Yes  Swollen joints: No  Joint pain: Yes  Bone pain: No  Muscle cramps: No  Muscle weakness: No  Joint stiffness: Yes  Bone fracture: No  Anemia: No  Swollen glands: No  Easy bleeding or bruising: Yes  Edema or swelling: No          Again, thank you for allowing me to participate in the care of your patient.        Sincerely,        Dena Joyner MD

## 2022-09-14 NOTE — PROGRESS NOTES
"Mone Humphreys is a 72 year old yo female who presents today for evaluation of osteopenia. She was referred by Dr Theodore, her primary care provider. Of note, she also has past medical history of Hypercholesterolemia, Glaucoma, antiphospholipid syndrome.    Chief Complaint   Patient presents with     Osteoporosis     Has history of gum disease, follows with periodontist, requires interval tissue grafts. Describes the disease as \"pockets\" that are under control. However, was told not to take bisphosphonates for this reason.      1) Osteoporosis  Diagnosis: 50s, routine dexa  Previous Fractures: never  Bone-specific therapy: Fosamax, taken off, Calcitonin nasal spray  Family History of Hip Fx: Father had stroke in 50s,  hip replacement, unclear why, R side paralyzed, doesn't remember a fall or anything  Hormone History (Menopause, Testosterone): 50s , had heavy menses (double uterus and APS)  Steroids: eye drops, joint injections every 3 months >10 years  PPIs: none  Antiepileptics: none  Anticoagulation: none- plavix  Autoimmune disease: Antiphospholipid Syndrome  CKD: none  Nephrolithiasis: none  Other risk factors: none   Ca/D: Becker Calcium Gummies 500mg BID, Vitamin D  25mg (1000u) BID--- Total 1000mg Ca, 2000u Vit D, Protein shake daily (? Ca supplement), eats cottage cheese or yogurt daily  Exercise: contemporary line dancing 2-3x per week, walk (outside or tredmill) daily 45min, many grandchildren and babysit every Friday, yardwork   Smoking: quit 1997  Alcohol: rare  Height loss- Tallest 5'4.5\", Current 5'2\"       Relevant Family History:    Active diagnoses this visit:  Osteopenia of hip, unspecified laterality     ROS: 10 point ROS neg other than the symptoms noted above in the HPI.      Objective:  BP (!) 165/89   Pulse 69   Wt 64.4 kg (141 lb 14.4 oz)   BMI 25.95 kg/m      Physical Exam   CONSTITUTIONAL: healthy, alert and NAD, responding appropriately  ENT: normocephalic, no visual evidence of " trauma, normal nose and oral mucosa  EYES: conjunctivae and sclerae normal, no exophthalmos or proptosis  THYROID:  no visualized nodules or goiter  LUNGS: no audible wheeze, cough or visible cyanosis, no visible retractions or increased work of breathing  EXTREMITIES: no hand tremors  NEUROLOGY: cranial nerves grossly intact with no obvious deficit.  SKIN:  no visualized skin lesions or rash, no edema visualized  PSYCH: mentation appears normal, normal judgement        No results found for: TSH, T4, FY1068, XZ6233, [id number], VITD25, ESTROGEN, TESTOSTERONE, PROLACTIN, LH, FSH, PTHI, 3506  Last Comprehensive Metabolic Panel:  Sodium   Date Value Ref Range Status   04/26/2022 140 133 - 144 mmol/L Final     Potassium   Date Value Ref Range Status   04/26/2022 4.4 3.4 - 5.3 mmol/L Final     Chloride   Date Value Ref Range Status   04/26/2022 107 94 - 109 mmol/L Final     Carbon Dioxide (CO2)   Date Value Ref Range Status   04/26/2022 29 20 - 32 mmol/L Final     Anion Gap   Date Value Ref Range Status   04/26/2022 4 3 - 14 mmol/L Final     Glucose   Date Value Ref Range Status   04/26/2022 95 70 - 99 mg/dL Final     Urea Nitrogen   Date Value Ref Range Status   04/26/2022 8 7 - 30 mg/dL Final     Creatinine   Date Value Ref Range Status   04/26/2022 0.60 0.52 - 1.04 mg/dL Final     GFR Estimate   Date Value Ref Range Status   04/26/2022 >90 >60 mL/min/1.73m2 Final     Comment:     Effective December 21, 2021 eGFRcr in adults is calculated using the 2021 CKD-EPI creatinine equation which includes age and gender (Jak et al., NEJ, DOI: 10.1056/HVZXze2023120)     Calcium   Date Value Ref Range Status   04/26/2022 9.6 8.5 - 10.1 mg/dL Final         Alkaline Phosphatase   Date Value Ref Range Status   03/01/2021 64 37 - 153 unit/L Final     Comment:     Lab test performed by:  Lab Mnemonic:[facility]  [address] [place] [address]  Aramis Hung M.D.  Unit of Measure:   U/L  QUEST  Specimen received date and time: 01-MAR-2021 13:26:00.00         DEXA 2019-     DEXA May 2022:            ASSESSMENT / PLAN:  (M85.859) Osteopenia of hip, unspecified laterality      1) Osteopenia with high frax score  - Of note has had significant decline (at least 4%) over all sites over past 3 years  - Has been on calcitonin for 15+ years, with likley waining effect  - FRAX score now into range with recommended therapy  - Would recommend 2-4 years bisphosphonate therapy ideally to improve BMD into better range and then have drug holiday. Will need to clarify underlying gum/dental disease with periodontist   - If unable to use bisphosphonate therapy, could consider prolia (would require long term therapy) vs PTH-analog. Either of these would require transition with bisphosphonate one time dose  - For now, will check XR of spine (some wedging of L3, want to clarify if compression fracture as this would change recommendation)  - In addition, check for secondary causes of osteoporosis and adequate vitamin D level          10-year probability of a hip fracture ? 3% or a 10-year probability of a major osteoporosis-related fracture ? 20% warrants treatment.     No orders of the defined types were placed in this encounter.      Return to clinic: 1 year    A total of 53 minutes were spent today 09/14/22 on this visit including chart review, history and counseling, documentation and other activities as detailed above.   Answers for HPI/ROS submitted by the patient on 9/7/2022  General Symptoms: No  Skin Symptoms: No  HENT Symptoms: No  EYE SYMPTOMS: No  HEART SYMPTOMS: No  LUNG SYMPTOMS: No  INTESTINAL SYMPTOMS: Yes  URINARY SYMPTOMS: No  GYNECOLOGIC SYMPTOMS: Yes  BREAST SYMPTOMS: No  SKELETAL SYMPTOMS: Yes  BLOOD SYMPTOMS: Yes  NERVOUS SYSTEM SYMPTOMS: No  MENTAL HEALTH SYMPTOMS: No  Heart burn or indigestion: No  Nausea: No  Vomiting: No  Abdominal pain: No  Bloating: No  Constipation: No  Diarrhea: No  Blood in stool:  No  Black stools: No  Rectal or Anal pain: No  Fecal incontinence: No  Yellowing of skin or eyes: No  Vomit with blood: No  Change in stools: No  Bleeding or spotting between periods: No  Heavy or painful periods: No  Irregular periods: No  Vaginal discharge: Yes  Hot flashes: No  Vaginal dryness: Yes  Genital ulcers: No  Reduced libido: Yes  Painful intercourse: No  Difficulty with sexual arousal: Yes  Post-menopausal bleeding: Yes  Back pain: No  Muscle aches: Yes  Neck pain: Yes  Swollen joints: No  Joint pain: Yes  Bone pain: No  Muscle cramps: No  Muscle weakness: No  Joint stiffness: Yes  Bone fracture: No  Anemia: No  Swollen glands: No  Easy bleeding or bruising: Yes  Edema or swelling: No

## 2022-09-21 ENCOUNTER — LAB (OUTPATIENT)
Dept: LAB | Facility: CLINIC | Age: 72
End: 2022-09-21
Payer: COMMERCIAL

## 2022-09-21 DIAGNOSIS — E83.59 OTHER DISORDERS OF CALCIUM METABOLISM: ICD-10-CM

## 2022-09-21 DIAGNOSIS — M85.859 OSTEOPENIA OF HIP, UNSPECIFIED LATERALITY: ICD-10-CM

## 2022-09-21 LAB
ALBUMIN SERPL BCG-MCNC: 4.7 G/DL (ref 3.5–5.2)
ALP SERPL-CCNC: 60 U/L (ref 35–104)
ALT SERPL W P-5'-P-CCNC: 19 U/L (ref 10–35)
ANION GAP SERPL CALCULATED.3IONS-SCNC: 6 MMOL/L (ref 7–15)
AST SERPL W P-5'-P-CCNC: 31 U/L (ref 10–35)
BILIRUB SERPL-MCNC: 0.4 MG/DL
BUN SERPL-MCNC: 8 MG/DL (ref 8–23)
CALCIUM SERPL-MCNC: 9.5 MG/DL (ref 8.8–10.2)
CHLORIDE SERPL-SCNC: 103 MMOL/L (ref 98–107)
CREAT SERPL-MCNC: 0.61 MG/DL (ref 0.51–0.95)
DEPRECATED CALCIDIOL+CALCIFEROL SERPL-MC: 56 UG/L (ref 20–75)
DEPRECATED HCO3 PLAS-SCNC: 28 MMOL/L (ref 22–29)
GFR SERPL CREATININE-BSD FRML MDRD: >90 ML/MIN/1.73M2
GLUCOSE SERPL-MCNC: 83 MG/DL (ref 70–99)
POTASSIUM SERPL-SCNC: 4 MMOL/L (ref 3.4–5.3)
PROT SERPL-MCNC: 7.1 G/DL (ref 6.4–8.3)
PTH-INTACT SERPL-MCNC: 41 PG/ML (ref 15–65)
SODIUM SERPL-SCNC: 137 MMOL/L (ref 136–145)
TOTAL PROTEIN SERUM FOR ELP: 6.3 G/DL (ref 6.4–8.3)
TSH SERPL DL<=0.005 MIU/L-ACNC: 1.57 UIU/ML (ref 0.3–4.2)

## 2022-09-21 PROCEDURE — 84165 PROTEIN E-PHORESIS SERUM: CPT | Performed by: PATHOLOGY

## 2022-09-21 PROCEDURE — 36415 COLL VENOUS BLD VENIPUNCTURE: CPT

## 2022-09-21 PROCEDURE — 84155 ASSAY OF PROTEIN SERUM: CPT | Mod: 59

## 2022-09-21 PROCEDURE — 82523 COLLAGEN CROSSLINKS: CPT | Mod: 90

## 2022-09-21 PROCEDURE — 83970 ASSAY OF PARATHORMONE: CPT

## 2022-09-21 PROCEDURE — 83519 RIA NONANTIBODY: CPT | Mod: 90

## 2022-09-21 PROCEDURE — 80053 COMPREHEN METABOLIC PANEL: CPT

## 2022-09-21 PROCEDURE — 82306 VITAMIN D 25 HYDROXY: CPT

## 2022-09-21 PROCEDURE — 84443 ASSAY THYROID STIM HORMONE: CPT

## 2022-09-21 PROCEDURE — 99000 SPECIMEN HANDLING OFFICE-LAB: CPT

## 2022-09-22 LAB
ALBUMIN SERPL ELPH-MCNC: 4.1 G/DL (ref 3.7–5.1)
ALPHA1 GLOB SERPL ELPH-MCNC: 0.2 G/DL (ref 0.2–0.4)
ALPHA2 GLOB SERPL ELPH-MCNC: 0.6 G/DL (ref 0.5–0.9)
B-GLOBULIN SERPL ELPH-MCNC: 0.6 G/DL (ref 0.6–1)
GAMMA GLOB SERPL ELPH-MCNC: 0.8 G/DL (ref 0.7–1.6)
M PROTEIN SERPL ELPH-MCNC: 0 G/DL
PROT PATTERN SERPL ELPH-IMP: NORMAL

## 2022-09-23 LAB — PINP SER-MCNC: 64 UG/L

## 2022-09-23 NOTE — RESULT ENCOUNTER NOTE
Dear Mone,     Here are your recent results which are within the expected range. Any additonal thoughts on bone therapy/word from your periodontist? Please continue with your current plan of care and let us know if you have any questions or concerns.    Regards,  Dena Joyner MD

## 2022-09-24 LAB — COLLAGEN CTX SERPL-MCNC: 551 PG/ML

## 2022-10-03 ENCOUNTER — HEALTH MAINTENANCE LETTER (OUTPATIENT)
Age: 72
End: 2022-10-03

## 2022-10-27 ENCOUNTER — TELEPHONE (OUTPATIENT)
Dept: ENDOCRINOLOGY | Facility: CLINIC | Age: 72
End: 2022-10-27

## 2022-10-27 NOTE — TELEPHONE ENCOUNTER
[facility] Call Center    Phone Message    May a detailed message be left on voicemail: yes     Reason for Call: Other: Pt wondering if Dr Joyner ever got connect with pts periodontist Dr Brandt in regards to medications. Please advise. Thank you     Action Taken: Message routed to:  Other: endo    Travel Screening: Not Applicable

## 2022-10-31 ENCOUNTER — MYC MEDICAL ADVICE (OUTPATIENT)
Dept: ENDOCRINOLOGY | Facility: CLINIC | Age: 72
End: 2022-10-31

## 2022-10-31 DIAGNOSIS — M81.8 OTHER OSTEOPOROSIS WITHOUT CURRENT PATHOLOGICAL FRACTURE: Primary | ICD-10-CM

## 2022-10-31 NOTE — TELEPHONE ENCOUNTER
Called pt, and she is ok with taking whatever medication provider recommends (but she really really does not want to do Fosamax).  Mercy Dean RN

## 2022-11-01 DIAGNOSIS — M81.8 OTHER OSTEOPOROSIS WITHOUT CURRENT PATHOLOGICAL FRACTURE: ICD-10-CM

## 2022-11-01 RX ORDER — ZOLEDRONIC ACID 5 MG/100ML
5 INJECTION, SOLUTION INTRAVENOUS ONCE
Status: CANCELLED
Start: 2022-11-01

## 2022-11-01 RX ORDER — ALBUTEROL SULFATE 90 UG/1
1-2 AEROSOL, METERED RESPIRATORY (INHALATION)
Status: CANCELLED
Start: 2022-11-01

## 2022-11-01 RX ORDER — HEPARIN SODIUM,PORCINE 10 UNIT/ML
5 VIAL (ML) INTRAVENOUS
Status: CANCELLED | OUTPATIENT
Start: 2022-11-01

## 2022-11-01 RX ORDER — DIPHENHYDRAMINE HYDROCHLORIDE 50 MG/ML
50 INJECTION INTRAMUSCULAR; INTRAVENOUS
Status: CANCELLED
Start: 2022-11-01

## 2022-11-01 RX ORDER — EPINEPHRINE 1 MG/ML
0.3 INJECTION, SOLUTION, CONCENTRATE INTRAVENOUS EVERY 5 MIN PRN
Status: CANCELLED | OUTPATIENT
Start: 2022-11-01

## 2022-11-01 RX ORDER — ALBUTEROL SULFATE 0.83 MG/ML
2.5 SOLUTION RESPIRATORY (INHALATION)
Status: CANCELLED | OUTPATIENT
Start: 2022-11-01

## 2022-11-01 RX ORDER — MEPERIDINE HYDROCHLORIDE 25 MG/ML
25 INJECTION INTRAMUSCULAR; INTRAVENOUS; SUBCUTANEOUS EVERY 30 MIN PRN
Status: CANCELLED | OUTPATIENT
Start: 2022-11-01

## 2022-11-01 RX ORDER — HEPARIN SODIUM (PORCINE) LOCK FLUSH IV SOLN 100 UNIT/ML 100 UNIT/ML
5 SOLUTION INTRAVENOUS
Status: CANCELLED | OUTPATIENT
Start: 2022-11-01

## 2022-11-01 RX ORDER — ACETAMINOPHEN 325 MG/1
325 TABLET ORAL ONCE
Status: CANCELLED | OUTPATIENT
Start: 2022-11-01

## 2022-11-01 RX ORDER — METHYLPREDNISOLONE SODIUM SUCCINATE 125 MG/2ML
125 INJECTION, POWDER, LYOPHILIZED, FOR SOLUTION INTRAMUSCULAR; INTRAVENOUS
Status: CANCELLED
Start: 2022-11-01

## 2022-11-03 NOTE — TELEPHONE ENCOUNTER
Placed order for reclast. Patient needs to schedule     Infusion Centers    [facility]  [address] [place] [address]  [phone] [facility]  [facility]  [address]  [place] [address]  [phone]   [facility]  [facility] [address]  [address] [place] [address]  [phone]    [facility]  [address], [address]  [place]  [address]  [phone] [facility]  [facility]  [address]  [place] [address]  [phone]     [facility]  [facility]  [address], [address]  [place], [place] [address] [phone]

## 2022-12-02 ENCOUNTER — E-VISIT (OUTPATIENT)
Dept: FAMILY MEDICINE | Facility: CLINIC | Age: 72
End: 2022-12-02
Payer: COMMERCIAL

## 2022-12-02 DIAGNOSIS — B96.89 ACUTE BACTERIAL SINUSITIS: Primary | ICD-10-CM

## 2022-12-02 DIAGNOSIS — J01.90 ACUTE BACTERIAL SINUSITIS: Primary | ICD-10-CM

## 2022-12-02 PROCEDURE — 99421 OL DIG E/M SVC 5-10 MIN: CPT | Performed by: FAMILY MEDICINE

## 2022-12-02 NOTE — PATIENT INSTRUCTIONS
Dear Mone Humphreys    After reviewing your responses, I've been able to diagnose you with acute sinusitis. Antibiotics were sent in to the pharmacy. There will be no reason that you would have to postpone the reclast.      Based on your responses and diagnosis, I have prescribed No orders of the defined types were placed in this encounter.   to treat your symptoms. I have sent this to your pharmacy.?     It is also important to stay well hydrated, get lots of rest and take over-the-counter decongestants,?tylenol?or ibuprofen if you?are able to?take those medications per your primary care provider to help relieve discomfort.?     It is important that you take?all of?your prescribed medication even if your symptoms are improving after a few doses.? Taking?all of?your medicine helps prevent the symptoms from returning.?     If your symptoms worsen, you develop severe headache, vomiting, high fever (>102), or are not improving in 7 days, please contact your primary care provider for an appointment or visit any of our convenient Walk-in Care or Urgent Care Centers to be seen which can be found on our website?here.?     Thanks again for choosing?us?as your health care partner,?   ?  Alexus Conti MD?

## 2022-12-06 ENCOUNTER — INFUSION THERAPY VISIT (OUTPATIENT)
Dept: INFUSION THERAPY | Facility: CLINIC | Age: 72
End: 2022-12-06
Attending: INTERNAL MEDICINE
Payer: MEDICARE

## 2022-12-06 VITALS
TEMPERATURE: 98.2 F | SYSTOLIC BLOOD PRESSURE: 135 MMHG | DIASTOLIC BLOOD PRESSURE: 83 MMHG | OXYGEN SATURATION: 96 % | HEART RATE: 66 BPM | RESPIRATION RATE: 18 BRPM

## 2022-12-06 DIAGNOSIS — M81.8 OTHER OSTEOPOROSIS WITHOUT CURRENT PATHOLOGICAL FRACTURE: Primary | ICD-10-CM

## 2022-12-06 LAB
CALCIUM SERPL-MCNC: 8.6 MG/DL (ref 8.5–10.5)
CREAT SERPL-MCNC: 0.56 MG/DL (ref 0.6–1.1)
GFR SERPL CREATININE-BSD FRML MDRD: >90 ML/MIN/1.73M2

## 2022-12-06 PROCEDURE — 250N000011 HC RX IP 250 OP 636: Performed by: INTERNAL MEDICINE

## 2022-12-06 PROCEDURE — 36415 COLL VENOUS BLD VENIPUNCTURE: CPT | Performed by: INTERNAL MEDICINE

## 2022-12-06 PROCEDURE — 96365 THER/PROPH/DIAG IV INF INIT: CPT

## 2022-12-06 PROCEDURE — 82565 ASSAY OF CREATININE: CPT | Performed by: INTERNAL MEDICINE

## 2022-12-06 PROCEDURE — 250N000013 HC RX MED GY IP 250 OP 250 PS 637: Performed by: INTERNAL MEDICINE

## 2022-12-06 PROCEDURE — 82310 ASSAY OF CALCIUM: CPT | Performed by: INTERNAL MEDICINE

## 2022-12-06 PROCEDURE — 258N000003 HC RX IP 258 OP 636: Performed by: INTERNAL MEDICINE

## 2022-12-06 RX ORDER — ALBUTEROL SULFATE 0.83 MG/ML
2.5 SOLUTION RESPIRATORY (INHALATION)
Status: CANCELLED | OUTPATIENT
Start: 2022-12-06

## 2022-12-06 RX ORDER — DIPHENHYDRAMINE HYDROCHLORIDE 50 MG/ML
50 INJECTION INTRAMUSCULAR; INTRAVENOUS
Status: DISCONTINUED | OUTPATIENT
Start: 2022-12-06 | End: 2022-12-06 | Stop reason: HOSPADM

## 2022-12-06 RX ORDER — DIPHENHYDRAMINE HYDROCHLORIDE 50 MG/ML
50 INJECTION INTRAMUSCULAR; INTRAVENOUS
Status: CANCELLED
Start: 2022-12-06

## 2022-12-06 RX ORDER — ALBUTEROL SULFATE 0.83 MG/ML
2.5 SOLUTION RESPIRATORY (INHALATION)
Status: DISCONTINUED | OUTPATIENT
Start: 2022-12-06 | End: 2022-12-06 | Stop reason: HOSPADM

## 2022-12-06 RX ORDER — METHYLPREDNISOLONE SODIUM SUCCINATE 125 MG/2ML
125 INJECTION, POWDER, LYOPHILIZED, FOR SOLUTION INTRAMUSCULAR; INTRAVENOUS
Status: DISCONTINUED | OUTPATIENT
Start: 2022-12-06 | End: 2022-12-06 | Stop reason: HOSPADM

## 2022-12-06 RX ORDER — ACETAMINOPHEN 325 MG/1
325 TABLET ORAL ONCE
Status: CANCELLED | OUTPATIENT
Start: 2022-12-06

## 2022-12-06 RX ORDER — HEPARIN SODIUM,PORCINE 10 UNIT/ML
5 VIAL (ML) INTRAVENOUS
Status: CANCELLED | OUTPATIENT
Start: 2022-12-06

## 2022-12-06 RX ORDER — ALBUTEROL SULFATE 90 UG/1
1-2 AEROSOL, METERED RESPIRATORY (INHALATION)
Status: DISCONTINUED | OUTPATIENT
Start: 2022-12-06 | End: 2022-12-06 | Stop reason: HOSPADM

## 2022-12-06 RX ORDER — HEPARIN SODIUM (PORCINE) LOCK FLUSH IV SOLN 100 UNIT/ML 100 UNIT/ML
5 SOLUTION INTRAVENOUS
Status: CANCELLED | OUTPATIENT
Start: 2022-12-06

## 2022-12-06 RX ORDER — METHYLPREDNISOLONE SODIUM SUCCINATE 125 MG/2ML
125 INJECTION, POWDER, LYOPHILIZED, FOR SOLUTION INTRAMUSCULAR; INTRAVENOUS
Status: CANCELLED
Start: 2022-12-06

## 2022-12-06 RX ORDER — ALBUTEROL SULFATE 90 UG/1
1-2 AEROSOL, METERED RESPIRATORY (INHALATION)
Status: CANCELLED
Start: 2022-12-06

## 2022-12-06 RX ORDER — MEPERIDINE HYDROCHLORIDE 50 MG/ML
25 INJECTION INTRAMUSCULAR; INTRAVENOUS; SUBCUTANEOUS EVERY 30 MIN PRN
Status: DISCONTINUED | OUTPATIENT
Start: 2022-12-06 | End: 2022-12-06 | Stop reason: HOSPADM

## 2022-12-06 RX ORDER — ZOLEDRONIC ACID 5 MG/100ML
5 INJECTION, SOLUTION INTRAVENOUS ONCE
Status: CANCELLED
Start: 2022-12-06

## 2022-12-06 RX ORDER — EPINEPHRINE 1 MG/ML
0.3 INJECTION, SOLUTION INTRAMUSCULAR; SUBCUTANEOUS EVERY 5 MIN PRN
Status: DISCONTINUED | OUTPATIENT
Start: 2022-12-06 | End: 2022-12-06 | Stop reason: HOSPADM

## 2022-12-06 RX ORDER — MEPERIDINE HYDROCHLORIDE 50 MG/ML
25 INJECTION INTRAMUSCULAR; INTRAVENOUS; SUBCUTANEOUS EVERY 30 MIN PRN
Status: CANCELLED | OUTPATIENT
Start: 2022-12-06

## 2022-12-06 RX ORDER — EPINEPHRINE 1 MG/ML
0.3 INJECTION, SOLUTION INTRAMUSCULAR; SUBCUTANEOUS EVERY 5 MIN PRN
Status: CANCELLED | OUTPATIENT
Start: 2022-12-06

## 2022-12-06 RX ORDER — ZOLEDRONIC ACID 5 MG/100ML
5 INJECTION, SOLUTION INTRAVENOUS ONCE
Status: COMPLETED | OUTPATIENT
Start: 2022-12-06 | End: 2022-12-06

## 2022-12-06 RX ORDER — ACETAMINOPHEN 325 MG/1
325 TABLET ORAL ONCE
Status: COMPLETED | OUTPATIENT
Start: 2022-12-06 | End: 2022-12-06

## 2022-12-06 RX ADMIN — SODIUM CHLORIDE 250 ML: 9 INJECTION, SOLUTION INTRAVENOUS at 13:58

## 2022-12-06 RX ADMIN — ACETAMINOPHEN 325 MG: 325 TABLET ORAL at 13:55

## 2022-12-06 RX ADMIN — ZOLEDRONIC ACID 5 MG: 0.05 INJECTION, SOLUTION INTRAVENOUS at 14:05

## 2022-12-06 NOTE — PROGRESS NOTES
Infusion Nursing Note:  Mone Humphreys presents today for Reclast (first).    Patient seen by provider today: No    Note: Pt arrives ambulatory to [facility]. Pt reports having never had reclast. Discussed process of today's visit, encouraged drinking increased amount of water today and tomorrow, and discussed potential reactions and when to notify nursing staff during infusion.    + Pt reports that she is taking calcium/vitamin D.    /83 (BP Location: Right arm, Patient Position: Sitting, Cuff Size: Adult Regular)   Pulse 66   Temp 98.2  F (36.8  C) (Oral)   Resp 18   SpO2 96%      + Pt premedicated with tylenol per therapy plan.    Intravenous Access:  Peripheral IV placed in right forearm.    Treatment Conditions:  Lab Results   Component Value Date     09/21/2022    POTASSIUM 4.0 09/21/2022    CR 0.56 (L) 12/06/2022    NICOLAS 8.6 12/06/2022    BILITOTAL 0.4 09/21/2022    ALBUMIN 4.7 09/21/2022    ALT 19 09/21/2022    AST 31 09/21/2022     Per therapy plan parameters, proceeding with reclast infusion.    Post Infusion Assessment:  Patient tolerated infusion without incident.    No evidence of extravasations.  Access discontinued per protocol.     Discharge Plan:   Pt given AVS, including information on Reclast.  Patient discharged in stable condition accompanied by: self.  Departure Mode: Ambulatory.      Lucy Reyna RN

## 2023-03-15 ENCOUNTER — ANCILLARY PROCEDURE (OUTPATIENT)
Dept: GENERAL RADIOLOGY | Facility: CLINIC | Age: 73
End: 2023-03-15
Attending: FAMILY MEDICINE
Payer: COMMERCIAL

## 2023-03-15 ENCOUNTER — OFFICE VISIT (OUTPATIENT)
Dept: FAMILY MEDICINE | Facility: CLINIC | Age: 73
End: 2023-03-15
Payer: COMMERCIAL

## 2023-03-15 VITALS
TEMPERATURE: 97.6 F | RESPIRATION RATE: 16 BRPM | SYSTOLIC BLOOD PRESSURE: 136 MMHG | OXYGEN SATURATION: 99 % | HEART RATE: 74 BPM | DIASTOLIC BLOOD PRESSURE: 88 MMHG

## 2023-03-15 DIAGNOSIS — M79.672 PAIN OF LEFT MIDFOOT: Primary | ICD-10-CM

## 2023-03-15 DIAGNOSIS — M79.672 PAIN OF LEFT MIDFOOT: ICD-10-CM

## 2023-03-15 PROCEDURE — 99213 OFFICE O/P EST LOW 20 MIN: CPT | Performed by: FAMILY MEDICINE

## 2023-03-15 PROCEDURE — 73630 X-RAY EXAM OF FOOT: CPT | Mod: TC | Performed by: RADIOLOGY

## 2023-03-15 ASSESSMENT — PAIN SCALES - GENERAL: PAINLEVEL: NO PAIN (0)

## 2023-03-15 NOTE — PROGRESS NOTES
Assessment & Plan     Pain of left midfoot  Symptoms are healing, no evidence of fracture on x-ray by my read, continue supportive care, follow-up if symptoms are worsening again  - XR Foot Left G/E 3 Views; Future      Patient will monitor blood pressures at home and let us know if elevated               No follow-ups on file.    Alexus Conti MD  [facility]    Brianda Shore is a 72 year old, presenting for the following health issues:  Foot Pain (Lump on top of left foot x3 months. No known injury.)    Symptoms started about 3 months ago but if slowly getting better.  Does not feel much of a lump anymore.  Pain is improving.  Primarily noticed pain when she first got up and walked.  Always on the top of her foot.  No plantar pain.  No known injury.  Concerned because she would like to proceed with knee replacement later this year and does not want to get in the way    History of Present Illness       Reason for visit:  Bump on my foot - bruised?  Symptom onset:  More than a month  Symptoms include:  Pain when I walk after sitting a certain way.  Symptom intensity:  Moderate  Symptom progression:  Improving  Had these symptoms before:  No  What makes it worse:  Unsure  What makes it better:  Walking until the pain stops    She eats 4 or more servings of fruits and vegetables daily.She consumes 0 sweetened beverage(s) daily.She exercises with enough effort to increase her heart rate 30 to 60 minutes per day.  She exercises with enough effort to increase her heart rate 5 days per week.   She is taking medications regularly.             Review of Systems         Objective    /88   Pulse 74   Temp 97.6  F (36.4  C) (Tympanic)   Resp 16   LMP  (LMP Unknown)   SpO2 99%   There is no height or weight on file to calculate BMI.  Physical Exam   Alert cooperative in no acute distress  On examination of her foot there is some mild tenderness across the top of the foot but  no evidence of any deformity and no bony tenderness    X-ray of the foot read by me shows no evidence of fracture, degenerative change is seen.  Also has significant bunion deformity which was previously known.

## 2023-04-19 ENCOUNTER — OFFICE VISIT (OUTPATIENT)
Dept: FAMILY MEDICINE | Facility: CLINIC | Age: 73
End: 2023-04-19
Payer: COMMERCIAL

## 2023-04-19 VITALS
RESPIRATION RATE: 16 BRPM | HEIGHT: 61 IN | SYSTOLIC BLOOD PRESSURE: 142 MMHG | BODY MASS INDEX: 28.32 KG/M2 | WEIGHT: 150 LBS | OXYGEN SATURATION: 99 % | DIASTOLIC BLOOD PRESSURE: 80 MMHG | TEMPERATURE: 97.1 F | HEART RATE: 66 BPM

## 2023-04-19 DIAGNOSIS — D68.61 ANTICARDIOLIPIN SYNDROME (H): ICD-10-CM

## 2023-04-19 DIAGNOSIS — M17.12 PRIMARY OSTEOARTHRITIS OF LEFT KNEE: Primary | ICD-10-CM

## 2023-04-19 DIAGNOSIS — Z01.818 PREOP GENERAL PHYSICAL EXAM: ICD-10-CM

## 2023-04-19 LAB
ANION GAP SERPL CALCULATED.3IONS-SCNC: 10 MMOL/L (ref 7–15)
BASOPHILS # BLD AUTO: 0.1 10E3/UL (ref 0–0.2)
BASOPHILS NFR BLD AUTO: 1 %
BUN SERPL-MCNC: 9.3 MG/DL (ref 8–23)
CALCIUM SERPL-MCNC: 10 MG/DL (ref 8.8–10.2)
CHLORIDE SERPL-SCNC: 100 MMOL/L (ref 98–107)
CREAT SERPL-MCNC: 0.65 MG/DL (ref 0.51–0.95)
DEPRECATED HCO3 PLAS-SCNC: 26 MMOL/L (ref 22–29)
EOSINOPHIL # BLD AUTO: 0.2 10E3/UL (ref 0–0.7)
EOSINOPHIL NFR BLD AUTO: 4 %
ERYTHROCYTE [DISTWIDTH] IN BLOOD BY AUTOMATED COUNT: 12.6 % (ref 10–15)
GFR SERPL CREATININE-BSD FRML MDRD: >90 ML/MIN/1.73M2
GLUCOSE SERPL-MCNC: 87 MG/DL (ref 70–99)
HCT VFR BLD AUTO: 40.8 % (ref 35–47)
HGB BLD-MCNC: 13.5 G/DL (ref 11.7–15.7)
IMM GRANULOCYTES # BLD: 0 10E3/UL
IMM GRANULOCYTES NFR BLD: 0 %
LYMPHOCYTES # BLD AUTO: 1.9 10E3/UL (ref 0.8–5.3)
LYMPHOCYTES NFR BLD AUTO: 37 %
MCH RBC QN AUTO: 30.9 PG (ref 26.5–33)
MCHC RBC AUTO-ENTMCNC: 33.1 G/DL (ref 31.5–36.5)
MCV RBC AUTO: 93 FL (ref 78–100)
MONOCYTES # BLD AUTO: 0.5 10E3/UL (ref 0–1.3)
MONOCYTES NFR BLD AUTO: 9 %
NEUTROPHILS # BLD AUTO: 2.5 10E3/UL (ref 1.6–8.3)
NEUTROPHILS NFR BLD AUTO: 49 %
PLATELET # BLD AUTO: 339 10E3/UL (ref 150–450)
POTASSIUM SERPL-SCNC: 4.3 MMOL/L (ref 3.4–5.3)
RBC # BLD AUTO: 4.37 10E6/UL (ref 3.8–5.2)
SODIUM SERPL-SCNC: 136 MMOL/L (ref 136–145)
WBC # BLD AUTO: 5.1 10E3/UL (ref 4–11)

## 2023-04-19 PROCEDURE — 99214 OFFICE O/P EST MOD 30 MIN: CPT | Performed by: FAMILY MEDICINE

## 2023-04-19 PROCEDURE — 36415 COLL VENOUS BLD VENIPUNCTURE: CPT | Performed by: FAMILY MEDICINE

## 2023-04-19 PROCEDURE — 93000 ELECTROCARDIOGRAM COMPLETE: CPT | Performed by: FAMILY MEDICINE

## 2023-04-19 PROCEDURE — 85025 COMPLETE CBC W/AUTO DIFF WBC: CPT | Mod: QW | Performed by: FAMILY MEDICINE

## 2023-04-19 PROCEDURE — 80048 BASIC METABOLIC PNL TOTAL CA: CPT | Performed by: FAMILY MEDICINE

## 2023-04-19 ASSESSMENT — ENCOUNTER SYMPTOMS
EYES NEGATIVE: 1
CARDIOVASCULAR NEGATIVE: 1
RESPIRATORY NEGATIVE: 1
NEUROLOGICAL NEGATIVE: 1
PSYCHIATRIC NEGATIVE: 1
CONSTITUTIONAL NEGATIVE: 1
GASTROINTESTINAL NEGATIVE: 1
ENDOCRINE NEGATIVE: 1

## 2023-04-19 ASSESSMENT — PAIN SCALES - GENERAL: PAINLEVEL: NO PAIN (0)

## 2023-04-19 NOTE — PROGRESS NOTES
[address] [address]  Phone: [phone]  Fax: [phone]  Primary Provider: Alexus Conti  Pre-op Performing Provider: ALEXUS CONTI      PREOPERATIVE EVALUATION:  Today's date: 4/19/2023    Mone Humphreys is a 73 year old female who presents for a preoperative evaluation.      4/19/2023     3:15 PM   Additional Questions   Roomed by SRud   Accompanied by n/a     Surgical Information:  Surgery/Procedure: Left Total Knee Arthroplasty  Surgery Location: [facility]  Surgeon: Ant  Surgery Date: 05/02/2023  Time of Surgery: TBD  Where patient plans to recover: At home with family  Fax number for surgical facility: Note does not need to be faxed, will be available electronically in Epic.    Assessment & Plan     The proposed surgical procedure is considered INTERMEDIATE risk.    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  Has been stable on Plavix.  Okay to hold Plavix for 5 days prior.  If needed to be extended to 7 days held her orthopedic physician could request that.  Labs are pending.  Blood pressure borderline elevated in clinic today but she has been monitoring at home and is consistently systolic 120s and diastolic 70s.       Risks and Recommendations:  The patient has the following additional risks and recommendations for perioperative complications:   - No identified additional risk factors other than previously addressed    Medication Instructions:   - clopidrogel (Plavix), prasugrel (Effient), ticagrelor (Brilinta): No contraindication to stopping Plavix, HOLD 5-7 days before surgery.     RECOMMENDATION:  APPROVAL GIVEN to proceed with proposed procedure, without further diagnostic evaluation.            Subjective     HPI related to  upcoming procedure: patient with chronic left knee pain, scheduled for total knee replacement        2023     3:49 PM   Preop Questions   1. Have you ever had a heart attack or stroke? YES - had MRI that showed incidental very small old strokes at the time of anticardiolipin antibody syndrome diagnosis   2. Have you ever had surgery on your heart or blood vessels, such as a stent placement, a coronary artery bypass, or surgery on an artery in your head, neck, heart, or legs? No   3. Do you have chest pain with activity? No   4. Do you have a history of  heart failure? No   5. Do you currently have a cold, bronchitis or symptoms of other infection? No   6. Do you have a cough, shortness of breath, or wheezing? No   7. Do you or anyone in your family have previous history of blood clots? YES - anticardiolipin antibody positive   8. Do you or does anyone in your family have a serious bleeding problem such as prolonged bleeding following surgeries or cuts? No   9. Have you ever had problems with anemia or been told to take iron pills? YES - patient had iron deficiency as a young child, nothing as adult   10. Have you had any abnormal blood loss such as black, tarry or bloody stools, or abnormal vaginal bleeding? No   11. Have you ever had a blood transfusion? YES - at time of    11a. Have you ever had a transfusion reaction? No   12. Are you willing to have a blood transfusion if it is medically needed before, during, or after your surgery? Yes   13. Have you or any of your relatives ever had problems with anesthesia? YES - patient gets severe nausea   14. Do you have sleep apnea, excessive snoring or daytime drowsiness? No   15. Do you have any artifical heart valves or other implanted medical devices like a pacemaker, defibrillator, or continuous glucose monitor? No   16. Do you have artificial joints? Has plate in foot from bunion surgery   17. Are you allergic to latex? No       Health Care  Directive:  Patient does not have a Health Care Directive or Living Will: Discussed advance care planning with patient; information given to patient to review.    Preoperative Review of :   reviewed - no record of controlled substances prescribed.      Status of Chronic Conditions:  On lifetime Plavix for anticardiolipin antibody syndrome    Review of Systems   Constitutional: Negative.    HENT: Negative.    Eyes: Negative.    Respiratory: Negative.    Cardiovascular: Negative.    Gastrointestinal: Negative.    Endocrine: Negative.    Genitourinary: Negative.    Skin: Negative.    Neurological: Negative.    Psychiatric/Behavioral: Negative.          Patient Active Problem List    Diagnosis Date Noted     Anticardiolipin syndrome (H) 2023     Priority: Medium     Other osteoporosis without current pathological fracture 2022     Priority: Medium     Allergic rhinitis 2022     Priority: Medium     Familial hypercholesterolemia 2022     Priority: Medium     Osteopenia 2022     Priority: Medium     Primary angle-closure glaucoma 2022     Priority: Medium      Past Medical History:   Diagnosis Date     Arthritis ?    I m 72!     Cerebral infarction (H) Early  s    Small strokes noted in MRI     History of blood transfusion 1970    2 C-sections     Past Surgical History:   Procedure Laterality Date     APPENDECTOMY  1974    done electively at time of      BUNIONECTOMY Right 2000      SECTION  1974      SECTION  1978     COLONOSCOPY  2016     EYE SURGERY  2018    Opened drainage holes     GYN SURGERY  1970 s, 2005    2 C-sections, hysterectomy     LAPAROSCOPIC ASSISTED HYSTERECTOMY VAGINAL, BILATERAL SALPINGO-OOPHORECTOMY, COMBINED       ORTHOPEDIC SURGERY  May, 2000    Bunionectomy     TONSILLECTOMY & ADENOIDECTOMY       Current Outpatient Medications   Medication Sig Dispense Refill     clopidogrel (PLAVIX) 75 MG tablet Take 1 tablet (75  "mg) by mouth daily 90 tablet 3     rosuvastatin (CRESTOR) 10 MG tablet TAKE 1 TABLET(10 MG) BY MOUTH AT BEDTIME 90 tablet 2     triamcinolone (KENALOG) 0.1 % external ointment Apply topically 3 times daily as needed 60 g 3       Allergies   Allergen Reactions     Atorvastatin Muscle Pain (Myalgia)     Lisinopril Cough     Nickel Rash        Social History     Tobacco Use     Smoking status: Former     Packs/day: 2.00     Years: 10.00     Pack years: 20.00     Types: Cigarettes     Start date: 1966     Quit date: 1976     Years since quittin.2     Smokeless tobacco: Never   Vaping Use     Vaping status: Never Used   Substance Use Topics     Alcohol use: Yes     Comment: A few drinks a year     Family History   Problem Relation Age of Onset     Lung Cancer Mother      Kidney Disease Mother      Other Cancer Mother      Cerebrovascular Disease Father      Hypertension Father      Other - See Comments Sister         antiphospholipid antibody syndrome     Endometriosis Sister      Depression Brother      No Known Problems Brother      History   Drug Use Unknown         Objective     BP (!) 169/87 (BP Location: Right arm, Patient Position: Sitting)   Pulse 66   Temp 97.1  F (36.2  C) (Tympanic)   Resp 16   Ht 1.556 m (5' 1.25\")   Wt 68 kg (150 lb)   LMP  (LMP Unknown)   SpO2 99%   BMI 28.11 kg/m      Physical Exam    GENERAL APPEARANCE: healthy, alert and no distress     EYES: EOMI, PERRL     HENT: ear canals and TM's normal and nose and mouth without ulcers or lesions     NECK: no adenopathy, no asymmetry, masses, or scars and thyroid normal to palpation     RESP: lungs clear to auscultation - no rales, rhonchi or wheezes     CV: regular rates and rhythm, normal S1 S2, no S3 or S4 and no murmur, click or rub     ABDOMEN:  soft, nontender, no HSM or masses and bowel sounds normal     MS: extremities normal- no gross deformities noted, no evidence of inflammation in joints, FROM in all extremities.    "  SKIN: no suspicious lesions or rashes     NEURO: Normal strength and tone, sensory exam grossly normal, mentation intact and speech normal     PSYCH: mentation appears normal. and affect normal/bright     LYMPHATICS: No cervical adenopathy    Recent Labs   Lab Test 12/06/22  1306 09/21/22  0820 04/26/22  0720   NA  --  137 140   POTASSIUM  --  4.0 4.4   CR 0.56* 0.61 0.60        Diagnostics:  Labs pending at this time.  Results will be reviewed when available.   EKG: appears normal, NSR, normal axis, normal intervals, no acute ST/T changes c/w ischemia, no LVH by voltage criteria, unchanged from previous tracings    Revised Cardiac Risk Index (RCRI):  The patient has the following serious cardiovascular risks for perioperative complications:   - No serious cardiac risks = 0 points     RCRI Interpretation: 0 points: Class I (very low risk - 0.4% complication rate)           Signed Electronically by: Alexus Conti MD  Copy of this evaluation report is provided to requesting physician.

## 2023-04-27 DIAGNOSIS — R76.0 ANTICARDIOLIPIN ANTIBODY POSITIVE: ICD-10-CM

## 2023-04-28 RX ORDER — CLOPIDOGREL BISULFATE 75 MG/1
TABLET ORAL
Qty: 90 TABLET | Refills: 0 | Status: SHIPPED | OUTPATIENT
Start: 2023-04-28 | End: 2023-07-25

## 2023-04-28 NOTE — TELEPHONE ENCOUNTER
Prescription approved per [facility] Refill Protocol.  Jessy JACKSON RN  [facility]

## 2023-07-25 DIAGNOSIS — R76.0 ANTICARDIOLIPIN ANTIBODY POSITIVE: ICD-10-CM

## 2023-07-25 DIAGNOSIS — E78.01 FAMILIAL HYPERCHOLESTEROLEMIA: ICD-10-CM

## 2023-07-25 RX ORDER — CLOPIDOGREL BISULFATE 75 MG/1
TABLET ORAL
Qty: 90 TABLET | Refills: 0 | Status: SHIPPED | OUTPATIENT
Start: 2023-07-25 | End: 2023-07-30

## 2023-07-25 RX ORDER — ROSUVASTATIN CALCIUM 10 MG/1
TABLET, COATED ORAL
Qty: 90 TABLET | Refills: 0 | Status: SHIPPED | OUTPATIENT
Start: 2023-07-25 | End: 2023-09-07

## 2023-07-25 NOTE — TELEPHONE ENCOUNTER
"Routing refill request to provider for review/approval because:    rosuvastatin (CRESTOR)   Last Written Prescription Date:  8/1/22  Last Fill Quantity: 90,  # refills: 2   -FAIL: LDL not current    clopidogrel (PLAVIX)   Last Written Prescription Date:  4/28/23  Last Fill Quantity: 90,  # refills: 0   -FAIL: refill request for this drug class needs provider review    Last office visit provider:   4/19/23    Requested Prescriptions   Pending Prescriptions Disp Refills    rosuvastatin (CRESTOR) 10 MG tablet [Pharmacy Med Name: ROSUVASTATIN 10MG TABLETS] 90 tablet 2     Sig: TAKE 1 TABLET(10 MG) BY MOUTH AT BEDTIME       Statins Protocol Failed - 7/25/2023  2:02 PM        Failed - LDL on file in past 12 months     Recent Labs   Lab Test 04/26/22  0720   LDL 72             Passed - No abnormal creatine kinase in past 12 months     No lab results found.             Passed - Recent (12 mo) or future (30 days) visit within the authorizing provider's specialty     Patient has had an office visit with the authorizing provider or a provider within the authorizing providers department within the previous 12 mos or has a future within next 30 days. See \"Patient Info\" tab in inbasket, or \"Choose Columns\" in Meds & Orders section of the refill encounter.              Passed - Medication is active on med list        Passed - Patient is age 18 or older        Passed - No active pregnancy on record        Passed - No positive pregnancy test in past 12 months          clopidogrel (PLAVIX) 75 MG tablet [Pharmacy Med Name: CLOPIDOGREL 75MG TABLETS] 90 tablet 0     Sig: TAKE 1 TABLET(75 MG) BY MOUTH DAILY       Plavix Passed - 7/25/2023  2:02 PM        Passed - No active PPI on record unless is Protonix        Passed - Normal HGB on file in past 12 months     Recent Labs   Lab Test 04/19/23  1610   HGB 13.5               Passed - Normal Platelets on file in past 12 months     Recent Labs   Lab Test 04/19/23  1610                 " " Passed - Recent (12 mo) or future (30 days) visit within the authorizing provider's specialty     Patient has had an office visit with the authorizing provider or a provider within the authorizing providers department within the previous 12 mos or has a future within next 30 days. See \"Patient Info\" tab in inbasket, or \"Choose Columns\" in Meds & Orders section of the refill encounter.              Passed - Medication is active on med list        Passed - Patient is age 18 or older        Passed - No active pregnancy on record        Passed - No positive pregnancy test in past 12 months             SEEMA HERNANDEZ RN 07/25/23 2:02 PM  "

## 2023-07-30 DIAGNOSIS — R76.0 ANTICARDIOLIPIN ANTIBODY POSITIVE: ICD-10-CM

## 2023-07-30 RX ORDER — CLOPIDOGREL BISULFATE 75 MG/1
TABLET ORAL
Qty: 90 TABLET | Refills: 2 | Status: SHIPPED | OUTPATIENT
Start: 2023-07-30 | End: 2023-09-07

## 2023-07-30 NOTE — TELEPHONE ENCOUNTER
"Last Written Prescription Date:  7/25/23  Last Fill Quantity: 90,  # refills: 0   Last office visit provider:  4/19/23     Requested Prescriptions   Pending Prescriptions Disp Refills    clopidogrel (PLAVIX) 75 MG tablet [Pharmacy Med Name: CLOPIDOGREL 75MG TABLETS] 90 tablet 0     Sig: TAKE 1 TABLET(75 MG) BY MOUTH DAILY       Plavix Passed - 7/30/2023  8:00 AM        Passed - No active PPI on record unless is Protonix        Passed - Normal HGB on file in past 12 months     Recent Labs   Lab Test 04/19/23  1610   HGB 13.5               Passed - Normal Platelets on file in past 12 months     Recent Labs   Lab Test 04/19/23  1610                  Passed - Recent (12 mo) or future (30 days) visit within the authorizing provider's specialty     Patient has had an office visit with the authorizing provider or a provider within the authorizing providers department within the previous 12 mos or has a future within next 30 days. See \"Patient Info\" tab in inbasket, or \"Choose Columns\" in Meds & Orders section of the refill encounter.              Passed - Medication is active on med list        Passed - Patient is age 18 or older        Passed - No active pregnancy on record        Passed - No positive pregnancy test in past 12 months             DONATO AMEZCUA RN 07/30/23 2:55 PM  "

## 2023-08-13 ENCOUNTER — HEALTH MAINTENANCE LETTER (OUTPATIENT)
Age: 73
End: 2023-08-13

## 2023-09-06 ASSESSMENT — ENCOUNTER SYMPTOMS
HEMATOCHEZIA: 0
SHORTNESS OF BREATH: 0
HEMATURIA: 0
DYSURIA: 0
COUGH: 0
PARESTHESIAS: 0
JOINT SWELLING: 0
CHILLS: 0
BREAST MASS: 0
EYE PAIN: 0
CONSTIPATION: 0
FEVER: 0
MYALGIAS: 1
WEAKNESS: 0
PALPITATIONS: 0
ABDOMINAL PAIN: 0
DIARRHEA: 0
ARTHRALGIAS: 1
SORE THROAT: 0
DIZZINESS: 0
NERVOUS/ANXIOUS: 0
HEARTBURN: 0
NAUSEA: 0
FREQUENCY: 1
HEADACHES: 0

## 2023-09-06 ASSESSMENT — ACTIVITIES OF DAILY LIVING (ADL): CURRENT_FUNCTION: NO ASSISTANCE NEEDED

## 2023-09-07 ENCOUNTER — OFFICE VISIT (OUTPATIENT)
Dept: FAMILY MEDICINE | Facility: CLINIC | Age: 73
End: 2023-09-07
Payer: COMMERCIAL

## 2023-09-07 VITALS
WEIGHT: 146.4 LBS | HEIGHT: 62 IN | HEART RATE: 74 BPM | BODY MASS INDEX: 26.94 KG/M2 | OXYGEN SATURATION: 97 % | SYSTOLIC BLOOD PRESSURE: 138 MMHG | DIASTOLIC BLOOD PRESSURE: 84 MMHG | RESPIRATION RATE: 16 BRPM

## 2023-09-07 DIAGNOSIS — E78.01 FAMILIAL HYPERCHOLESTEROLEMIA: ICD-10-CM

## 2023-09-07 DIAGNOSIS — L30.1 DYSHYDROSIS: ICD-10-CM

## 2023-09-07 DIAGNOSIS — R76.0 ANTICARDIOLIPIN ANTIBODY POSITIVE: ICD-10-CM

## 2023-09-07 DIAGNOSIS — Z00.00 ENCOUNTER FOR MEDICARE ANNUAL WELLNESS EXAM: Primary | ICD-10-CM

## 2023-09-07 DIAGNOSIS — M21.612 BUNION, LEFT: ICD-10-CM

## 2023-09-07 LAB
ALBUMIN SERPL BCG-MCNC: 4.4 G/DL (ref 3.5–5.2)
ALP SERPL-CCNC: 64 U/L (ref 35–104)
ALT SERPL W P-5'-P-CCNC: 15 U/L (ref 0–50)
ANION GAP SERPL CALCULATED.3IONS-SCNC: 10 MMOL/L (ref 7–15)
AST SERPL W P-5'-P-CCNC: 26 U/L (ref 0–45)
BILIRUB SERPL-MCNC: 0.3 MG/DL
BUN SERPL-MCNC: 9.1 MG/DL (ref 8–23)
CALCIUM SERPL-MCNC: 9.7 MG/DL (ref 8.8–10.2)
CHLORIDE SERPL-SCNC: 101 MMOL/L (ref 98–107)
CHOLEST SERPL-MCNC: 146 MG/DL
CREAT SERPL-MCNC: 0.63 MG/DL (ref 0.51–0.95)
DEPRECATED HCO3 PLAS-SCNC: 27 MMOL/L (ref 22–29)
EGFRCR SERPLBLD CKD-EPI 2021: >90 ML/MIN/1.73M2
GLUCOSE SERPL-MCNC: 88 MG/DL (ref 70–99)
HDLC SERPL-MCNC: 70 MG/DL
LDLC SERPL CALC-MCNC: 61 MG/DL
NONHDLC SERPL-MCNC: 76 MG/DL
POTASSIUM SERPL-SCNC: 4.5 MMOL/L (ref 3.4–5.3)
PROT SERPL-MCNC: 7.1 G/DL (ref 6.4–8.3)
SODIUM SERPL-SCNC: 138 MMOL/L (ref 136–145)
TRIGL SERPL-MCNC: 75 MG/DL

## 2023-09-07 PROCEDURE — 80053 COMPREHEN METABOLIC PANEL: CPT | Performed by: FAMILY MEDICINE

## 2023-09-07 PROCEDURE — 99213 OFFICE O/P EST LOW 20 MIN: CPT | Mod: 25 | Performed by: FAMILY MEDICINE

## 2023-09-07 PROCEDURE — G0008 ADMIN INFLUENZA VIRUS VAC: HCPCS | Performed by: FAMILY MEDICINE

## 2023-09-07 PROCEDURE — 90662 IIV NO PRSV INCREASED AG IM: CPT | Performed by: FAMILY MEDICINE

## 2023-09-07 PROCEDURE — G0439 PPPS, SUBSEQ VISIT: HCPCS | Performed by: FAMILY MEDICINE

## 2023-09-07 PROCEDURE — 80061 LIPID PANEL: CPT | Performed by: FAMILY MEDICINE

## 2023-09-07 PROCEDURE — 36415 COLL VENOUS BLD VENIPUNCTURE: CPT | Performed by: FAMILY MEDICINE

## 2023-09-07 RX ORDER — ROSUVASTATIN CALCIUM 10 MG/1
10 TABLET, COATED ORAL AT BEDTIME
Qty: 90 TABLET | Refills: 3 | Status: SHIPPED | OUTPATIENT
Start: 2023-09-07

## 2023-09-07 RX ORDER — CLOPIDOGREL BISULFATE 75 MG/1
75 TABLET ORAL DAILY
Qty: 90 TABLET | Refills: 3 | Status: SHIPPED | OUTPATIENT
Start: 2023-09-07

## 2023-09-07 RX ORDER — TRIAMCINOLONE ACETONIDE 1 MG/G
OINTMENT TOPICAL 3 TIMES DAILY PRN
Qty: 60 G | Refills: 3 | Status: SHIPPED | OUTPATIENT
Start: 2023-09-07

## 2023-09-07 RX ORDER — PREDNISOLONE ACETATE 10 MG/ML
1 SUSPENSION/ DROPS OPHTHALMIC PRN
COMMUNITY

## 2023-09-07 RX ORDER — ACETAMINOPHEN 500 MG
1000 TABLET ORAL PRN
COMMUNITY

## 2023-09-07 RX ORDER — CALCIUM CARBONATE/VITAMIN D3 600 MG-10
1 TABLET ORAL
COMMUNITY

## 2023-09-07 ASSESSMENT — ENCOUNTER SYMPTOMS
HEMATOCHEZIA: 0
NERVOUS/ANXIOUS: 0
PALPITATIONS: 0
FEVER: 0
COUGH: 0
ARTHRALGIAS: 1
WEAKNESS: 0
HEARTBURN: 0
NAUSEA: 0
CONSTIPATION: 0
BREAST MASS: 0
ABDOMINAL PAIN: 0
HEMATURIA: 0
EYE PAIN: 0
HEADACHES: 0
DIZZINESS: 0
FREQUENCY: 1
JOINT SWELLING: 0
SHORTNESS OF BREATH: 0
CHILLS: 0
MYALGIAS: 1
DYSURIA: 0
SORE THROAT: 0
DIARRHEA: 0
PARESTHESIAS: 0

## 2023-09-07 ASSESSMENT — ACTIVITIES OF DAILY LIVING (ADL): CURRENT_FUNCTION: NO ASSISTANCE NEEDED

## 2023-09-07 NOTE — PATIENT INSTRUCTIONS
Patient Education   Personalized Prevention Plan  You are due for the preventive services outlined below.  Your care team is available to assist you in scheduling these services.  If you have already completed any of these items, please share that information with your care team to update in your medical record.  Health Maintenance Due   Topic Date Due     Zoster (Shingles) Vaccine (2 of 3) 06/05/2014     COVID-19 Vaccine (5 - Moderna series) 03/10/2023     Cholesterol Lab  04/26/2023     ANNUAL REVIEW OF HM ORDERS  05/04/2023     Flu Vaccine (1) 09/01/2023     Hearing Loss: Care Instructions  Overview     Hearing loss is a sudden or slow decrease in how well you hear. It can range from slight to profound. Permanent hearing loss can occur with aging. It also can happen when you are exposed long-term to loud noise. Examples include listening to loud music, riding motorcycles, or being around other loud machines.  Hearing loss can affect your work and home life. It can make you feel lonely or depressed. You may feel that you have lost your independence. But hearing aids and other devices can help you hear better and feel connected to others.  Follow-up care is a key part of your treatment and safety. Be sure to make and go to all appointments, and call your doctor if you are having problems. It's also a good idea to know your test results and keep a list of the medicines you take.  How can you care for yourself at home?  Avoid loud noises whenever possible. This helps keep your hearing from getting worse.  Always wear hearing protection around loud noises.  Wear a hearing aid as directed.  A professional can help you pick a hearing aid that will work best for you.  You can also get hearing aids over the counter for mild to moderate hearing loss.  Have hearing tests as your doctor suggests. They can show whether your hearing has changed. Your hearing aid may need to be adjusted.  Use other devices as needed. These may  "include:  Telephone amplifiers and hearing aids that can connect to a television, stereo, radio, or microphone.  Devices that use lights or vibrations. These alert you to the doorbell, a ringing telephone, or a baby monitor.  Television closed-captioning. This shows the words at the bottom of the screen. Most new TVs can do this.  TTY (text telephone). This lets you type messages back and forth on the telephone instead of talking or listening. These devices are also called TDD. When messages are typed on the keyboard, they are sent over the phone line to a receiving TTY. The message is shown on a monitor.  Use text messaging, social media, and email if it is hard for you to communicate by telephone.  Try to learn a listening technique called speechreading. It is not lipreading. You pay attention to people's gestures, expressions, posture, and tone of voice. These clues can help you understand what a person is saying. Face the person you are talking to, and have them face you. Make sure the lighting is good. You need to see the other person's face clearly.  Think about counseling if you need help to adjust to your hearing loss.  When should you call for help?  Watch closely for changes in your health, and be sure to contact your doctor if:    You think your hearing is getting worse.     You have new symptoms, such as dizziness or nausea.   Where can you learn more?  Go to https://www.[employer].net/patiented  Enter R798 in the search box to learn more about \"Hearing Loss: Care Instructions.\"  Current as of: March 1, 2023               Content Version: 13.7    3525-2048 [employer].   Care instructions adapted under license by your healthcare professional. If you have questions about a medical condition or this instruction, always ask your healthcare professional. [employer] disclaims any warranty or liability for your use of this information.         "

## 2023-09-07 NOTE — PROGRESS NOTES
"SUBJECTIVE:   Mone is a 73 year old who presents for Preventive Visit.      9/7/2023    11:01 AM   Additional Questions   Roomed by Meredith       Are you in the first 12 months of your Medicare coverage?  No    Healthy Habits:     In general, how would you rate your overall health?  Good    Frequency of exercise:  4-5 days/week    Duration of exercise:  30-45 minutes    Do you usually eat at least 4 servings of fruit and vegetables a day, include whole grains    & fiber and avoid regularly eating high fat or \"junk\" foods?  Yes    Taking medications regularly:  Yes    Ability to successfully perform activities of daily living:  No assistance needed    Home Safety:  No safety concerns identified    Hearing Impairment:  Need to ask people to speak up or repeat themselves, find that men's voices are easier to understand than woman's and difficulty understanding soft or whispered speech    In the past 6 months, have you been bothered by leaking of urine?  No    In general, how would you rate your overall mental or emotional health?  Good    Additional concerns today:  Yes    Osteoporosis started on Reclast last year.  No concerns.  Will be due for DEXA scan in March.    Hyperlipidemia current medication is working well.  We will do fasting labs today.  No concerns.  Also on Plavix for anticardiolipin antibody.  No concerns with bleeding.    Foot pain is ongoing issue.  Has known bunion.  Getting more disruptive.  Would like to see a specialist.    Have you ever done Advance Care Planning? (For example, a Health Directive, POLST, or a discussion with a medical provider or your loved ones about your wishes): No, advance care planning information given to patient to review.  Patient plans to discuss their wishes with loved ones or provider.         Fall risk  Fallen 2 or more times in the past year?: No  Any fall with injury in the past year?: No    Cognitive Screening   1) Repeat 3 items (Leader, Season, Table)    2) Clock " draw: NORMAL  3) 3 item recall: Recalls 3 objects  Results: 3 items recalled: COGNITIVE IMPAIRMENT LESS LIKELY    Mini-CogTM Copyright S Irving. Licensed by the author for use in [facility]; reprinted with permission (mirian@[email or web address]). All rights reserved.      Do you have sleep apnea, excessive snoring or daytime drowsiness? : no    Reviewed and updated as needed this visit by clinical staff    Allergies               Reviewed and updated as needed this visit by Provider                 Social History     Tobacco Use    Smoking status: Former     Packs/day: 2.00     Years: 10.00     Pack years: 20.00     Types: Cigarettes     Start date: 1966     Quit date: 1976     Years since quittin.6    Smokeless tobacco: Never   Substance Use Topics    Alcohol use: Yes     Comment: A few drinks a year             2023    10:06 PM   Alcohol Use   Prescreen: >3 drinks/day or >7 drinks/week? No     Do you have a current opioid prescription? No  Do you use any other controlled substances or medications that are not prescribed by a provider? None              Current providers sharing in care for this patient include:   Patient Care Team:  Alexus Conti MD as PCP - General (Family Medicine)  Dena Joyner MD as MD (Endocrinology, Diabetes, and Metabolism)  Alexus Conti MD as Assigned PCP  Dena Joyner MD as Assigned Endocrinology Provider    The following health maintenance items are reviewed in Epic and correct as of today:  Health Maintenance   Topic Date Due    HEPATITIS C SCREENING  Never done    ZOSTER IMMUNIZATION (2 of 3) 2014    DTAP/TDAP/TD IMMUNIZATION (7 - Tdap) 2015    COVID-19 Vaccine (5 - Moderna series) 03/10/2023    LIPID  2023    MEDICARE ANNUAL WELLNESS VISIT  2023    ANNUAL REVIEW OF HM ORDERS  2023    INFLUENZA VACCINE (1) 2023    FALL RISK ASSESSMENT  2024    MAMMO SCREENING  2025    COLORECTAL CANCER SCREENING   "06/20/2026    ADVANCE CARE PLANNING  05/04/2027    DEXA  01/28/2034    PHQ-2 (once per calendar year)  Completed    Pneumococcal Vaccine: 65+ Years  Completed    IPV IMMUNIZATION  Aged Out    HPV IMMUNIZATION  Aged Out    MENINGITIS IMMUNIZATION  Aged Out             Pertinent mammograms are reviewed under the imaging tab.    Review of Systems   Constitutional:  Negative for chills and fever.   HENT:  Negative for congestion, ear pain, hearing loss and sore throat.    Eyes:  Negative for pain and visual disturbance.   Respiratory:  Negative for cough and shortness of breath.    Cardiovascular:  Negative for chest pain, palpitations and peripheral edema.   Gastrointestinal:  Negative for abdominal pain, constipation, diarrhea, heartburn, hematochezia and nausea.   Breasts:  Negative for tenderness, breast mass and discharge.   Genitourinary:  Positive for frequency. Negative for dysuria, genital sores, hematuria, pelvic pain, urgency, vaginal bleeding and vaginal discharge.   Musculoskeletal:  Positive for arthralgias and myalgias. Negative for joint swelling.   Skin:  Negative for rash.   Neurological:  Negative for dizziness, weakness, headaches and paresthesias.   Psychiatric/Behavioral:  Negative for mood changes. The patient is not nervous/anxious.          OBJECTIVE:   LMP  (LMP Unknown)  Estimated body mass index is 28.11 kg/m  as calculated from the following:    Height as of 4/19/23: 1.556 m (5' 1.25\").    Weight as of 4/19/23: 68 kg (150 lb).  Physical Exam  GENERAL: healthy, alert and no distress  EYES: Eyes grossly normal to inspection, PERRL and conjunctivae and sclerae normal  NECK: no adenopathy, no asymmetry, masses, or scars and thyroid normal to palpation  RESP: lungs clear to auscultation - no rales, rhonchi or wheezes  CV: regular rate and rhythm, normal S1 S2, no S3 or S4, no murmur, click or rub, no peripheral edema and peripheral pulses strong  MS: no gross musculoskeletal defects noted, no " "edema  SKIN: no suspicious lesions or rashes  NEURO: Normal strength and tone, mentation intact and speech normal  PSYCH: mentation appears normal, affect normal/bright        ASSESSMENT / PLAN:   Encounter for Medicare annual wellness exam  Health maintenance updated, preventive services reviewed, vaccines discussed, questions are answered, patient encouraged to continue annual wellness visits to review preventive services    Familial hypercholesterolemia  Continue rosuvastatin, stable, lipid panel today  - Lipid panel reflex to direct LDL Non-fasting; Future  - rosuvastatin (CRESTOR) 10 MG tablet; Take 1 tablet (10 mg) by mouth At Bedtime  - Comprehensive metabolic panel (BMP + Alb, Alk Phos, ALT, AST, Total. Bili, TP); Future  - Lipid panel reflex to direct LDL Non-fasting  - Comprehensive metabolic panel (BMP + Alb, Alk Phos, ALT, AST, Total. Bili, TP)    Anticardiolipin antibody positive  Stable on Plavix, refilled for a year  - clopidogrel (PLAVIX) 75 MG tablet; Take 1 tablet (75 mg) by mouth daily    Dyshydrosis  Working well, refilled  - triamcinolone (KENALOG) 0.1 % external ointment; Apply topically 3 times daily as needed (rash)    Bunion, left  Referred to orthopedic surgery  - Orthopedic  Referral; Future      Patient has been advised of split billing requirements and indicates understanding: Yes      COUNSELING:  Reviewed preventive health counseling, as reflected in patient instructions      BMI:   Estimated body mass index is 28.11 kg/m  as calculated from the following:    Height as of 4/19/23: 1.556 m (5' 1.25\").    Weight as of 4/19/23: 68 kg (150 lb).         She reports that she quit smoking about 47 years ago. Her smoking use included cigarettes. She started smoking about 57 years ago. She has a 20.00 pack-year smoking history. She has never used smokeless tobacco.      Appropriate preventive services were discussed with this patient, including applicable screening as appropriate for " cardiovascular disease, diabetes, osteopenia/osteoporosis, and glaucoma.  As appropriate for age/gender, discussed screening for colorectal cancer, prostate cancer, breast cancer, and cervical cancer. Checklist reviewing preventive services available has been given to the patient.    Reviewed patients plan of care and provided an AVS. The Basic Care Plan (routine screening as documented in Health Maintenance) for Mone meets the Care Plan requirement. This Care Plan has been established and reviewed with the Patient.          Alexus Conti MD  [facility]    Identified Health Risks:  I have reviewed Opioid Use Disorder and Substance Use Disorder risk factors and made any needed referrals. The patient was provided with written information regarding signs of hearing loss.

## 2023-09-28 ENCOUNTER — MEDICAL CORRESPONDENCE (OUTPATIENT)
Dept: HEALTH INFORMATION MANAGEMENT | Facility: CLINIC | Age: 73
End: 2023-09-28

## 2023-09-28 ENCOUNTER — TRANSFERRED RECORDS (OUTPATIENT)
Dept: HEALTH INFORMATION MANAGEMENT | Facility: CLINIC | Age: 73
End: 2023-09-28

## 2023-09-28 ENCOUNTER — ANCILLARY PROCEDURE (OUTPATIENT)
Dept: GENERAL RADIOLOGY | Facility: CLINIC | Age: 73
End: 2023-09-28
Attending: STUDENT IN AN ORGANIZED HEALTH CARE EDUCATION/TRAINING PROGRAM
Payer: COMMERCIAL

## 2023-09-28 DIAGNOSIS — M20.10 ACQUIRED HALLUX VALGUS: ICD-10-CM

## 2023-10-09 ASSESSMENT — ENCOUNTER SYMPTOMS
SWOLLEN GLANDS: 0
BRUISES/BLEEDS EASILY: 1

## 2023-10-11 ENCOUNTER — OFFICE VISIT (OUTPATIENT)
Dept: ENDOCRINOLOGY | Facility: CLINIC | Age: 73
End: 2023-10-11
Payer: COMMERCIAL

## 2023-10-11 VITALS
HEIGHT: 61 IN | WEIGHT: 150 LBS | SYSTOLIC BLOOD PRESSURE: 147 MMHG | DIASTOLIC BLOOD PRESSURE: 84 MMHG | HEART RATE: 80 BPM | BODY MASS INDEX: 28.32 KG/M2

## 2023-10-11 DIAGNOSIS — M81.8 OTHER OSTEOPOROSIS WITHOUT CURRENT PATHOLOGICAL FRACTURE: Primary | ICD-10-CM

## 2023-10-11 PROCEDURE — 99213 OFFICE O/P EST LOW 20 MIN: CPT | Performed by: INTERNAL MEDICINE

## 2023-10-11 RX ORDER — EPINEPHRINE 1 MG/ML
0.3 INJECTION, SOLUTION, CONCENTRATE INTRAVENOUS EVERY 5 MIN PRN
Status: CANCELLED | OUTPATIENT
Start: 2023-12-11

## 2023-10-11 RX ORDER — ALBUTEROL SULFATE 0.83 MG/ML
2.5 SOLUTION RESPIRATORY (INHALATION)
Status: CANCELLED | OUTPATIENT
Start: 2023-12-11

## 2023-10-11 RX ORDER — MEPERIDINE HYDROCHLORIDE 25 MG/ML
25 INJECTION INTRAMUSCULAR; INTRAVENOUS; SUBCUTANEOUS EVERY 30 MIN PRN
Status: CANCELLED | OUTPATIENT
Start: 2023-12-11

## 2023-10-11 RX ORDER — ZOLEDRONIC ACID 5 MG/100ML
5 INJECTION, SOLUTION INTRAVENOUS ONCE
Status: CANCELLED
Start: 2023-12-11

## 2023-10-11 RX ORDER — METHYLPREDNISOLONE SODIUM SUCCINATE 125 MG/2ML
125 INJECTION, POWDER, LYOPHILIZED, FOR SOLUTION INTRAMUSCULAR; INTRAVENOUS
Status: CANCELLED
Start: 2023-12-11

## 2023-10-11 RX ORDER — HEPARIN SODIUM (PORCINE) LOCK FLUSH IV SOLN 100 UNIT/ML 100 UNIT/ML
5 SOLUTION INTRAVENOUS
Status: CANCELLED | OUTPATIENT
Start: 2023-12-11

## 2023-10-11 RX ORDER — ALBUTEROL SULFATE 90 UG/1
1-2 AEROSOL, METERED RESPIRATORY (INHALATION)
Status: CANCELLED
Start: 2023-12-11

## 2023-10-11 RX ORDER — DIPHENHYDRAMINE HYDROCHLORIDE 50 MG/ML
50 INJECTION INTRAMUSCULAR; INTRAVENOUS
Status: CANCELLED
Start: 2023-12-11

## 2023-10-11 RX ORDER — ACETAMINOPHEN 325 MG/1
325 TABLET ORAL
Status: CANCELLED | OUTPATIENT
Start: 2023-12-11

## 2023-10-11 RX ORDER — HEPARIN SODIUM,PORCINE 10 UNIT/ML
5-20 VIAL (ML) INTRAVENOUS DAILY PRN
Status: CANCELLED | OUTPATIENT
Start: 2023-12-11

## 2023-10-11 NOTE — LETTER
"    10/11/2023         RE: Mone Humphreys   [facility] [place] [address]        Dear Colleague,    Thank you for referring your patient, Mone Humphreys, to the [facility] [place]. Please see a copy of my visit note below.    Mone Humphreys is a 73 year old yo female who presents today for follow-up of osteopenia. She was last seen by me Sept 2022. Of note, she also has past medical history of Hypercholesterolemia, Glaucoma, antiphospholipid syndrome.    Chief Complaint   Patient presents with     RECHECK     Osteopenia of hip, unspecified laterality +1 more     Has history of gum disease, follows with periodontist, requires interval tissue grafts. Describes the disease as \"pockets\" that are under control. However, was told not to take bisphosphonates for this reason.      1) Osteoporosis- reviewed and updated with patient  Diagnosis: 50s, routine dexa  Previous Fractures: never  Bone-specific therapy: Fosamax, taken off, Calcitonin nasal spray, Reclast 12/2022  Family History of Hip Fx: Father had stroke in 50s,  hip replacement, unclear why, R side paralyzed, doesn't remember a fall or anything  Hormone History (Menopause, Testosterone): 50s , had heavy menses (double uterus and APS)  Steroids: eye drops, joint injections every 3 months >10 years  PPIs: none  Antiepileptics: none  Anticoagulation: none- plavix  Autoimmune disease: Antiphospholipid Syndrome  CKD: none  Nephrolithiasis: none  Other risk factors: none   Ca/D: Becker Calcium Gummies 500mg BID, Vitamin D  25mg (1000u) daily--- Total 1000mg Ca, 2000u Vit D, Protein shake daily (? Ca supplement), eats cottage cheese or yogurt daily  Exercise: contemporary line dancing 2-3x per week, walk (outside or tredmill) daily 45min, many grandchildren and babysit every Friday, yardwork   Smoking: quit 1997  Alcohol: rare  Height loss- Tallest 5'4.5\", Current 5'2\"       Relevant Family History:    Active diagnoses this visit:  Other " "osteoporosis without current pathological fracture     ROS: 10 point ROS neg other than the symptoms noted above in the HPI.      Objective:  BP (!) 147/84 (BP Location: Left arm, Patient Position: Sitting, Cuff Size: Adult Regular)   Pulse 80   Ht 1.56 m (5' 1.42\")   Wt 68 kg (150 lb)   LMP  (LMP Unknown)   BMI 27.96 kg/m      Physical Exam   CONSTITUTIONAL: healthy, alert and NAD, responding appropriately  ENT: normocephalic, no visual evidence of trauma, normal nose and oral mucosa  EYES: conjunctivae and sclerae normal, no exophthalmos or proptosis  THYROID:  no visualized nodules or goiter  LUNGS: no audible wheeze, cough or visible cyanosis, no visible retractions or increased work of breathing  EXTREMITIES: no hand tremors  NEUROLOGY: cranial nerves grossly intact with no obvious deficit.  SKIN:  no visualized skin lesions or rash, no edema visualized  PSYCH: mentation appears normal, normal judgement        Lab Results   Component Value Date/Time    TSH 1.57 09/21/2022 08:20 AM    PTHI 41 09/21/2022 08:20 AM     Last Comprehensive Metabolic Panel:  Sodium   Date Value Ref Range Status   09/07/2023 138 136 - 145 mmol/L Final     Potassium   Date Value Ref Range Status   09/07/2023 4.5 3.4 - 5.3 mmol/L Final   04/26/2022 4.4 3.4 - 5.3 mmol/L Final     Chloride   Date Value Ref Range Status   09/07/2023 101 98 - 107 mmol/L Final   04/26/2022 107 94 - 109 mmol/L Final     Carbon Dioxide (CO2)   Date Value Ref Range Status   09/07/2023 27 22 - 29 mmol/L Final   04/26/2022 29 20 - 32 mmol/L Final     Anion Gap   Date Value Ref Range Status   09/07/2023 10 7 - 15 mmol/L Final   04/26/2022 4 3 - 14 mmol/L Final     Glucose   Date Value Ref Range Status   09/07/2023 88 70 - 99 mg/dL Final   04/26/2022 95 70 - 99 mg/dL Final     Urea Nitrogen   Date Value Ref Range Status   09/07/2023 9.1 8.0 - 23.0 mg/dL Final   04/26/2022 8 7 - 30 mg/dL Final     Creatinine   Date Value Ref Range Status   09/07/2023 0.63 0.51 - " 0.95 mg/dL Final     GFR Estimate   Date Value Ref Range Status   09/07/2023 >90 >60 mL/min/1.73m2 Final     Calcium   Date Value Ref Range Status   09/07/2023 9.7 8.8 - 10.2 mg/dL Final         Alkaline Phosphatase   Date Value Ref Range Status   09/07/2023 64 35 - 104 U/L Final       DEXA 2019-     DEXA May 2022:            ASSESSMENT / PLAN:  (M85.859) Osteopenia of hip, unspecified laterality      1) Osteopenia with high frax score-- Osteoporosis  - Of note has had significant decline (at least 4%) over all sites over past 3 years  - Has been on calcitonin for 15+ years, with likley waining effect (recommended therapy is 3 months)  - FRAX score now into range with recommended therapy  - Would recommend 2-4 years bisphosphonate therapy ideally to improve BMD into better range and then have drug holiday. Will need to clarify underlying gum/dental disease with periodontist   - Continue reclast annually-- next dose December  - recheck bone turnover markers now (CTX checked at last visit)  - Update bone density Fall 2024 and meet back after to decide continued treatment          10-year probability of a hip fracture = 3% or a 10-year probability of a major osteoporosis-related fracture = 20% warrants treatment.     Orders Placed This Encounter   Procedures     DX Hip/Pelvis/Spine     Vitamin D Deficiency     Comprehensive metabolic panel     C-Telopeptide, Beta-Cross-Linked     C-Telopeptide, Beta-Cross-Linked     Comprehensive metabolic panel     Vitamin D Deficiency       Return to clinic: 1 year    A total of 20 minutes were spent today 10/11/23 on this visit including chart review, history and counseling, documentation and other activities as detailed above.       Answers submitted by the patient for this visit:  Symptoms you have experienced in the last 30 days (Submitted on 10/9/2023)  General Symptoms: No  Skin Symptoms: No  HENT Symptoms: No  EYE SYMPTOMS: No  HEART SYMPTOMS: No  LUNG SYMPTOMS: No  INTESTINAL  SYMPTOMS: No  URINARY SYMPTOMS: No  GYNECOLOGIC SYMPTOMS: No  BREAST SYMPTOMS: No  SKELETAL SYMPTOMS: No  BLOOD SYMPTOMS: Yes  NERVOUS SYSTEM SYMPTOMS: No  MENTAL HEALTH SYMPTOMS: No  Please answer the questions below to tell us what condition you are experiencing: (Submitted on 10/9/2023)  Anemia: No  Swollen glands: No  Easy bleeding or bruising: Yes  Edema or swelling: No      Again, thank you for allowing me to participate in the care of your patient.        Sincerely,        Dena Joyner MD

## 2023-10-11 NOTE — PROGRESS NOTES
"Mone Humphreys is a 73 year old yo female who presents today for follow-up of osteopenia. She was last seen by me Sept 2022. Of note, she also has past medical history of Hypercholesterolemia, Glaucoma, antiphospholipid syndrome.    Chief Complaint   Patient presents with    RECHECK     Osteopenia of hip, unspecified laterality +1 more     Has history of gum disease, follows with periodontist, requires interval tissue grafts. Describes the disease as \"pockets\" that are under control. However, was told not to take bisphosphonates for this reason.      1) Osteoporosis- reviewed and updated with patient  Diagnosis: 50s, routine dexa  Previous Fractures: never  Bone-specific therapy: Fosamax, taken off, Calcitonin nasal spray, Reclast 12/2022  Family History of Hip Fx: Father had stroke in 50s,  hip replacement, unclear why, R side paralyzed, doesn't remember a fall or anything  Hormone History (Menopause, Testosterone): 50s , had heavy menses (double uterus and APS)  Steroids: eye drops, joint injections every 3 months >10 years  PPIs: none  Antiepileptics: none  Anticoagulation: none- plavix  Autoimmune disease: Antiphospholipid Syndrome  CKD: none  Nephrolithiasis: none  Other risk factors: none   Ca/D: Becker Calcium Gummies 500mg BID, Vitamin D  25mg (1000u) daily--- Total 1000mg Ca, 2000u Vit D, Protein shake daily (? Ca supplement), eats cottage cheese or yogurt daily  Exercise: contemporary line dancing 2-3x per week, walk (outside or tredmill) daily 45min, many grandchildren and babysit every Friday, yardwork   Smoking: quit 1997  Alcohol: rare  Height loss- Tallest 5'4.5\", Current 5'2\"       Relevant Family History:    Active diagnoses this visit:  Other osteoporosis without current pathological fracture     ROS: 10 point ROS neg other than the symptoms noted above in the HPI.      Objective:  BP (!) 147/84 (BP Location: Left arm, Patient Position: Sitting, Cuff Size: Adult Regular)   Pulse 80   Ht 1.56 m " "(5' 1.42\")   Wt 68 kg (150 lb)   LMP  (LMP Unknown)   BMI 27.96 kg/m      Physical Exam   CONSTITUTIONAL: healthy, alert and NAD, responding appropriately  ENT: normocephalic, no visual evidence of trauma, normal nose and oral mucosa  EYES: conjunctivae and sclerae normal, no exophthalmos or proptosis  THYROID:  no visualized nodules or goiter  LUNGS: no audible wheeze, cough or visible cyanosis, no visible retractions or increased work of breathing  EXTREMITIES: no hand tremors  NEUROLOGY: cranial nerves grossly intact with no obvious deficit.  SKIN:  no visualized skin lesions or rash, no edema visualized  PSYCH: mentation appears normal, normal judgement        Lab Results   Component Value Date/Time    TSH 1.57 09/21/2022 08:20 AM    PTHI 41 09/21/2022 08:20 AM     Last Comprehensive Metabolic Panel:  Sodium   Date Value Ref Range Status   09/07/2023 138 136 - 145 mmol/L Final     Potassium   Date Value Ref Range Status   09/07/2023 4.5 3.4 - 5.3 mmol/L Final   04/26/2022 4.4 3.4 - 5.3 mmol/L Final     Chloride   Date Value Ref Range Status   09/07/2023 101 98 - 107 mmol/L Final   04/26/2022 107 94 - 109 mmol/L Final     Carbon Dioxide (CO2)   Date Value Ref Range Status   09/07/2023 27 22 - 29 mmol/L Final   04/26/2022 29 20 - 32 mmol/L Final     Anion Gap   Date Value Ref Range Status   09/07/2023 10 7 - 15 mmol/L Final   04/26/2022 4 3 - 14 mmol/L Final     Glucose   Date Value Ref Range Status   09/07/2023 88 70 - 99 mg/dL Final   04/26/2022 95 70 - 99 mg/dL Final     Urea Nitrogen   Date Value Ref Range Status   09/07/2023 9.1 8.0 - 23.0 mg/dL Final   04/26/2022 8 7 - 30 mg/dL Final     Creatinine   Date Value Ref Range Status   09/07/2023 0.63 0.51 - 0.95 mg/dL Final     GFR Estimate   Date Value Ref Range Status   09/07/2023 >90 >60 mL/min/1.73m2 Final     Calcium   Date Value Ref Range Status   09/07/2023 9.7 8.8 - 10.2 mg/dL Final         Alkaline Phosphatase   Date Value Ref Range Status "   09/07/2023 64 35 - 104 U/L Final       DEXA 2019-     DEXA May 2022:            ASSESSMENT / PLAN:  (M85.859) Osteopenia of hip, unspecified laterality      1) Osteopenia with high frax score-- Osteoporosis  - Of note has had significant decline (at least 4%) over all sites over past 3 years  - Has been on calcitonin for 15+ years, with likley waining effect (recommended therapy is 3 months)  - FRAX score now into range with recommended therapy  - Would recommend 2-4 years bisphosphonate therapy ideally to improve BMD into better range and then have drug holiday. Will need to clarify underlying gum/dental disease with periodontist   - Continue reclast annually-- next dose December  - recheck bone turnover markers now (CTX checked at last visit)  - Update bone density Fall 2024 and meet back after to decide continued treatment          10-year probability of a hip fracture ? 3% or a 10-year probability of a major osteoporosis-related fracture ? 20% warrants treatment.     Orders Placed This Encounter   Procedures    DX Hip/Pelvis/Spine    Vitamin D Deficiency    Comprehensive metabolic panel    C-Telopeptide, Beta-Cross-Linked    C-Telopeptide, Beta-Cross-Linked    Comprehensive metabolic panel    Vitamin D Deficiency       Return to clinic: 1 year    A total of 20 minutes were spent today 10/11/23 on this visit including chart review, history and counseling, documentation and other activities as detailed above.       Answers submitted by the patient for this visit:  Symptoms you have experienced in the last 30 days (Submitted on 10/9/2023)  General Symptoms: No  Skin Symptoms: No  HENT Symptoms: No  EYE SYMPTOMS: No  HEART SYMPTOMS: No  LUNG SYMPTOMS: No  INTESTINAL SYMPTOMS: No  URINARY SYMPTOMS: No  GYNECOLOGIC SYMPTOMS: No  BREAST SYMPTOMS: No  SKELETAL SYMPTOMS: No  BLOOD SYMPTOMS: Yes  NERVOUS SYSTEM SYMPTOMS: No  MENTAL HEALTH SYMPTOMS: No  Please answer the questions below to tell us what condition you are  experiencing: (Submitted on 10/9/2023)  Anemia: No  Swollen glands: No  Easy bleeding or bruising: Yes  Edema or swelling: No

## 2023-10-11 NOTE — NURSING NOTE
"Chief Complaint   Patient presents with    RECHECK     Osteopenia of hip, unspecified laterality +1 more       Vitals:    10/11/23 1535 10/11/23 1601   BP: (!) 145/80 (!) 147/84   BP Location: Left arm Left arm   Patient Position: Sitting Sitting   Cuff Size: Adult Regular Adult Regular   Pulse: 80    Weight: 68 kg (150 lb)    Height: 1.56 m (5' 1.42\")        Body mass index is 27.96 kg/m .    Willem Rico MA    "

## 2023-10-18 ENCOUNTER — LAB (OUTPATIENT)
Dept: LAB | Facility: CLINIC | Age: 73
End: 2023-10-18
Payer: COMMERCIAL

## 2023-10-18 DIAGNOSIS — M81.8 OTHER OSTEOPOROSIS WITHOUT CURRENT PATHOLOGICAL FRACTURE: ICD-10-CM

## 2023-10-18 LAB
ALBUMIN SERPL BCG-MCNC: 4.6 G/DL (ref 3.5–5.2)
ALP SERPL-CCNC: 85 U/L (ref 35–104)
ALT SERPL W P-5'-P-CCNC: 35 U/L (ref 0–50)
ANION GAP SERPL CALCULATED.3IONS-SCNC: 10 MMOL/L (ref 7–15)
AST SERPL W P-5'-P-CCNC: 48 U/L (ref 0–45)
BILIRUB SERPL-MCNC: 0.4 MG/DL
BUN SERPL-MCNC: 6.2 MG/DL (ref 8–23)
CALCIUM SERPL-MCNC: 9.5 MG/DL (ref 8.8–10.2)
CHLORIDE SERPL-SCNC: 103 MMOL/L (ref 98–107)
CREAT SERPL-MCNC: 0.6 MG/DL (ref 0.51–0.95)
DEPRECATED HCO3 PLAS-SCNC: 26 MMOL/L (ref 22–29)
EGFRCR SERPLBLD CKD-EPI 2021: >90 ML/MIN/1.73M2
GLUCOSE SERPL-MCNC: 88 MG/DL (ref 70–99)
POTASSIUM SERPL-SCNC: 4.1 MMOL/L (ref 3.4–5.3)
PROT SERPL-MCNC: 7.5 G/DL (ref 6.4–8.3)
SODIUM SERPL-SCNC: 139 MMOL/L (ref 135–145)
VIT D+METAB SERPL-MCNC: 57 NG/ML (ref 20–50)

## 2023-10-18 PROCEDURE — 82523 COLLAGEN CROSSLINKS: CPT | Mod: 90

## 2023-10-18 PROCEDURE — 36415 COLL VENOUS BLD VENIPUNCTURE: CPT

## 2023-10-18 PROCEDURE — 82306 VITAMIN D 25 HYDROXY: CPT

## 2023-10-18 PROCEDURE — 80053 COMPREHEN METABOLIC PANEL: CPT

## 2023-10-18 PROCEDURE — 99000 SPECIMEN HANDLING OFFICE-LAB: CPT

## 2023-10-20 LAB — COLLAGEN CTX SERPL-MCNC: 225 PG/ML

## 2023-11-03 ENCOUNTER — TELEPHONE (OUTPATIENT)
Dept: FAMILY MEDICINE | Facility: CLINIC | Age: 73
End: 2023-11-03

## 2023-11-03 DIAGNOSIS — E78.01 FAMILIAL HYPERCHOLESTEROLEMIA: ICD-10-CM

## 2023-11-03 RX ORDER — ROSUVASTATIN CALCIUM 10 MG/1
10 TABLET, COATED ORAL AT BEDTIME
Qty: 90 TABLET | Refills: 3 | OUTPATIENT
Start: 2023-11-03

## 2023-11-03 RX ORDER — ROSUVASTATIN CALCIUM 10 MG/1
10 TABLET, COATED ORAL AT BEDTIME
Qty: 90 TABLET | Refills: 3 | Status: CANCELLED | OUTPATIENT
Start: 2023-11-03

## 2023-11-03 NOTE — TELEPHONE ENCOUNTER
Pt should have refills on file at [facility] in [place]. Original script written 09/07/2023 for a years worth of refills.    Please have pt call their pharmacy to refill med.    Kanwal Weir RN

## 2023-11-03 NOTE — TELEPHONE ENCOUNTER
Medication Question or Refill    What medication are you calling about (include dose and sig)?: Rosuvastatin 10 MG tablet    Preferred Pharmacy:  [id number] - [place], [place] - [address] [facility]   [address] [place] [address]  Phone: [phone] Fax: [phone]      Controlled Substance Agreement on file:   CSA -- Patient Level:    CSA: None found at the patient level.       Who prescribed the medication?: Dr. Conti    Do you need a refill? Yes    Do you have any questions or concerns?  No      Could we send this information to you in [facility] or would you prefer to receive a phone call?:   Patient would prefer a phone call   Okay to leave a detailed message?: Yes at Cell number on file:    Telephone Information:   Mobile [phone]

## 2023-12-19 ENCOUNTER — INFUSION THERAPY VISIT (OUTPATIENT)
Dept: INFUSION THERAPY | Facility: CLINIC | Age: 73
End: 2023-12-19
Attending: INTERNAL MEDICINE
Payer: MEDICARE

## 2023-12-19 VITALS
RESPIRATION RATE: 18 BRPM | DIASTOLIC BLOOD PRESSURE: 82 MMHG | HEART RATE: 67 BPM | OXYGEN SATURATION: 98 % | SYSTOLIC BLOOD PRESSURE: 149 MMHG | TEMPERATURE: 97.9 F

## 2023-12-19 DIAGNOSIS — M81.8 OTHER OSTEOPOROSIS WITHOUT CURRENT PATHOLOGICAL FRACTURE: Primary | ICD-10-CM

## 2023-12-19 LAB
ALBUMIN SERPL BCG-MCNC: 4.4 G/DL (ref 3.5–5.2)
CALCIUM SERPL-MCNC: 9.9 MG/DL (ref 8.8–10.2)
CREAT SERPL-MCNC: 0.63 MG/DL (ref 0.51–0.95)
EGFRCR SERPLBLD CKD-EPI 2021: >90 ML/MIN/1.73M2

## 2023-12-19 PROCEDURE — 96365 THER/PROPH/DIAG IV INF INIT: CPT

## 2023-12-19 PROCEDURE — 250N000011 HC RX IP 250 OP 636: Mod: JZ | Performed by: INTERNAL MEDICINE

## 2023-12-19 PROCEDURE — 82040 ASSAY OF SERUM ALBUMIN: CPT | Performed by: INTERNAL MEDICINE

## 2023-12-19 PROCEDURE — 82310 ASSAY OF CALCIUM: CPT | Performed by: INTERNAL MEDICINE

## 2023-12-19 PROCEDURE — 82565 ASSAY OF CREATININE: CPT | Performed by: INTERNAL MEDICINE

## 2023-12-19 PROCEDURE — 250N000013 HC RX MED GY IP 250 OP 250 PS 637: Performed by: INTERNAL MEDICINE

## 2023-12-19 PROCEDURE — 36415 COLL VENOUS BLD VENIPUNCTURE: CPT | Performed by: INTERNAL MEDICINE

## 2023-12-19 RX ORDER — ZOLEDRONIC ACID 5 MG/100ML
5 INJECTION, SOLUTION INTRAVENOUS ONCE
Start: 2024-12-18

## 2023-12-19 RX ORDER — ALBUTEROL SULFATE 90 UG/1
1-2 AEROSOL, METERED RESPIRATORY (INHALATION)
Start: 2024-12-18

## 2023-12-19 RX ORDER — ZOLEDRONIC ACID 5 MG/100ML
5 INJECTION, SOLUTION INTRAVENOUS ONCE
Status: COMPLETED | OUTPATIENT
Start: 2023-12-19 | End: 2023-12-19

## 2023-12-19 RX ORDER — DIPHENHYDRAMINE HYDROCHLORIDE 50 MG/ML
50 INJECTION INTRAMUSCULAR; INTRAVENOUS
Status: CANCELLED
Start: 2024-12-18

## 2023-12-19 RX ORDER — DIPHENHYDRAMINE HYDROCHLORIDE 50 MG/ML
50 INJECTION INTRAMUSCULAR; INTRAVENOUS
Start: 2024-12-18

## 2023-12-19 RX ORDER — METHYLPREDNISOLONE SODIUM SUCCINATE 125 MG/2ML
125 INJECTION, POWDER, LYOPHILIZED, FOR SOLUTION INTRAMUSCULAR; INTRAVENOUS
Start: 2024-12-18

## 2023-12-19 RX ORDER — ALBUTEROL SULFATE 0.83 MG/ML
2.5 SOLUTION RESPIRATORY (INHALATION)
OUTPATIENT
Start: 2024-12-18

## 2023-12-19 RX ORDER — MEPERIDINE HYDROCHLORIDE 50 MG/ML
25 INJECTION INTRAMUSCULAR; INTRAVENOUS; SUBCUTANEOUS EVERY 30 MIN PRN
Status: CANCELLED | OUTPATIENT
Start: 2024-12-18

## 2023-12-19 RX ORDER — ALBUTEROL SULFATE 90 UG/1
1-2 AEROSOL, METERED RESPIRATORY (INHALATION)
Status: CANCELLED
Start: 2024-12-18

## 2023-12-19 RX ORDER — ZOLEDRONIC ACID 5 MG/100ML
5 INJECTION, SOLUTION INTRAVENOUS ONCE
Status: CANCELLED
Start: 2024-12-18

## 2023-12-19 RX ORDER — EPINEPHRINE 1 MG/ML
0.3 INJECTION, SOLUTION INTRAMUSCULAR; SUBCUTANEOUS EVERY 5 MIN PRN
Status: CANCELLED | OUTPATIENT
Start: 2024-12-18

## 2023-12-19 RX ORDER — ACETAMINOPHEN 325 MG/1
325 TABLET ORAL
OUTPATIENT
Start: 2024-12-18

## 2023-12-19 RX ORDER — ACETAMINOPHEN 325 MG/1
325 TABLET ORAL
Status: DISCONTINUED | OUTPATIENT
Start: 2023-12-19 | End: 2023-12-19 | Stop reason: HOSPADM

## 2023-12-19 RX ORDER — HEPARIN SODIUM,PORCINE 10 UNIT/ML
5-20 VIAL (ML) INTRAVENOUS DAILY PRN
OUTPATIENT
Start: 2024-12-18

## 2023-12-19 RX ORDER — EPINEPHRINE 1 MG/ML
0.3 INJECTION, SOLUTION INTRAMUSCULAR; SUBCUTANEOUS EVERY 5 MIN PRN
OUTPATIENT
Start: 2024-12-18

## 2023-12-19 RX ORDER — HEPARIN SODIUM (PORCINE) LOCK FLUSH IV SOLN 100 UNIT/ML 100 UNIT/ML
5 SOLUTION INTRAVENOUS
OUTPATIENT
Start: 2024-12-18

## 2023-12-19 RX ORDER — ACETAMINOPHEN 325 MG/1
325 TABLET ORAL
Status: CANCELLED | OUTPATIENT
Start: 2024-12-18

## 2023-12-19 RX ORDER — HEPARIN SODIUM,PORCINE 10 UNIT/ML
5-20 VIAL (ML) INTRAVENOUS DAILY PRN
Status: CANCELLED | OUTPATIENT
Start: 2024-12-18

## 2023-12-19 RX ORDER — ALBUTEROL SULFATE 0.83 MG/ML
2.5 SOLUTION RESPIRATORY (INHALATION)
Status: CANCELLED | OUTPATIENT
Start: 2024-12-18

## 2023-12-19 RX ORDER — METHYLPREDNISOLONE SODIUM SUCCINATE 125 MG/2ML
125 INJECTION, POWDER, LYOPHILIZED, FOR SOLUTION INTRAMUSCULAR; INTRAVENOUS
Status: CANCELLED
Start: 2024-12-18

## 2023-12-19 RX ORDER — HEPARIN SODIUM (PORCINE) LOCK FLUSH IV SOLN 100 UNIT/ML 100 UNIT/ML
5 SOLUTION INTRAVENOUS
Status: CANCELLED | OUTPATIENT
Start: 2024-12-18

## 2023-12-19 RX ORDER — MEPERIDINE HYDROCHLORIDE 50 MG/ML
25 INJECTION INTRAMUSCULAR; INTRAVENOUS; SUBCUTANEOUS EVERY 30 MIN PRN
OUTPATIENT
Start: 2024-12-18

## 2023-12-19 RX ADMIN — ZOLEDRONIC ACID 5 MG: 5 INJECTION, SOLUTION INTRAVENOUS at 12:26

## 2023-12-19 RX ADMIN — ACETAMINOPHEN 325 MG: 325 TABLET ORAL at 12:25

## 2023-12-19 NOTE — PROGRESS NOTES
Infusion Nursing Note:  Mone Humphreys presents today for Reclast.    Patient seen by provider today: No   present during visit today: Not Applicable.    Note: Mone presents ambulatory to [facility] today for labs and Reclast infusion. She verified that she is taking Calcium and Vitamin D supplements. This is her second Reclast. She reports that she tolerated the first one well. She was premedicated with Tylenol.  She tolerated the infusion well.      Intravenous Access:  Labs drawn without difficulty.  Peripheral IV placed.    Treatment Conditions:  Lab Results   Component Value Date     10/18/2023    POTASSIUM 4.1 10/18/2023    CR 0.63 12/19/2023    NICOLAS 9.9 12/19/2023    BILITOTAL 0.4 10/18/2023    ALBUMIN 4.4 12/19/2023    ALT 35 10/18/2023    AST 48 (H) 10/18/2023       Results reviewed, labs MET treatment parameters, ok to proceed with treatment.      Post Infusion Assessment:  Patient tolerated infusion without incident.  Site patent and intact, free from redness, edema or discomfort.  No evidence of extravasations.  Access discontinued per protocol.       Discharge Plan:   AVS to patient via MYCHART.   Patient discharged in stable condition accompanied by: self.  Departure Mode: Ambulatory.      Opal Ribera RN

## 2024-01-06 NOTE — TELEPHONE ENCOUNTER
Pharmacy requested refills that are already active on file. Refused request to pharmacy.  
[Negative] : Genitourinary

## 2024-03-12 DIAGNOSIS — Z78.0 POSTMENOPAUSAL: Primary | ICD-10-CM

## 2024-03-12 NOTE — PROGRESS NOTES
Referral/Order has been routed via: RIGHTFAX to: [facility].

## 2024-04-09 ENCOUNTER — TRANSFERRED RECORDS (OUTPATIENT)
Dept: MULTI SPECIALTY CLINIC | Facility: CLINIC | Age: 74
End: 2024-04-09

## 2024-04-10 ENCOUNTER — MYC MEDICAL ADVICE (OUTPATIENT)
Dept: FAMILY MEDICINE | Facility: CLINIC | Age: 74
End: 2024-04-10
Payer: COMMERCIAL

## 2024-10-08 ENCOUNTER — VIRTUAL VISIT (OUTPATIENT)
Dept: ENDOCRINOLOGY | Facility: CLINIC | Age: 74
End: 2024-10-08
Payer: COMMERCIAL

## 2024-10-08 DIAGNOSIS — M81.8 OTHER OSTEOPOROSIS WITHOUT CURRENT PATHOLOGICAL FRACTURE: Primary | ICD-10-CM

## 2024-10-08 PROCEDURE — 99213 OFFICE O/P EST LOW 20 MIN: CPT | Mod: 95 | Performed by: INTERNAL MEDICINE

## 2024-10-08 RX ORDER — EPINEPHRINE 1 MG/ML
0.3 INJECTION, SOLUTION, CONCENTRATE INTRAVENOUS EVERY 5 MIN PRN
OUTPATIENT
Start: 2024-12-08

## 2024-10-08 RX ORDER — METHYLPREDNISOLONE SODIUM SUCCINATE 125 MG/2ML
125 INJECTION INTRAMUSCULAR; INTRAVENOUS
Start: 2024-12-08

## 2024-10-08 RX ORDER — DIPHENHYDRAMINE HYDROCHLORIDE 50 MG/ML
50 INJECTION INTRAMUSCULAR; INTRAVENOUS
Start: 2024-12-08

## 2024-10-08 RX ORDER — MEPERIDINE HYDROCHLORIDE 25 MG/ML
25 INJECTION INTRAMUSCULAR; INTRAVENOUS; SUBCUTANEOUS EVERY 30 MIN PRN
OUTPATIENT
Start: 2024-12-08

## 2024-10-08 RX ORDER — ALBUTEROL SULFATE 90 UG/1
1-2 INHALANT RESPIRATORY (INHALATION)
Start: 2024-12-08

## 2024-10-08 RX ORDER — ZOLEDRONIC ACID 0.05 MG/ML
5 INJECTION, SOLUTION INTRAVENOUS ONCE
Start: 2024-12-08

## 2024-10-08 RX ORDER — HEPARIN SODIUM,PORCINE 10 UNIT/ML
5-20 VIAL (ML) INTRAVENOUS DAILY PRN
OUTPATIENT
Start: 2024-12-08

## 2024-10-08 RX ORDER — ALBUTEROL SULFATE 0.83 MG/ML
2.5 SOLUTION RESPIRATORY (INHALATION)
OUTPATIENT
Start: 2024-12-08

## 2024-10-08 RX ORDER — HEPARIN SODIUM (PORCINE) LOCK FLUSH IV SOLN 100 UNIT/ML 100 UNIT/ML
5 SOLUTION INTRAVENOUS
OUTPATIENT
Start: 2024-12-08

## 2024-10-08 NOTE — PROGRESS NOTES
"  Video-Visit Details    Type of service:  Video Visit  Video Start Time: 10:03  Video End Time: 10:16  Originating Location (pt. Location): [place], [place]  Distant Location (provider location):  Home  Platform used for Video Visit: Addison Joyner MD      Mone Humphreys is a 74 year old yo female who presents today for follow-up of osteoporosis via a billable video visit.. She was last seen by me Oct 2023 Of note, she also has past medical history of Hypercholesterolemia, Glaucoma, antiphospholipid syndrome.    Chief Complaint   Patient presents with    RECHECK     Other osteoporosis without current pathological fracture     Has history of gum disease, follows with periodontist, requires interval tissue grafts. Describes the disease as \"pockets\" that are under control. However, was told not to take bisphosphonates for this reason. Wishes she would have started sooner    Overall doing well. Loves reclast, feels it is working well for her.   No falls/fractures  Continuing to take vit d/calcium and staying active      1) Osteoporosis- reviewed and updated with patient  Diagnosis: 50s, routine dexa  Previous Fractures: never  Bone-specific therapy: Fosamax, taken off, Calcitonin nasal spray, Reclast 12/2022, 12/2023  Family History of Hip Fx: Father had stroke in 50s,  hip replacement, unclear why, R side paralyzed, doesn't remember a fall or anything  Hormone History (Menopause, Testosterone): 50s , had heavy menses (double uterus and APS)  Steroids: eye drops, joint injections every 3 months >10 years  PPIs: none  Antiepileptics: none  Anticoagulation: none- plavix  Autoimmune disease: Antiphospholipid Syndrome  CKD: none  Nephrolithiasis: none  Other risk factors: none   Ca/D: Becker Calcium Gummies 500mg BID, Vitamin D  25mg (1000u) daily--- Total 1000mg Ca, 2000u Vit D, Protein shake daily (? Ca supplement), eats cottage cheese or yogurt daily  Exercise: contemporary line dancing 2-3x per week, walk (outside " "or tredmill) daily 45min, many grandchildren and babysit every Friday, yardwork   Smoking: quit 1997  Alcohol: rare  Height loss- Tallest 5'4.5\", Current 5'2\" (rounds to \"3 inches of height loss\")      Relevant Family History:    Active diagnoses this visit:  Other osteoporosis without current pathological fracture     ROS: 10 point ROS neg other than the symptoms noted above in the HPI.      Objective:  LMP  (LMP Unknown)     Physical Exam (visual exam)  VS:  no vital signs taken for video visit  CONSTITUTIONAL: healthy, alert and NAD, responding appropriately  ENT: normocephalic, no visual evidence of trauma, normal nose and oral mucosa  EYES: conjunctivae and sclerae normal, no exophthalmos or proptosis  THYROID:  no visualized nodules or goiter  LUNGS: no audible wheeze, cough or visible cyanosis, no visible retractions or increased work of breathing  EXTREMITIES: no hand tremors  NEUROLOGY: cranial nerves grossly intact with no obvious deficit.  SKIN:  no visualized skin lesions or rash, no edema visualized  PSYCH: mentation appears normal, normal judgement          Lab Results   Component Value Date/Time    TSH 1.57 09/21/2022 08:20 AM    PTHI 41 09/21/2022 08:20 AM     Last Comprehensive Metabolic Panel:  Sodium   Date Value Ref Range Status   10/18/2023 139 135 - 145 mmol/L Final     Comment:     Reference intervals for this test were updated on 09/26/2023 to more accurately reflect our healthy population. There may be differences in the flagging of prior results with similar values performed with this method. Interpretation of those prior results can be made in the context of the updated reference intervals.      Potassium   Date Value Ref Range Status   10/18/2023 4.1 3.4 - 5.3 mmol/L Final   04/26/2022 4.4 3.4 - 5.3 mmol/L Final     Chloride   Date Value Ref Range Status   10/18/2023 103 98 - 107 mmol/L Final   04/26/2022 107 94 - 109 mmol/L Final     Carbon Dioxide (CO2)   Date Value Ref Range Status "   10/18/2023 26 22 - 29 mmol/L Final   04/26/2022 29 20 - 32 mmol/L Final     Anion Gap   Date Value Ref Range Status   10/18/2023 10 7 - 15 mmol/L Final   04/26/2022 4 3 - 14 mmol/L Final     Glucose   Date Value Ref Range Status   10/18/2023 88 70 - 99 mg/dL Final   04/26/2022 95 70 - 99 mg/dL Final     Urea Nitrogen   Date Value Ref Range Status   10/18/2023 6.2 (L) 8.0 - 23.0 mg/dL Final   04/26/2022 8 7 - 30 mg/dL Final     Creatinine   Date Value Ref Range Status   12/19/2023 0.63 0.51 - 0.95 mg/dL Final     GFR Estimate   Date Value Ref Range Status   12/19/2023 >90 >60 mL/min/1.73m2 Final     Calcium   Date Value Ref Range Status   12/19/2023 9.9 8.8 - 10.2 mg/dL Final         Alkaline Phosphatase   Date Value Ref Range Status   10/18/2023 85 35 - 104 U/L Final       DEXA 2019-     DEXA May 2022:          DEXA 4/2024- images reviewed by me today (care everywhere)  DXA RESULTS   -Lumbar Spine: L1-L4: BMD: 1.262 g/cm2. T-score: 0.7. Z-score: 2.5. Degenerative change may artifactually increase BMD.   -RIGHT Hip Total: BMD: 0.790 g/cm2. T-score: -1.7. Z-score: 0.1.   -RIGHT Hip Femoral neck: BMD: 0.731 g/cm2. T-score: -2.2. Z-score: -0.2.   -LEFT Hip Total: BMD: 0.808 g/cm2. T-score: -1.6. Z-score: 0.2.   -LEFT Hip Femoral neck: BMD: 0.721 g/cm2. T-score: -2.3. Z-score: -0.3.       INTERVAL CHANGE   -There has been a 11.4% increase in lumbar spine BMD.   -There has been a 2.5% increase in left hip BMD.       ASSESSMENT / PLAN:  (M85.859) Osteopenia of hip, unspecified laterality      1) Osteoporosis  - Osteopenia with high frax score  - Would recommend 2-4 years bisphosphonate therapy ideally to improve BMD into better range and then have drug holiday.   - Will complete 4 years with reclast-- reordered now and will redose next year  - Continue reclast annually-- next dose December  - Update bone density Fall 2026 and meet back after to decide continued treatment    10-year probability of a hip fracture ? 3% or a  10-year probability of a major osteoporosis-related fracture ? 20% warrants treatment.     Orders Placed This Encounter   Procedures    Creatinine    Albumin level    Vitamin D Deficiency    Calcium       Return to clinic: 1 year    A total of 25 minutes were spent today 10/08/24 on this visit including chart review, history and counseling, documentation and other activities as detailed above.

## 2024-10-08 NOTE — LETTER
"10/8/2024      Mone Humphreys   [facility] [address]      Dear Colleague,    Thank you for referring your patient, Mone Humphreys, to the [facility] [place]. Please see a copy of my visit note below.      Video-Visit Details    Type of service:  Video Visit  Video Start Time: 10:03  Video End Time: 10:16  Originating Location (pt. Location): [place], [place]  Distant Location (provider location):  Home  Platform used for Video Visit: Addison Joyner MD      Mone Humphreys is a 74 year old yo female who presents today for follow-up of osteoporosis via a billable video visit.. She was last seen by me Oct 2023 Of note, she also has past medical history of Hypercholesterolemia, Glaucoma, antiphospholipid syndrome.    Chief Complaint   Patient presents with     RECHECK     Other osteoporosis without current pathological fracture     Has history of gum disease, follows with periodontist, requires interval tissue grafts. Describes the disease as \"pockets\" that are under control. However, was told not to take bisphosphonates for this reason. Wishes she would have started sooner    Overall doing well. Loves reclast, feels it is working well for her.   No falls/fractures  Continuing to take vit d/calcium and staying active      1) Osteoporosis- reviewed and updated with patient  Diagnosis: 50s, routine dexa  Previous Fractures: never  Bone-specific therapy: Fosamax, taken off, Calcitonin nasal spray, Reclast 12/2022, 12/2023  Family History of Hip Fx: Father had stroke in 50s,  hip replacement, unclear why, R side paralyzed, doesn't remember a fall or anything  Hormone History (Menopause, Testosterone): 50s , had heavy menses (double uterus and APS)  Steroids: eye drops, joint injections every 3 months >10 years  PPIs: none  Antiepileptics: none  Anticoagulation: none- plavix  Autoimmune disease: Antiphospholipid Syndrome  CKD: none  Nephrolithiasis: none  Other risk factors: none " "  Ca/D: Becker Calcium Gummies 500mg BID, Vitamin D  25mg (1000u) daily--- Total 1000mg Ca, 2000u Vit D, Protein shake daily (? Ca supplement), eats cottage cheese or yogurt daily  Exercise: contemporary line dancing 2-3x per week, walk (outside or tredmill) daily 45min, many grandchildren and babysit every Friday, yardwork   Smoking: quit 1997  Alcohol: rare  Height loss- Tallest 5'4.5\", Current 5'2\" (rounds to \"3 inches of height loss\")      Relevant Family History:    Active diagnoses this visit:  Other osteoporosis without current pathological fracture     ROS: 10 point ROS neg other than the symptoms noted above in the HPI.      Objective:  LMP  (LMP Unknown)     Physical Exam (visual exam)  VS:  no vital signs taken for video visit  CONSTITUTIONAL: healthy, alert and NAD, responding appropriately  ENT: normocephalic, no visual evidence of trauma, normal nose and oral mucosa  EYES: conjunctivae and sclerae normal, no exophthalmos or proptosis  THYROID:  no visualized nodules or goiter  LUNGS: no audible wheeze, cough or visible cyanosis, no visible retractions or increased work of breathing  EXTREMITIES: no hand tremors  NEUROLOGY: cranial nerves grossly intact with no obvious deficit.  SKIN:  no visualized skin lesions or rash, no edema visualized  PSYCH: mentation appears normal, normal judgement          Lab Results   Component Value Date/Time    TSH 1.57 09/21/2022 08:20 AM    PTHI 41 09/21/2022 08:20 AM     Last Comprehensive Metabolic Panel:  Sodium   Date Value Ref Range Status   10/18/2023 139 135 - 145 mmol/L Final     Comment:     Reference intervals for this test were updated on 09/26/2023 to more accurately reflect our healthy population. There may be differences in the flagging of prior results with similar values performed with this method. Interpretation of those prior results can be made in the context of the updated reference intervals.      Potassium   Date Value Ref Range Status "   10/18/2023 4.1 3.4 - 5.3 mmol/L Final   04/26/2022 4.4 3.4 - 5.3 mmol/L Final     Chloride   Date Value Ref Range Status   10/18/2023 103 98 - 107 mmol/L Final   04/26/2022 107 94 - 109 mmol/L Final     Carbon Dioxide (CO2)   Date Value Ref Range Status   10/18/2023 26 22 - 29 mmol/L Final   04/26/2022 29 20 - 32 mmol/L Final     Anion Gap   Date Value Ref Range Status   10/18/2023 10 7 - 15 mmol/L Final   04/26/2022 4 3 - 14 mmol/L Final     Glucose   Date Value Ref Range Status   10/18/2023 88 70 - 99 mg/dL Final   04/26/2022 95 70 - 99 mg/dL Final     Urea Nitrogen   Date Value Ref Range Status   10/18/2023 6.2 (L) 8.0 - 23.0 mg/dL Final   04/26/2022 8 7 - 30 mg/dL Final     Creatinine   Date Value Ref Range Status   12/19/2023 0.63 0.51 - 0.95 mg/dL Final     GFR Estimate   Date Value Ref Range Status   12/19/2023 >90 >60 mL/min/1.73m2 Final     Calcium   Date Value Ref Range Status   12/19/2023 9.9 8.8 - 10.2 mg/dL Final         Alkaline Phosphatase   Date Value Ref Range Status   10/18/2023 85 35 - 104 U/L Final       DEXA 2019-     DEXA May 2022:          DEXA 4/2024- images reviewed by me today (care everywhere)  DXA RESULTS   -Lumbar Spine: L1-L4: BMD: 1.262 g/cm2. T-score: 0.7. Z-score: 2.5. Degenerative change may artifactually increase BMD.   -RIGHT Hip Total: BMD: 0.790 g/cm2. T-score: -1.7. Z-score: 0.1.   -RIGHT Hip Femoral neck: BMD: 0.731 g/cm2. T-score: -2.2. Z-score: -0.2.   -LEFT Hip Total: BMD: 0.808 g/cm2. T-score: -1.6. Z-score: 0.2.   -LEFT Hip Femoral neck: BMD: 0.721 g/cm2. T-score: -2.3. Z-score: -0.3.       INTERVAL CHANGE   -There has been a 11.4% increase in lumbar spine BMD.   -There has been a 2.5% increase in left hip BMD.       ASSESSMENT / PLAN:  (M85.859) Osteopenia of hip, unspecified laterality      1) Osteoporosis  - Osteopenia with high frax score  - Would recommend 2-4 years bisphosphonate therapy ideally to improve BMD into better range and then have drug holiday.   - Will  complete 4 years with reclast-- reordered now and will redose next year  - Continue reclast annually-- next dose December  - Update bone density Fall 2026 and meet back after to decide continued treatment    10-year probability of a hip fracture = 3% or a 10-year probability of a major osteoporosis-related fracture = 20% warrants treatment.     Orders Placed This Encounter   Procedures     Creatinine     Albumin level     Vitamin D Deficiency     Calcium       Return to clinic: 1 year    A total of 25 minutes were spent today 10/08/24 on this visit including chart review, history and counseling, documentation and other activities as detailed above.         Again, thank you for allowing me to participate in the care of your patient.        Sincerely,        Dena Joyner MD

## 2024-10-08 NOTE — PATIENT INSTRUCTIONS
Infusion Centers    [facility]  [address] [place] [address]  [phone] [facility]  [facility]  [address]  [place] [address]  [phone]   [place]  [facility]  [facility] [address]  [address] [place] [address]  [phone]    [facility]  [address], [address]  [place]  [address]  [phone] [facility]  [facility]  [address]  [place] [address]  [phone]     [facility]  [facility]  [address], [address]  [place] [address]  [phone] [facility]  [address], [address]  [place] [address]  [phone]

## 2024-10-25 DIAGNOSIS — R76.0 ANTICARDIOLIPIN ANTIBODY POSITIVE: ICD-10-CM

## 2024-10-25 DIAGNOSIS — E78.01 FAMILIAL HYPERCHOLESTEROLEMIA: ICD-10-CM

## 2024-10-25 RX ORDER — ROSUVASTATIN CALCIUM 10 MG/1
10 TABLET, COATED ORAL AT BEDTIME
Qty: 90 TABLET | Refills: 0 | Status: SHIPPED | OUTPATIENT
Start: 2024-10-25

## 2024-10-25 RX ORDER — CLOPIDOGREL BISULFATE 75 MG/1
75 TABLET ORAL DAILY
Qty: 90 TABLET | Refills: 0 | Status: SHIPPED | OUTPATIENT
Start: 2024-10-25

## 2024-10-25 NOTE — TELEPHONE ENCOUNTER
One refill sent. Patient needs appointment for medication follow up. Last visit with me 9/2023. AWV and medication refills

## 2024-12-02 RX ORDER — DIPHENHYDRAMINE HYDROCHLORIDE 50 MG/ML
50 INJECTION INTRAMUSCULAR; INTRAVENOUS
Start: 2024-12-08

## 2024-12-02 RX ORDER — MEPERIDINE HYDROCHLORIDE 25 MG/ML
25 INJECTION INTRAMUSCULAR; INTRAVENOUS; SUBCUTANEOUS
OUTPATIENT
Start: 2024-12-08

## 2024-12-02 RX ORDER — METHYLPREDNISOLONE SODIUM SUCCINATE 40 MG/ML
40 INJECTION INTRAMUSCULAR; INTRAVENOUS
Start: 2024-12-08

## 2024-12-02 RX ORDER — EPINEPHRINE 1 MG/ML
0.3 INJECTION, SOLUTION, CONCENTRATE INTRAVENOUS EVERY 5 MIN PRN
OUTPATIENT
Start: 2024-12-08

## 2024-12-02 RX ORDER — DIPHENHYDRAMINE HYDROCHLORIDE 50 MG/ML
25 INJECTION INTRAMUSCULAR; INTRAVENOUS
Start: 2024-12-08

## 2024-12-02 RX ORDER — ALBUTEROL SULFATE 90 UG/1
1-2 INHALANT RESPIRATORY (INHALATION)
Start: 2024-12-08

## 2024-12-02 RX ORDER — ALBUTEROL SULFATE 0.83 MG/ML
2.5 SOLUTION RESPIRATORY (INHALATION)
OUTPATIENT
Start: 2024-12-08

## 2024-12-10 NOTE — PATIENT INSTRUCTIONS
Name:  Carol Xiong /Age/Sex: 1935  (89 y.o. female)   MRN & CSN:  [id number] & [id number] Admission Date/Time: 12/10/2024  9:42 AM   Location:  [place] [address]TR- PCP: Kayden Gonzalez MD       Hospital Day: 1          Referring physician:  Ermelinda Gonzalez MD         Reason for consultation:  Elevated troponin - Type 1  R79.89            Thanks for referral.    Information source: patient    CC;  altered mental status      HPI:   Thank you for involving me in taking  care of Carol Xiong who  is a 89 y.o.year  Old female  Presents with  htn, hyperlipidimea, admitted with altered mental    status, has no chest pain, trop is elevated                   Past medical history:    has a past medical history of Abnormal echocardiogram, GERD (gastroesophageal reflux disease), H/O echocardiogram, H/O echocardiogram, Hyperlipidemia, Hypertension, and Neuropathy.  Past surgical history:   has a past surgical history that includes Spine surgery (); Colon surgery; Hysterectomy, total abdominal; Appendectomy; and Upper gastrointestinal endoscopy (N/A, 2024).  Social History:   reports that she has never smoked. She has never used smokeless tobacco. She reports that she does not currently use alcohol.  Family history:  family history includes Hypertension in her father and mother.    Allergies   Allergen Reactions    Penicillins     Thiazide-Type Diuretics Other (See Comments)     Recurrent hyponatremia//sodium 113 on at 24//avoid thiazides please       atorvastatin (LIPITOR) tablet 40 mg, Nightly  aspirin chewable tablet 81 mg, Daily  sodium chloride flush 0.9 % injection 5-40 mL, 2 times per day  sodium chloride flush 0.9 % injection 5-40 mL, PRN  0.9 % sodium chloride infusion, PRN  potassium chloride (KLOR-CON M) extended release tablet 40 mEq, PRN   Or  potassium bicarb-citric acid (EFFER-K) effervescent tablet 40 mEq, PRN   Or  potassium chloride 10 mEq/100 mL IVPB (Peripheral Line),    Patient Education   Personalized Prevention Plan  You are due for the preventive services outlined below.  Your care team is available to assist you in scheduling these services.  If you have already completed any of these items, please share that information with your care team to update in your medical record.  Health Maintenance Due   Topic Date Due     Zoster (Shingles) Vaccine (2 of 3) 06/05/2014     Diptheria Tetanus Pertussis (DTAP/TDAP/TD) Vaccine (7 - Tdap) 06/24/2015     COVID-19 Vaccine (2 - Moderna series) 03/17/2021       Signs of Hearing Loss      Hearing much better with one ear can be a sign of hearing loss.   Hearing loss is a problem shared by many people. In fact, it is one of the most common health problems, particularly as people age. Most people age 65 and older have some hearing loss. By age 80, almost everyone does. Hearing loss often occurs slowly over the years. So you may not realize your hearing has gotten worse.  Have your hearing checked  Call your healthcare provider if you:    Have to strain to hear normal conversation    Have to watch other people s faces very carefully to follow what they re saying    Need to ask people to repeat what they ve said    Often misunderstand what people are saying    Turn the volume of the television or radio up so high that others complain    Feel that people are mumbling when they re talking to you    Find that the effort to hear leaves you feeling tired and irritated    Notice, when using the phone, that you hear better with one ear than the other  [employer] last reviewed this educational content on 1/1/2020 2000-2021 [employer]. All rights reserved. This information is not intended as a substitute for professional medical care. Always follow your healthcare professional's instructions.

## 2024-12-11 SDOH — HEALTH STABILITY: PHYSICAL HEALTH: ON AVERAGE, HOW MANY DAYS PER WEEK DO YOU ENGAGE IN MODERATE TO STRENUOUS EXERCISE (LIKE A BRISK WALK)?: 4 DAYS

## 2024-12-11 SDOH — HEALTH STABILITY: PHYSICAL HEALTH: ON AVERAGE, HOW MANY MINUTES DO YOU ENGAGE IN EXERCISE AT THIS LEVEL?: 60 MIN

## 2024-12-11 ASSESSMENT — SOCIAL DETERMINANTS OF HEALTH (SDOH): HOW OFTEN DO YOU GET TOGETHER WITH FRIENDS OR RELATIVES?: THREE TIMES A WEEK

## 2024-12-12 ENCOUNTER — OFFICE VISIT (OUTPATIENT)
Dept: FAMILY MEDICINE | Facility: CLINIC | Age: 74
End: 2024-12-12
Payer: COMMERCIAL

## 2024-12-12 VITALS
BODY MASS INDEX: 28.45 KG/M2 | WEIGHT: 150.7 LBS | SYSTOLIC BLOOD PRESSURE: 132 MMHG | TEMPERATURE: 97.9 F | HEIGHT: 61 IN | RESPIRATION RATE: 14 BRPM | DIASTOLIC BLOOD PRESSURE: 72 MMHG | HEART RATE: 71 BPM | OXYGEN SATURATION: 98 %

## 2024-12-12 DIAGNOSIS — M81.8 OTHER OSTEOPOROSIS WITHOUT CURRENT PATHOLOGICAL FRACTURE: ICD-10-CM

## 2024-12-12 DIAGNOSIS — Z00.00 ENCOUNTER FOR MEDICARE ANNUAL WELLNESS EXAM: Primary | ICD-10-CM

## 2024-12-12 DIAGNOSIS — L30.1 DYSHYDROSIS: ICD-10-CM

## 2024-12-12 DIAGNOSIS — D68.61 ANTICARDIOLIPIN SYNDROME (H): ICD-10-CM

## 2024-12-12 DIAGNOSIS — E78.01 FAMILIAL HYPERCHOLESTEROLEMIA: ICD-10-CM

## 2024-12-12 LAB
ALBUMIN SERPL BCG-MCNC: 4.5 G/DL (ref 3.5–5.2)
CALCIUM SERPL-MCNC: 9.8 MG/DL (ref 8.8–10.4)
CHOLEST SERPL-MCNC: 165 MG/DL
CREAT SERPL-MCNC: 0.73 MG/DL (ref 0.51–0.95)
EGFRCR SERPLBLD CKD-EPI 2021: 85 ML/MIN/1.73M2
FASTING STATUS PATIENT QL REPORTED: NORMAL
HDLC SERPL-MCNC: 81 MG/DL
LDLC SERPL CALC-MCNC: 71 MG/DL
NONHDLC SERPL-MCNC: 84 MG/DL
TRIGL SERPL-MCNC: 63 MG/DL
VIT D+METAB SERPL-MCNC: 53 NG/ML (ref 20–50)

## 2024-12-12 RX ORDER — TRIAMCINOLONE ACETONIDE 1 MG/G
OINTMENT TOPICAL 3 TIMES DAILY PRN
Qty: 60 G | Refills: 3 | Status: SHIPPED | OUTPATIENT
Start: 2024-12-12

## 2024-12-12 RX ORDER — ROSUVASTATIN CALCIUM 10 MG/1
10 TABLET, COATED ORAL AT BEDTIME
Qty: 90 TABLET | Refills: 4 | Status: SHIPPED | OUTPATIENT
Start: 2024-12-12

## 2024-12-12 RX ORDER — CLOPIDOGREL BISULFATE 75 MG/1
75 TABLET ORAL DAILY
Qty: 90 TABLET | Refills: 4 | Status: SHIPPED | OUTPATIENT
Start: 2024-12-12

## 2024-12-12 NOTE — PROGRESS NOTES
"Preventive Care Visit  [facility]  Alexus Conti MD, Family Medicine  Dec 12, 2024      Assessment & Plan     Encounter for Medicare annual wellness exam  Health maintenance updated, preventive services reviewed, vaccines discussed, questions are answered, patient encouraged to continue annual wellness visits to review preventive services    Anticardiolipin syndrome (H)  Stable on Plavix, plan is indefinite treatment with Plavix  - clopidogrel (PLAVIX) 75 MG tablet; Take 1 tablet (75 mg) by mouth daily.    Familial hypercholesterolemia  Stable on rosuvastatin, lipid panel today  - Lipid panel reflex to direct LDL Fasting; Future  - rosuvastatin (CRESTOR) 10 MG tablet; Take 1 tablet (10 mg) by mouth at bedtime.  - Lipid panel reflex to direct LDL Fasting    Dyshydrosis  Can use on urticaria, eczema, dry skin  - triamcinolone (KENALOG) 0.1 % external ointment; Apply topically 3 times daily as needed (rash).    Other osteoporosis without current pathological fracture  Follows with endocrinology  - Creatinine  - Albumin level  - Vitamin D Deficiency  - Calcium            BMI  Estimated body mass index is 28.38 kg/m  as calculated from the following:    Height as of this encounter: 1.552 m (5' 1.1\").    Weight as of this encounter: 68.4 kg (150 lb 11.2 oz).       Counseling  Appropriate preventive services were addressed with this patient via screening, questionnaire, or discussion as appropriate for fall prevention, nutrition, physical activity, Tobacco-use cessation, social engagement, weight loss and cognition.  Checklist reviewing preventive services available has been given to the patient.  Reviewed patient's diet, addressing concerns and/or questions.   The patient was provided with written information regarding signs of hearing loss.           Brianda Shore is a 74 year old, presenting for the following:  Annual Visit (Fasting for lab work - ), Cough (Diagnosed with Covid19 " April 2024 - light cough ever since. ), and Derm Problem (Small Bumps / Looks like hives - )        12/12/2024     9:02 AM   Additional Questions   Roomed by Keerthi JACKSON CMA   Accompanied by None         Via the Health Maintenance questionnaire, the patient has reported the following services have been completed DEXA: [place] - [facility] 2024-03-01, this information has been sent to the abstraction team.    Here for AWV  Cough since April,   Rash, occasional urticaria, respond to topical steroids, no new exposures  Med refills  BP has been 130s/80s at home, checks regularly        Health Care Directive  Patient does not have a Health Care Directive: Discussed advance care planning with patient; information given to patient to review.      12/11/2024   General Health   How would you rate your overall physical health? Good   Feel stress (tense, anxious, or unable to sleep) Only a little      (!) STRESS CONCERN      12/11/2024   Nutrition   Diet: Vegetarian/vegan            12/11/2024   Exercise   Days per week of moderate/strenous exercise 4 days   Average minutes spent exercising at this level 60 min            12/11/2024   Social Factors   Frequency of gathering with friends or relatives Three times a week   Worry food won't last until get money to buy more No   Food not last or not have enough money for food? No   Do you have housing? (Housing is defined as stable permanent housing and does not include staying ouside in a car, in a tent, in an abandoned building, in an overnight shelter, or couch-surfing.) Yes   Are you worried about losing your housing? No   Lack of transportation? No   Unable to get utilities (heat,electricity)? No            12/11/2024   Fall Risk   Fallen 2 or more times in the past year? No     No    Trouble with walking or balance? No     No        Patient-reported    Multiple values from one day are sorted in reverse-chronological order          12/11/2024   Activities of Daily Living- Home  Safety   Needs help with the following daily activites None of the above   Safety concerns in the home None of the above            2024   Dental   Dentist two times every year? Yes            2024   Hearing Screening   Hearing concerns? (!) I NEED TO ASK PEOPLE TO SPEAK UP OR REPEAT THEMSELVES.    (!) IT'S HARDER TO UNDERSTAND WOMEN'S VOICES THAN MEN'S VOICES.    (!) IT'S HARD TO FOLLOW A CONVERSATION IN A NOISY RESTAURANT OR CROWDED ROOM.    (!) TROUBLE UNDERSTANDING SOFT OR WHISPERED SPEECH.       Multiple values from one day are sorted in reverse-chronological order         2024   Driving Risk Screening   Patient/family members have concerns about driving No            2024   General Alertness/Fatigue Screening   Have you been more tired than usual lately? No            2024   Urinary Incontinence Screening   Bothered by leaking urine in past 6 months No            2024   TB Screening   Were you born outside of the [place]? No            Today's PHQ-2 Score:       2024     1:17 PM   PHQ-2 (  [employer])   Q1: Little interest or pleasure in doing things 0    Q2: Feeling down, depressed or hopeless 0    PHQ-2 Score 0    Q1: Little interest or pleasure in doing things Not at all   Q2: Feeling down, depressed or hopeless Not at all   PHQ-2 Score 0       Patient-reported           2024   Substance Use   Alcohol more than 3/day or more than 7/wk No   Do you have a current opioid prescription? No   How severe/bad is pain from 1 to 10? 4/10   Do you use any other substances recreationally? No        Social History     Tobacco Use    Smoking status: Former     Current packs/day: 0.00     Average packs/day: 2.0 packs/day for 10.1 years (20.1 ttl pk-yrs)     Types: Cigarettes     Start date: 1966     Quit date: 1976     Years since quittin.8     Passive exposure: Past    Smokeless tobacco: Never   Vaping Use    Vaping status: Never Used   Substance Use Topics     "Alcohol use: Yes     Comment: A few drinks a year    Drug use: Never              ASCVD Risk   The ASCVD Risk score (Rosa CABALLERO, et al., 2019) failed to calculate for the following reasons:    Risk score cannot be calculated because patient has a medical history suggesting prior/existing ASCVD            Reviewed and updated as needed this visit by Provider   Tobacco  Allergies  Meds  Problems  Med Hx  Surg Hx  Fam Hx     Sexual Activity            Current providers sharing in care for this patient include:  Patient Care Team:  Alexus Conti MD as PCP - General (Family Medicine)  Dena Joyner MD as MD (Endocrinology, Diabetes, and Metabolism)  Alexus Conti MD as Assigned PCP  Dena Joyner MD as Assigned Endocrinology Provider    The following health maintenance items are reviewed in Epic and correct as of today:  Health Maintenance   Topic Date Due    ZOSTER IMMUNIZATION (2 of 3) 06/05/2014    RSV VACCINE (1 - 1-dose 75+ series) Never done    DEXA  03/15/2024    INFLUENZA VACCINE (1) 09/01/2024    COVID-19 Vaccine (5 - 2024-25 season) 09/01/2024    LIPID  09/07/2024    MEDICARE ANNUAL WELLNESS VISIT  12/12/2025    ANNUAL REVIEW OF HM ORDERS  12/12/2025    FALL RISK ASSESSMENT  12/12/2025    GLUCOSE  10/18/2026    ADVANCE CARE PLANNING  12/12/2029    DTAP/TDAP/TD IMMUNIZATION (8 - Td or Tdap) 08/02/2033    PHQ-2 (once per calendar year)  Completed    Pneumococcal Vaccine: 65+ Years  Completed    HPV IMMUNIZATION  Aged Out    MENINGITIS IMMUNIZATION  Aged Out    RSV MONOCLONAL ANTIBODY  Aged Out    HEPATITIS C SCREENING  Discontinued    MAMMO SCREENING  Discontinued    COLORECTAL CANCER SCREENING  Discontinued            Objective    Exam  BP (!) 132/92   Pulse 71   Temp 97.9  F (36.6  C)   Resp 14   Ht 1.552 m (5' 1.1\")   Wt 68.4 kg (150 lb 11.2 oz)   LMP  (LMP Unknown)   SpO2 98%   BMI 28.38 kg/m     Estimated body mass index is 28.38 kg/m  as calculated from the " "following:    Height as of this encounter: 1.552 m (5' 1.1\").    Weight as of this encounter: 68.4 kg (150 lb 11.2 oz).    Physical Exam  GENERAL: alert and no distress  EYES: Eyes grossly normal to inspection, PERRL and conjunctivae and sclerae normal  HENT: ear canals and TM's normal, nose and mouth without ulcers or lesions  NECK: no adenopathy, no asymmetry, masses, or scars  RESP: lungs clear to auscultation - no rales, rhonchi or wheezes  CV: regular rate and rhythm, normal S1 S2, no S3 or S4, no murmur, click or rub, no peripheral edema  MS: no gross musculoskeletal defects noted, no edema  SKIN: no suspicious lesions or rashes, scattered excoriated skin lesions on small erythematous   NEURO: Normal strength and tone, mentation intact and speech normal  PSYCH: mentation appears normal, affect normal/bright         12/12/2024   Mini Cog   Clock Draw Score 2 Normal   3 Item Recall 3 objects recalled   Mini Cog Total Score 5                 Signed Electronically by: Alexus Conti MD    "

## 2024-12-12 NOTE — PATIENT INSTRUCTIONS
Consider over the counter allergy medicine such as Zyrtec or Claritin.       Patient Education   Preventive Care Advice   This is general advice given by our system to help you stay healthy. However, your care team may have specific advice just for you. Please talk to your care team about your preventive care needs.  Nutrition  Eat 5 or more servings of fruits and vegetables each day.  Try wheat bread, brown rice and whole grain pasta (instead of white bread, rice, and pasta).  Get enough calcium and vitamin D. Check the label on foods and aim for 100% of the RDA (recommended daily allowance).  Lifestyle  Exercise at least 150 minutes each week  (30 minutes a day, 5 days a week).  Do muscle strengthening activities 2 days a week. These help control your weight and prevent disease.  No smoking.  Wear sunscreen to prevent skin cancer.  Have a dental exam and cleaning every 6 months.  Yearly exams  See your health care team every year to talk about:  Any changes in your health.  Any medicines your care team has prescribed.  Preventive care, family planning, and ways to prevent chronic diseases.  Shots (vaccines)   HPV shots (up to age 26), if you've never had them before.  Hepatitis B shots (up to age 59), if you've never had them before.  COVID-19 shot: Get this shot when it's due.  Flu shot: Get a flu shot every year.  Tetanus shot: Get a tetanus shot every 10 years.  Pneumococcal, hepatitis A, and RSV shots: Ask your care team if you need these based on your risk.  Shingles shot (for age 50 and up)  General health tests  Diabetes screening:  Starting at age 35, Get screened for diabetes at least every 3 years.  If you are younger than age 35, ask your care team if you should be screened for diabetes.  Cholesterol test: At age 39, start having a cholesterol test every 5 years, or more often if advised.  Bone density scan (DEXA): At age 50, ask your care team if you should have this scan for osteoporosis (brittle  Patient's FSGs are uncontrolled due to hyperglycemia on current medication regimen.  Last A1c reviewed-   Lab Results   Component Value Date    HGBA1C 6.0 (H) 10/06/2022     Most recent fingerstick glucose reviewed-   Recent Labs   Lab 04/11/23  1554 04/11/23 2016 04/12/23  0526 04/12/23  1142   POCTGLUCOSE 188* 196* 155* 214*     Current correctional scale  none  Maintain anti-hyperglycemic dose as follows-   Antihyperglycemics (From admission, onward)    None      Plan:  -Hold Oral hypoglycemics while patient is in the hospital  - start low dose SSI   -Hyperglycemic protocol   bones).  Hepatitis C: Get tested at least once in your life.  STIs (sexually transmitted infections)  Before age 24: Ask your care team if you should be screened for STIs.  After age 24: Get screened for STIs if you're at risk. You are at risk for STIs (including HIV) if:  You are sexually active with more than one person.  You don't use condoms every time.  You or a partner was diagnosed with a sexually transmitted infection.  If you are at risk for HIV, ask about PrEP medicine to prevent HIV.  Get tested for HIV at least once in your life, whether you are at risk for HIV or not.  Cancer screening tests  Cervical cancer screening: If you have a cervix, begin getting regular cervical cancer screening tests starting at age 21.  Breast cancer scan (mammogram): If you've ever had breasts, begin having regular mammograms starting at age 40. This is a scan to check for breast cancer.  Colon cancer screening: It is important to start screening for colon cancer at age 45.  Have a colonoscopy test every 10 years (or more often if you're at risk) Or, ask your provider about stool tests like a FIT test every year or Cologuard test every 3 years.  To learn more about your testing options, visit:   .  For help making a decision, visit:   https://bit.ly/ta37928.  Prostate cancer screening test: If you have a prostate, ask your care team if a prostate cancer screening test (PSA) at age 55 is right for you.  Lung cancer screening: If you are a current or former smoker ages 50 to 80, ask your care team if ongoing lung cancer screenings are right for you.  For informational purposes only. Not to replace the advice of your health care provider. Copyright   2023 [employer]. All rights reserved. Clinically reviewed by the [facility] Transitions Program. [employer] 304317 - REV 01/24.  Hearing Loss: Care Instructions  Overview     Hearing loss is a sudden or slow decrease in how well you hear. It can range from  slight to profound. Permanent hearing loss can occur with aging. It also can happen when you are exposed long-term to loud noise. Examples include listening to loud music, riding motorcycles, or being around other loud machines.  Hearing loss can affect your work and home life. It can make you feel lonely or depressed. You may feel that you have lost your independence. But hearing aids and other devices can help you hear better and feel connected to others.  Follow-up care is a key part of your treatment and safety. Be sure to make and go to all appointments, and call your doctor if you are having problems. It's also a good idea to know your test results and keep a list of the medicines you take.  How can you care for yourself at home?  Avoid loud noises whenever possible. This helps keep your hearing from getting worse.  Always wear hearing protection around loud noises.  Wear a hearing aid as directed.  A professional can help you pick a hearing aid that will work best for you.  You can also get hearing aids over the counter for mild to moderate hearing loss.  Have hearing tests as your doctor suggests. They can show whether your hearing has changed. Your hearing aid may need to be adjusted.  Use other devices as needed. These may include:  Telephone amplifiers and hearing aids that can connect to a television, stereo, radio, or microphone.  Devices that use lights or vibrations. These alert you to the doorbell, a ringing telephone, or a baby monitor.  Television closed-captioning. This shows the words at the bottom of the screen. Most new TVs can do this.  TTY (text telephone). This lets you type messages back and forth on the telephone instead of talking or listening. These devices are also called TDD. When messages are typed on the keyboard, they are sent over the phone line to a receiving TTY. The message is shown on a monitor.  Use text messaging, social media, and email if it is hard for you to communicate  "by telephone.  Try to learn a listening technique called speechreading. It is not lipreading. You pay attention to people's gestures, expressions, posture, and tone of voice. These clues can help you understand what a person is saying. Face the person you are talking to, and have them face you. Make sure the lighting is good. You need to see the other person's face clearly.  Think about counseling if you need help to adjust to your hearing loss.  When should you call for help?  Watch closely for changes in your health, and be sure to contact your doctor if:    You think your hearing is getting worse.     You have new symptoms, such as dizziness or nausea.   Where can you learn more?  Go to https://www.[employer].net/patiented  Enter R798 in the search box to learn more about \"Hearing Loss: Care Instructions.\"  Current as of: September 27, 2023  Content Version: 14.2 2024 [employer].   Care instructions adapted under license by your healthcare professional. If you have questions about a medical condition or this instruction, always ask your healthcare professional. [employer] disclaims any warranty or liability for your use of this information.       "

## 2025-05-12 ENCOUNTER — ANCILLARY PROCEDURE (OUTPATIENT)
Dept: GENERAL RADIOLOGY | Facility: CLINIC | Age: 75
End: 2025-05-12
Attending: FAMILY MEDICINE
Payer: COMMERCIAL

## 2025-05-12 ENCOUNTER — OFFICE VISIT (OUTPATIENT)
Dept: FAMILY MEDICINE | Facility: CLINIC | Age: 75
End: 2025-05-12
Payer: COMMERCIAL

## 2025-05-12 VITALS
OXYGEN SATURATION: 98 % | TEMPERATURE: 98.6 F | SYSTOLIC BLOOD PRESSURE: 148 MMHG | HEART RATE: 73 BPM | DIASTOLIC BLOOD PRESSURE: 84 MMHG | HEIGHT: 61 IN | RESPIRATION RATE: 10 BRPM | BODY MASS INDEX: 30 KG/M2 | WEIGHT: 158.9 LBS

## 2025-05-12 DIAGNOSIS — I51.7 CARDIOMEGALY: ICD-10-CM

## 2025-05-12 DIAGNOSIS — E78.01 FAMILIAL HYPERCHOLESTEROLEMIA: ICD-10-CM

## 2025-05-12 DIAGNOSIS — M17.11 PRIMARY OSTEOARTHRITIS OF RIGHT KNEE: Primary | ICD-10-CM

## 2025-05-12 DIAGNOSIS — R05.3 CHRONIC COUGH: ICD-10-CM

## 2025-05-12 DIAGNOSIS — I10 BENIGN ESSENTIAL HYPERTENSION: ICD-10-CM

## 2025-05-12 DIAGNOSIS — M85.80 OSTEOPENIA, UNSPECIFIED LOCATION: ICD-10-CM

## 2025-05-12 DIAGNOSIS — R93.89 ABNORMAL FINDING ON CHEST XRAY: ICD-10-CM

## 2025-05-12 DIAGNOSIS — D68.61 ANTICARDIOLIPIN SYNDROME: ICD-10-CM

## 2025-05-12 DIAGNOSIS — Z01.818 PREOP GENERAL PHYSICAL EXAM: ICD-10-CM

## 2025-05-12 DIAGNOSIS — H40.2290 CHRONIC PRIMARY ANGLE-CLOSURE GLAUCOMA, UNSPECIFIED GLAUCOMA STAGE, UNSPECIFIED LATERALITY: ICD-10-CM

## 2025-05-12 LAB
ERYTHROCYTE [DISTWIDTH] IN BLOOD BY AUTOMATED COUNT: 12.5 % (ref 10–15)
HCT VFR BLD AUTO: 39.7 % (ref 35–47)
HGB BLD-MCNC: 13.2 G/DL (ref 11.7–15.7)
HOLD SPECIMEN: NORMAL
MCH RBC QN AUTO: 31.2 PG (ref 26.5–33)
MCHC RBC AUTO-ENTMCNC: 33.2 G/DL (ref 31.5–36.5)
MCV RBC AUTO: 94 FL (ref 78–100)
PLATELET # BLD AUTO: 325 10E3/UL (ref 150–450)
RBC # BLD AUTO: 4.23 10E6/UL (ref 3.8–5.2)
WBC # BLD AUTO: 4.5 10E3/UL (ref 4–11)

## 2025-05-12 PROCEDURE — 93000 ELECTROCARDIOGRAM COMPLETE: CPT | Performed by: FAMILY MEDICINE

## 2025-05-12 PROCEDURE — 3079F DIAST BP 80-89 MM HG: CPT | Performed by: FAMILY MEDICINE

## 2025-05-12 PROCEDURE — 36415 COLL VENOUS BLD VENIPUNCTURE: CPT | Performed by: FAMILY MEDICINE

## 2025-05-12 PROCEDURE — 85027 COMPLETE CBC AUTOMATED: CPT | Performed by: FAMILY MEDICINE

## 2025-05-12 PROCEDURE — G2211 COMPLEX E/M VISIT ADD ON: HCPCS | Performed by: FAMILY MEDICINE

## 2025-05-12 PROCEDURE — 3077F SYST BP >= 140 MM HG: CPT | Performed by: FAMILY MEDICINE

## 2025-05-12 PROCEDURE — 71046 X-RAY EXAM CHEST 2 VIEWS: CPT | Mod: TC | Performed by: RADIOLOGY

## 2025-05-12 PROCEDURE — 82565 ASSAY OF CREATININE: CPT | Performed by: FAMILY MEDICINE

## 2025-05-12 PROCEDURE — 99214 OFFICE O/P EST MOD 30 MIN: CPT | Performed by: FAMILY MEDICINE

## 2025-05-12 RX ORDER — METOPROLOL SUCCINATE 25 MG/1
25 TABLET, EXTENDED RELEASE ORAL DAILY
Qty: 30 TABLET | Refills: 11 | Status: SHIPPED | OUTPATIENT
Start: 2025-05-12

## 2025-05-12 NOTE — PROGRESS NOTES
Preoperative Evaluation  [address] [address]  Phone: [phone]  Fax: [phone]  Primary Provider: Alexus Conti MD  Pre-op Performing Provider: Alexus Conti MD  May 12, 2025             5/11/2025   Surgical Information   What procedure is being done? RIGHT TOTAL KNEE ARTHROPLASTY    Facility or Hospital where procedure/surgery will be performed: [facility]   Who is doing the procedure / surgery? Kirill Irby    Date of surgery / procedure: May 22, 2025   Time of surgery / procedure: TBD   Where do you plan to recover after surgery? at home with family     Fax number for surgical facility: Note does not need to be faxed, will be available electronically in Epic.    Assessment & Plan     The proposed surgical procedure is considered INTERMEDIATE risk.    Primary osteoarthritis of right knee  Stable to proceed with knee replacement  - Basic Metabolic Panel; Future  - CBC w/ Reflex to Iron Studies; Future  - EKG 12-lead complete w/read - Clinics  - Basic Metabolic Panel  - CBC w/ Reflex to Iron Studies    Preop general physical exam  Stable to proceed with knee replacement  - Basic Metabolic Panel; Future  - CBC w/ Reflex to Iron Studies; Future  - EKG 12-lead complete w/read - Clinics  - Basic Metabolic Panel  - CBC w/ Reflex to Iron Studies    Familial hypercholesterolemia  On statin, can continue up until surgery  - Basic Metabolic Panel; Future  - CBC w/ Reflex to Iron Studies; Future  - EKG 12-lead complete w/read - Clinics  - Basic Metabolic Panel  - CBC w/ Reflex to Iron Studies    Anticardiolipin syndrome  On clopidogrel, recommend stopping 5 to 7 days before surgery, postop anticoagulation to be managed by orthopedics  - Basic Metabolic Panel; Future  - CBC w/ Reflex to Iron Studies; Future  - EKG 12-lead complete w/read - Clinics  - Basic Metabolic Panel  - CBC w/ Reflex to Iron Studies    Chronic  primary angle-closure glaucoma, unspecified glaucoma stage, unspecified laterality  Stable, follows with ophthalmology    Osteopenia, unspecified location  Last DEXA scan showed improvement to osteopenia  DEXA results from April 2024  DXA RESULTS   -Lumbar Spine: L1-L4: BMD: 1.262 g/cm2. T-score: 0.7. Z-score: 2.5. Degenerative change may artifactually increase BMD.   -RIGHT Hip Total: BMD: 0.790 g/cm2. T-score: -1.7. Z-score: 0.1.   -RIGHT Hip Femoral neck: BMD: 0.731 g/cm2. T-score: -2.2. Z-score: -0.2.   -LEFT Hip Total: BMD: 0.808 g/cm2. T-score: -1.6. Z-score: 0.2.   -LEFT Hip Femoral neck: BMD: 0.721 g/cm2. T-score: -2.3. Z-score: -0.3.     Chronic cough  Respiratory exam and chest x-ray normal by my evaluation, radiology report pending, chronic cough could be related to allergies or postnasal drainage.  If not improving could consider referral for further evaluation  - XR Chest 2 Views; Future    Benign essential hypertension  Blood pressure continues to run slightly high, will start on beta-blocker at this time.  Avoid ACE inhibitor given ongoing issues with cough, could consider diuretic in the future.  Monitor blood pressure at home and follow-up as needed.  Instructed to take medication on the day of surgery.  - metoprolol succinate ER (TOPROL XL) 25 MG 24 hr tablet; Take 1 tablet (25 mg) by mouth daily.            - No identified additional risk factors other than previously addressed    Preoperative Medication Instructions  Antiplatelet or Anticoagulation Medication Instructions   - clopidrogel (Plavix), prasugrel (Effient), ticagrelor (Brilinta): No contraindication to stopping Plavix, DO NOT TAKE 5-7 days before surgery.     Additional Medication Instructions  Take all scheduled medications on the day of surgery EXCEPT for modifications listed below:   - Herbal medications and vitamins: DO NOT TAKE 14 days prior to surgery.    Recommendation  Approval given to proceed with proposed procedure, without  further diagnostic evaluation.        Brianda Shore is a 75 year old, presenting for the following:  Pre-Op Exam (DOS 05/22/2025, [facility], Kirill Owens, Total Right Knee.) and Cough (Continued - Requesting XR, sister was diagnosed with inoperable lung cancer - non smoker. )        HPI: patient with chronic knee pain, had prior left knee replacement  Overall has been doing well.  Has been a little more stressed due to younger sister's recent lung cancer diagnosis.  Patient does note that she has had a chronic cough since she had COVID a year ago.  Has not been getting worse but not improving.  Not short of breath.  Otherwise generally has been feeling well.  Blood pressure has been running high at home, typically systolic in the 140s and diastolic in the 80s or 90s            5/11/2025   Pre-Op Questionnaire   Have you ever had a heart attack or stroke? No   Have you ever had surgery on your heart or blood vessels, such as a stent placement, a coronary artery bypass, or surgery on an artery in your head, neck, heart, or legs? No   Do you have chest pain with activity? No   Do you have a history of heart failure? No   Do you currently have a cold, bronchitis or symptoms of other infection? No   Do you have a cough, shortness of breath, or wheezing? (!) YES has had chronic cough since COVID one year ago, will do CXR today   Do you or anyone in your family have previous history of blood clots? (!) YES personal and family history of antiphospholipid antibody syndrome, on chronic Plavix   Do you or does anyone in your family have a serious bleeding problem such as prolonged bleeding following surgeries or cuts? No   Have you ever had problems with anemia or been told to take iron pills? (!) YES as a child   Have you had any abnormal blood loss such as black, tarry or bloody stools, or abnormal vaginal bleeding? No   Have you ever had a blood transfusion? (!) YES at time of c-sections   Have you ever had a  transfusion reaction? No   Are you willing to have a blood transfusion if it is medically needed before, during, or after your surgery? Yes   Have you or any of your relatives ever had problems with anesthesia? No   Do you have sleep apnea, excessive snoring or daytime drowsiness? No   Do you have any artifical heart valves or other implanted medical devices like a pacemaker, defibrillator, or continuous glucose monitor? No   Do you have artificial joints? (!) YES - prior left knee replacement   Are you allergic to latex? No     Advance Care Planning    Discussed advance care planning with patient; informed AVS has link to [facility].    Preoperative Review of    reviewed - no current prescriptions, had one prescription for cough syrup with codeine in 2025          Patient Active Problem List    Diagnosis Date Noted    Anticardiolipin syndrome 2023     Priority: Medium    Other osteoporosis without current pathological fracture 2022     Priority: Medium    Allergic rhinitis 2022     Priority: Medium    Familial hypercholesterolemia 2022     Priority: Medium    Primary angle-closure glaucoma 2022     Priority: Medium      Past Medical History:   Diagnosis Date    Arthritis ?    Cerebral infarction (H) Early  s    Small strokes noted in MRI    History of blood transfusion 1970 s    2 C-sections     Past Surgical History:   Procedure Laterality Date    APPENDECTOMY  1974    done electively at time of     BUNIONECTOMY Right 2000     SECTION  1974     SECTION  1978    COLONOSCOPY  2016    EYE SURGERY  2018    Opened drainage holes    LAPAROSCOPIC ASSISTED HYSTERECTOMY VAGINAL, BILATERAL SALPINGO-OOPHORECTOMY, COMBINED      TONSILLECTOMY & ADENOIDECTOMY      TOTAL KNEE ARTHROPLASTY Left      Current Outpatient Medications   Medication Sig Dispense Refill    acetaminophen (TYLENOL) 500 MG tablet Take 1,000 mg by mouth as  needed      calcium carbonate-vitamin D (CALTRATE) 600-10 MG-MCG per tablet Take 1 tablet by mouth      clopidogrel (PLAVIX) 75 MG tablet Take 1 tablet (75 mg) by mouth daily. 90 tablet 4    metoprolol succinate ER (TOPROL XL) 25 MG 24 hr tablet Take 1 tablet (25 mg) by mouth daily. 30 tablet 11    prednisoLONE acetate (PRED FORTE) 1 % ophthalmic suspension Apply 1 drop to eye as needed      rosuvastatin (CRESTOR) 10 MG tablet Take 1 tablet (10 mg) by mouth at bedtime. 90 tablet 4    triamcinolone (KENALOG) 0.1 % external ointment Apply topically 3 times daily as needed (rash). 60 g 3    Zoledronic Acid (RECLAST IV) Inject 5 mg into the vein once annually         Allergies   Allergen Reactions    Atorvastatin Muscle Pain (Myalgia)    Lisinopril Cough    Nickel Rash        Social History     Tobacco Use    Smoking status: Former     Current packs/day: 0.00     Average packs/day: 2.0 packs/day for 10.1 years (20.1 ttl pk-yrs)     Types: Cigarettes     Start date: 1966     Quit date: 1976     Years since quittin.3     Passive exposure: Past    Smokeless tobacco: Never   Substance Use Topics    Alcohol use: Yes     Comment: A few drinks a year       History   Drug Use Unknown             Review of Systems  CONSTITUTIONAL: NEGATIVE for fever, chills, change in weight  INTEGUMENTARY/SKIN: NEGATIVE for worrisome rashes, moles or lesions  EYES: NEGATIVE for vision changes or irritation  ENT/MOUTH: NEGATIVE for ear, mouth and throat problems  RESP:has had chronic cough for a week, NEGATIVE for shortness of breath  CV: NEGATIVE for chest pain, palpitations or peripheral edema  GI: NEGATIVE for nausea, abdominal pain, heartburn, or change in bowel habits  : NEGATIVE for frequency, dysuria, or hematuria  MUSCULOSKELETAL: NEGATIVE for significant arthralgias or myalgia  NEURO: NEGATIVE for weakness, dizziness or paresthesias  ENDOCRINE: NEGATIVE for temperature intolerance, skin/hair changes  HEME: NEGATIVE for  "bleeding problems  PSYCHIATRIC: NEGATIVE for changes in mood or affect    Objective    BP (!) 152/92   Pulse 73   Temp 98.6  F (37  C)   Resp 10   Ht 1.556 m (5' 1.25\")   Wt 72.1 kg (158 lb 14.4 oz)   LMP  (LMP Unknown)   SpO2 98%   BMI 29.78 kg/m     Estimated body mass index is 29.78 kg/m  as calculated from the following:    Height as of this encounter: 1.556 m (5' 1.25\").    Weight as of this encounter: 72.1 kg (158 lb 14.4 oz).  Physical Exam  GENERAL: alert and no distress  EYES: Eyes grossly normal to inspection, PERRL and conjunctivae and sclerae normal  HENT: ear canals and TM's normal, nose and mouth without ulcers or lesions  NECK: no adenopathy, no asymmetry, masses, or scars  RESP: lungs clear to auscultation - no rales, rhonchi or wheezes  CV: regular rate and rhythm, normal S1 S2, no S3 or S4, no murmur, click or rub, no peripheral edema  ABDOMEN: soft, nontender, no hepatosplenomegaly, no masses and bowel sounds normal  MS: no gross musculoskeletal defects noted, no edema  SKIN: no suspicious lesions or rashes  NEURO: Normal strength and tone, mentation intact and speech normal  PSYCH: mentation appears normal, affect normal/bright    Recent Labs   Lab Test 12/12/24  0938   CR 0.73        Diagnostics  Labs pending at this time.  Results will be reviewed when available.   EKG: appears normal, NSR, normal axis, normal intervals, no acute ST/T changes c/w ischemia, no LVH by voltage criteria, unchanged from previous tracings    Revised Cardiac Risk Index (RCRI)  The patient has the following serious cardiovascular risks for perioperative complications:   - No serious cardiac risks = 0 points     RCRI Interpretation: 0 points: Class I (very low risk - 0.4% complication rate)         Signed Electronically by: Alexus Conti MD  A copy of this evaluation report is provided to the requesting physician.         "

## 2025-05-12 NOTE — PATIENT INSTRUCTIONS
How to Take Your Medication Before Surgery  Preoperative Medication Instructions   Antiplatelet or Anticoagulation Medication Instructions   - clopidrogel (Plavix), prasugrel (Effient), ticagrelor (Brilinta): No contraindication to stopping Plavix, DO NOT TAKE 5-7 days before surgery.     Additional Medication Instructions  Take all scheduled medications on the day of surgery EXCEPT for modifications listed below:   - Herbal medications and vitamins: DO NOT TAKE 14 days prior to surgery.       Patient Education   Preparing for Your Surgery  For Adults  Getting started  In most cases, a nurse will call to review your health history and instructions. They will give you an arrival time based on your scheduled surgery time. Please be ready to share:  Your doctor's clinic name and phone number  Your medical, surgical, and anesthesia history  A list of allergies and sensitivities  A list of medicines, including herbal treatments and over-the-counter drugs  Whether the patient has a legal guardian (ask how to send us the papers in advance)  Note: You may not receive a call if you were seen at our PAC (Preoperative Assessment Center).  Please tell us if you're pregnant--or if there's any chance you might be pregnant. Some surgeries may injure a fetus (unborn baby), so they require a pregnancy test. Surgeries that are safe for a fetus don't always need a test, and you can choose whether to have one.   Preparing for surgery  Within 10 to 30 days of surgery: Have a pre-op exam (sometimes called an H&P, or History and Physical). This can be done at a clinic or pre-operative center.  If you're having a , you may not need this exam. Talk to your care team.  At your pre-op exam, talk to your care team about all medicines you take. (This includes CBD oil and any drugs, such as THC, marijuana, and other forms of cannabis.) If you need to stop any medicine before surgery, ask when to start taking it again.  This is for your  safety. Many medicines and drugs can make you bleed too much during surgery. Some change how well surgery (anesthesia) drugs work.  Call your insurance company to let them know you're having surgery. (If you don't have insurance, call [phone].)  Call your clinic if there's any change in your health. This includes a scrape or scratch near the surgery site, or any signs of a cold (sore throat, runny nose, cough, rash, fever).  Eating and drinking guidelines  For your safety: Unless your surgeon tells you otherwise, follow the guidelines below.  Eat and drink as normal until 8 hours before you arrive for surgery. After that, no food or milk. You can spit out gum when you arrive.  Drink clear liquids until 2 hours before you arrive. These are liquids you can see through, like water, Gatorade, and Propel Water. They also include plain black coffee and tea (no cream or milk).  No alcohol for 24 hours before you arrive. The night before surgery, stop any drinks that contain THC.  If your care team tells you to take medicine on the morning of surgery, it's okay to take it with a sip of water. No other medicines or drugs are allowed (including CBD oil)--follow your care team's instructions.  If you have questions the day of surgery, call your hospital or surgery center.   Preventing infection  Shower or bathe the night before and the morning of surgery. Follow the instructions your clinic gave you. (If no instructions, use regular soap.)  Don't shave or clip hair near your surgery site. We'll remove the hair if needed.  Don't smoke or vape the morning of surgery. No chewing tobacco for 6 hours before you arrive. A nicotine patch is okay. You may spit out nicotine gum when you arrive.  For some surgeries, the surgeon will tell you to fully quit smoking and nicotine.  We will make every effort to keep you safe from infection. We will:  Clean our hands often with soap and water (or an alcohol-based hand rub).  Clean the  skin at your surgery site with a special soap that kills germs.  Give you a special gown to keep you warm. (Cold raises the risk of infection.)  Wear hair covers, masks, gowns, and gloves during surgery.  Give antibiotic medicine, if prescribed. Not all surgeries need this medicine.  What to bring on the day of surgery  Photo ID and insurance card  Copy of your health care directive, if you have one  Glasses and hearing aids (bring cases)  You can't wear contacts during surgery  Inhaler and eye drops, if you use them (tell us about these when you arrive)  CPAP machine or breathing device, if you use them  A few personal items, if spending the night  If you have . . .  A pacemaker, ICD (cardiac defibrillator), or other implant: Bring the ID card.  An implanted stimulator: Bring the remote control.  A legal guardian: Bring a copy of the certified (court-stamped) guardianship papers.  Please remove any jewelry, including body piercings. Leave jewelry and other valuables at home.  If you're going home the day of surgery  You must have a responsible adult drive you home. They should stay with you overnight as well.  If you don't have someone to stay with you, and you aren't safe to go home alone, we may keep you overnight. Insurance often won't pay for this.  After surgery  If it's hard to control your pain or you need more pain medicine, please call your surgeon's office.  Questions?   If you have any questions for your care team, list them here:   ____________________________________________________________________________________________________________________________________________________________________________________________________________________________________________________________  For informational purposes only. Not to replace the advice of your health care provider. Copyright   2003, 2019 [facility]. All rights reserved. Clinically reviewed by Jan Sotelo MD. [employer] 004683 -  REV 08/24.

## 2025-05-13 ENCOUNTER — RESULTS FOLLOW-UP (OUTPATIENT)
Dept: FAMILY MEDICINE | Facility: CLINIC | Age: 75
End: 2025-05-13
Payer: COMMERCIAL

## 2025-05-13 LAB
ANION GAP SERPL CALCULATED.3IONS-SCNC: 10 MMOL/L (ref 7–15)
BUN SERPL-MCNC: 12.9 MG/DL (ref 8–23)
CALCIUM SERPL-MCNC: 9.7 MG/DL (ref 8.8–10.4)
CHLORIDE SERPL-SCNC: 101 MMOL/L (ref 98–107)
CREAT SERPL-MCNC: 0.69 MG/DL (ref 0.51–0.95)
EGFRCR SERPLBLD CKD-EPI 2021: 89 ML/MIN/1.73M2
GLUCOSE SERPL-MCNC: 89 MG/DL (ref 70–99)
HCO3 SERPL-SCNC: 24 MMOL/L (ref 22–29)
POTASSIUM SERPL-SCNC: 4.4 MMOL/L (ref 3.4–5.3)
SODIUM SERPL-SCNC: 135 MMOL/L (ref 135–145)

## 2025-05-28 ENCOUNTER — OFFICE VISIT (OUTPATIENT)
Dept: FAMILY MEDICINE | Facility: CLINIC | Age: 75
End: 2025-05-28
Payer: COMMERCIAL

## 2025-05-28 VITALS
HEIGHT: 61 IN | SYSTOLIC BLOOD PRESSURE: 92 MMHG | TEMPERATURE: 97.5 F | HEART RATE: 63 BPM | BODY MASS INDEX: 30.4 KG/M2 | WEIGHT: 161 LBS | RESPIRATION RATE: 20 BRPM | DIASTOLIC BLOOD PRESSURE: 58 MMHG | OXYGEN SATURATION: 97 %

## 2025-05-28 DIAGNOSIS — Z96.651 S/P TOTAL KNEE ARTHROPLASTY, RIGHT: Primary | ICD-10-CM

## 2025-05-28 DIAGNOSIS — D68.61 ANTICARDIOLIPIN SYNDROME: ICD-10-CM

## 2025-05-28 RX ORDER — AMOXICILLIN 250 MG
1 CAPSULE ORAL
COMMUNITY
Start: 2025-05-22

## 2025-05-28 RX ORDER — OXYCODONE HYDROCHLORIDE 5 MG/1
5-10 TABLET ORAL EVERY 4 HOURS PRN
COMMUNITY
Start: 2025-05-22

## 2025-05-28 NOTE — PROGRESS NOTES
"  Assessment & Plan     S/P total knee arthroplasty, right  Elective right knee arthroplasty this past week -no significant postoperative complications  Postprocedural hemoglobin 10.3  Adequate pain control with oral oxycodone, Tylenol  Using senna docusate for stool softener  Participating in outpatient physical therapy    Anticardiolipin syndrome  Resumed on prior to surgery clopidogrel    History of hypertension  Advised to continue monitoring blood pressures for now; recent introduction of Toprol-XL  In clinic blood pressures notably low today I suspect more related to her underlying analgesia      MED REC REQUIRED  Post Medication Reconciliation Status:  Discharge medications reconciled, continue medications without change  BMI  Estimated body mass index is 30.17 kg/m  as calculated from the following:    Height as of this encounter: 1.556 m (5' 1.25\").    Weight as of this encounter: 73 kg (161 lb).         Brianda Shore is a 75 year old, presenting for the following health issues: Returns for hospital follow-up.  Had elective right total knee arthroplasty completed this past week locally.  Familiar with the procedure; 2 years prior had her left knee done.  No notable postoperative complications.  Resumed on her clopidogrel for continued use and for DVT prophylaxis purposes.  Has started in clinic physical therapy.  Has planned 2-week orthopedic follow-up this coming week.  Started on Toprol-XL prior to surgery related to persistent elevated blood pressures.  Has noted significant hypotension after surgery that appears to be analgesia med related.  Good support at home.  No specific questions or concerns  Hospital F/U        5/28/2025    10:47 AM   Additional Questions   Roomed by Genesis HLITON          Hospital Follow-up Visit:    Hospital/Nursing Home/IP Rehab Facility: [facility]  Date of Admission: 5/22/2025  Date of Discharge: 5/23/2025  Reason(s) for Admission: Primary " "osteoarthritis of right knee   Do you have any other stressors you would like to discuss with your provider? No    Problems taking medications regularly:  None  Medication changes since discharge: None  Problems adhering to non-medication therapy:  None    Summary of hospitalization:  CareEverywhere information obtained and reviewed  Diagnostic Tests/Treatments reviewed.  Follow up needed: none  Other Healthcare Providers Involved in Patient s Care:         None  Update since discharge: improved.         Plan of care communicated with patient               Review of Systems  Constitutional, HEENT, cardiovascular, pulmonary, gi and gu systems are negative, except as otherwise noted.      Objective    BP 92/58   Pulse 63   Temp 97.5  F (36.4  C)   Resp 20   Ht 1.556 m (5' 1.25\")   Wt 73 kg (161 lb)   LMP  (LMP Unknown)   SpO2 97%   BMI 30.17 kg/m    Body mass index is 30.17 kg/m .  Physical Exam   GENERAL: alert and no distress  NECK: no adenopathy, no asymmetry, masses, or scars  RESP: lungs clear to auscultation - no rales, rhonchi or wheezes  CV: regular rate and rhythm, normal S1 S2, no S3 or S4, no murmur, click or rub, no peripheral edema  ABDOMEN: soft, nontender, no hepatosplenomegaly, no masses and bowel sounds normal  MS: Aquacel surgical dressing in place over midline incision; expected postoperative swelling in the and in the calf.  No erythema.  Good passive range of motion.            Signed Electronically by: Hansel Dsouza MD    "

## 2025-06-30 ENCOUNTER — HOSPITAL ENCOUNTER (OUTPATIENT)
Dept: CARDIOLOGY | Facility: CLINIC | Age: 75
Discharge: HOME | End: 2025-06-30
Attending: FAMILY MEDICINE | Admitting: FAMILY MEDICINE
Payer: MEDICARE

## 2025-06-30 DIAGNOSIS — R93.89 ABNORMAL FINDING ON CHEST XRAY: ICD-10-CM

## 2025-06-30 DIAGNOSIS — I51.7 CARDIOMEGALY: ICD-10-CM

## 2025-06-30 LAB — LVEF ECHO: NORMAL

## 2025-06-30 PROCEDURE — 93306 TTE W/DOPPLER COMPLETE: CPT | Mod: 26 | Performed by: INTERNAL MEDICINE

## 2025-06-30 PROCEDURE — 93306 TTE W/DOPPLER COMPLETE: CPT

## 2025-08-12 DIAGNOSIS — Z47.89 ENCOUNTER FOR OTHER ORTHOPEDIC AFTERCARE: ICD-10-CM

## 2025-08-13 RX ORDER — HYDROXYZINE PAMOATE 25 MG/1
CAPSULE ORAL
Qty: 60 CAPSULE | Refills: 1 | Status: SHIPPED | OUTPATIENT
Start: 2025-08-13